# Patient Record
Sex: FEMALE | Race: WHITE | NOT HISPANIC OR LATINO | URBAN - METROPOLITAN AREA
[De-identification: names, ages, dates, MRNs, and addresses within clinical notes are randomized per-mention and may not be internally consistent; named-entity substitution may affect disease eponyms.]

---

## 2017-04-12 ENCOUNTER — OUTPATIENT (OUTPATIENT)
Dept: OUTPATIENT SERVICES | Facility: HOSPITAL | Age: 77
LOS: 1 days | End: 2017-04-12
Payer: MEDICARE

## 2017-04-12 VITALS
RESPIRATION RATE: 18 BRPM | SYSTOLIC BLOOD PRESSURE: 120 MMHG | OXYGEN SATURATION: 97 % | WEIGHT: 169.98 LBS | TEMPERATURE: 98 F | HEART RATE: 67 BPM | DIASTOLIC BLOOD PRESSURE: 58 MMHG

## 2017-04-12 DIAGNOSIS — Z90.710 ACQUIRED ABSENCE OF BOTH CERVIX AND UTERUS: Chronic | ICD-10-CM

## 2017-04-12 DIAGNOSIS — Z98.89 OTHER SPECIFIED POSTPROCEDURAL STATES: Chronic | ICD-10-CM

## 2017-04-12 DIAGNOSIS — H26.9 UNSPECIFIED CATARACT: Chronic | ICD-10-CM

## 2017-04-12 DIAGNOSIS — Z01.810 ENCOUNTER FOR PREPROCEDURAL CARDIOVASCULAR EXAMINATION: ICD-10-CM

## 2017-04-12 LAB
ANION GAP SERPL CALC-SCNC: 14 MMOL/L — SIGNIFICANT CHANGE UP (ref 5–17)
ANISOCYTOSIS BLD QL: SLIGHT — SIGNIFICANT CHANGE UP
APTT BLD: 28.6 SEC — SIGNIFICANT CHANGE UP (ref 27.5–37.4)
BASOPHILS # BLD AUTO: 0.1 K/UL — SIGNIFICANT CHANGE UP (ref 0–0.2)
BASOPHILS NFR BLD AUTO: 4 % — HIGH (ref 0–2)
BUN SERPL-MCNC: 23 MG/DL — HIGH (ref 8–20)
CALCIUM SERPL-MCNC: 9.9 MG/DL — SIGNIFICANT CHANGE UP (ref 8.6–10.2)
CHLORIDE SERPL-SCNC: 104 MMOL/L — SIGNIFICANT CHANGE UP (ref 98–107)
CO2 SERPL-SCNC: 22 MMOL/L — SIGNIFICANT CHANGE UP (ref 22–29)
CREAT SERPL-MCNC: 0.94 MG/DL — SIGNIFICANT CHANGE UP (ref 0.5–1.3)
EOSINOPHIL # BLD AUTO: 2.3 K/UL — HIGH (ref 0–0.5)
EOSINOPHIL NFR BLD AUTO: 21 % — HIGH (ref 0–5)
GLUCOSE SERPL-MCNC: 105 MG/DL — SIGNIFICANT CHANGE UP (ref 70–115)
HCT VFR BLD CALC: 35 % — LOW (ref 37–47)
HGB BLD-MCNC: 12 G/DL — SIGNIFICANT CHANGE UP (ref 12–16)
INR BLD: 1.04 RATIO — SIGNIFICANT CHANGE UP (ref 0.88–1.16)
LYMPHOCYTES # BLD AUTO: 19 % — LOW (ref 20–55)
LYMPHOCYTES # BLD AUTO: 2.4 K/UL — SIGNIFICANT CHANGE UP (ref 1–4.8)
MACROCYTES BLD QL: SLIGHT — SIGNIFICANT CHANGE UP
MCHC RBC-ENTMCNC: 30.5 PG — SIGNIFICANT CHANGE UP (ref 27–31)
MCHC RBC-ENTMCNC: 34.3 G/DL — SIGNIFICANT CHANGE UP (ref 32–36)
MCV RBC AUTO: 88.8 FL — SIGNIFICANT CHANGE UP (ref 81–99)
MONOCYTES # BLD AUTO: 0.5 K/UL — SIGNIFICANT CHANGE UP (ref 0–0.8)
MONOCYTES NFR BLD AUTO: 4 % — SIGNIFICANT CHANGE UP (ref 3–10)
NEUTROPHILS # BLD AUTO: 4.7 K/UL — SIGNIFICANT CHANGE UP (ref 1.8–8)
NEUTROPHILS NFR BLD AUTO: 52 % — SIGNIFICANT CHANGE UP (ref 37–73)
PLAT MORPH BLD: NORMAL — SIGNIFICANT CHANGE UP
PLATELET # BLD AUTO: 189 K/UL — SIGNIFICANT CHANGE UP (ref 150–400)
POLYCHROMASIA BLD QL SMEAR: SLIGHT — SIGNIFICANT CHANGE UP
POTASSIUM SERPL-MCNC: 4.6 MMOL/L — SIGNIFICANT CHANGE UP (ref 3.5–5.3)
POTASSIUM SERPL-SCNC: 4.6 MMOL/L — SIGNIFICANT CHANGE UP (ref 3.5–5.3)
PROTHROM AB SERPL-ACNC: 11.5 SEC — SIGNIFICANT CHANGE UP (ref 9.8–12.7)
RBC # BLD: 3.94 M/UL — LOW (ref 4.4–5.2)
RBC # FLD: 13.2 % — SIGNIFICANT CHANGE UP (ref 11–15.6)
RBC BLD AUTO: ABNORMAL
SODIUM SERPL-SCNC: 140 MMOL/L — SIGNIFICANT CHANGE UP (ref 135–145)
WBC # BLD: 10 K/UL — SIGNIFICANT CHANGE UP (ref 4.8–10.8)
WBC # FLD AUTO: 10 K/UL — SIGNIFICANT CHANGE UP (ref 4.8–10.8)

## 2017-04-12 PROCEDURE — 93010 ELECTROCARDIOGRAM REPORT: CPT

## 2017-04-12 PROCEDURE — 85027 COMPLETE CBC AUTOMATED: CPT

## 2017-04-12 PROCEDURE — 85730 THROMBOPLASTIN TIME PARTIAL: CPT

## 2017-04-12 PROCEDURE — G0463: CPT

## 2017-04-12 PROCEDURE — 93005 ELECTROCARDIOGRAM TRACING: CPT

## 2017-04-12 PROCEDURE — 80048 BASIC METABOLIC PNL TOTAL CA: CPT

## 2017-04-12 PROCEDURE — 85610 PROTHROMBIN TIME: CPT

## 2017-04-12 RX ORDER — LOSARTAN POTASSIUM 100 MG/1
1 TABLET, FILM COATED ORAL
Qty: 0 | Refills: 0 | COMMUNITY

## 2017-04-12 NOTE — ASU PATIENT PROFILE, ADULT - PSH
S/P appendectomy    S/P brain surgery    S/P hysterectomy    S/P tonsillectomy Cataract of both eyes    S/P appendectomy    S/P brain surgery    S/P hysterectomy    S/P tonsillectomy

## 2017-04-12 NOTE — H&P PST ADULT - FAMILY HISTORY
No significant family history     Grandparent  Still living? Unknown  Family history of stroke, Age at diagnosis: Age Unknown

## 2017-04-12 NOTE — ASU PATIENT PROFILE, ADULT - PMH
C. difficile colitis    Cerebral aneurysm    Diverticulitis    Hyperlipidemia    Hypertension    Hypertension    Hypothyroid    Hypothyroidism    Seasonal allergies    Stroke    Vertigo

## 2017-04-12 NOTE — H&P PST ADULT - HISTORY OF PRESENT ILLNESS
Patient is a 76 year old female who presents for PST prior to a [facility]. Patient with PMHx HLD, HTN, thyroid disorder, Burton aneurysm, CVA. She has complaints of increased SOB and CISNEROS. worse when climbing stairs and often associated with chest discomfort. Due to underlying risk for CAD, the plan was made to obtain a left heart cath.

## 2017-04-13 ENCOUNTER — OUTPATIENT (OUTPATIENT)
Dept: OUTPATIENT SERVICES | Facility: HOSPITAL | Age: 77
LOS: 1 days | Discharge: HOME | End: 2017-04-13
Payer: MEDICARE

## 2017-04-13 ENCOUNTER — TRANSCRIPTION ENCOUNTER (OUTPATIENT)
Age: 77
End: 2017-04-13

## 2017-04-13 VITALS
DIASTOLIC BLOOD PRESSURE: 68 MMHG | RESPIRATION RATE: 18 BRPM | OXYGEN SATURATION: 94 % | SYSTOLIC BLOOD PRESSURE: 144 MMHG | HEART RATE: 58 BPM

## 2017-04-13 DIAGNOSIS — I25.10 ATHEROSCLEROTIC HEART DISEASE OF NATIVE CORONARY ARTERY WITHOUT ANGINA PECTORIS: ICD-10-CM

## 2017-04-13 DIAGNOSIS — Z01.810 ENCOUNTER FOR PREPROCEDURAL CARDIOVASCULAR EXAMINATION: ICD-10-CM

## 2017-04-13 DIAGNOSIS — Z98.89 OTHER SPECIFIED POSTPROCEDURAL STATES: Chronic | ICD-10-CM

## 2017-04-13 DIAGNOSIS — Z90.710 ACQUIRED ABSENCE OF BOTH CERVIX AND UTERUS: Chronic | ICD-10-CM

## 2017-04-13 DIAGNOSIS — H26.9 UNSPECIFIED CATARACT: Chronic | ICD-10-CM

## 2017-04-13 DIAGNOSIS — R94.39 ABNORMAL RESULT OF OTHER CARDIOVASCULAR FUNCTION STUDY: ICD-10-CM

## 2017-04-13 PROCEDURE — 92928 PRQ TCAT PLMT NTRAC ST 1 LES: CPT | Mod: RC

## 2017-04-13 PROCEDURE — 93458 L HRT ARTERY/VENTRICLE ANGIO: CPT | Mod: 26,XU

## 2017-04-13 PROCEDURE — 93010 ELECTROCARDIOGRAM REPORT: CPT

## 2017-04-13 RX ORDER — ASPIRIN/CALCIUM CARB/MAGNESIUM 324 MG
81 TABLET ORAL ONCE
Qty: 0 | Refills: 0 | Status: COMPLETED | OUTPATIENT
Start: 2017-04-13 | End: 2017-04-13

## 2017-04-13 RX ORDER — METOPROLOL TARTRATE 50 MG
25 TABLET ORAL DAILY
Qty: 0 | Refills: 0 | Status: DISCONTINUED | OUTPATIENT
Start: 2017-04-14 | End: 2017-04-28

## 2017-04-13 RX ORDER — CLOPIDOGREL BISULFATE 75 MG/1
75 TABLET, FILM COATED ORAL DAILY
Qty: 0 | Refills: 0 | Status: DISCONTINUED | OUTPATIENT
Start: 2017-04-14 | End: 2017-04-28

## 2017-04-13 RX ORDER — LORATADINE 10 MG/1
10 TABLET ORAL DAILY
Qty: 0 | Refills: 0 | Status: DISCONTINUED | OUTPATIENT
Start: 2017-04-13 | End: 2017-04-28

## 2017-04-13 RX ORDER — ATORVASTATIN CALCIUM 80 MG/1
10 TABLET, FILM COATED ORAL AT BEDTIME
Qty: 0 | Refills: 0 | Status: DISCONTINUED | OUTPATIENT
Start: 2017-04-13 | End: 2017-04-28

## 2017-04-13 RX ORDER — MONTELUKAST 4 MG/1
10 TABLET, CHEWABLE ORAL DAILY
Qty: 0 | Refills: 0 | Status: DISCONTINUED | OUTPATIENT
Start: 2017-04-13 | End: 2017-04-28

## 2017-04-13 RX ORDER — METOPROLOL TARTRATE 50 MG
25 TABLET ORAL ONCE
Qty: 0 | Refills: 0 | Status: COMPLETED | OUTPATIENT
Start: 2017-04-13 | End: 2017-04-13

## 2017-04-13 RX ORDER — ASPIRIN/CALCIUM CARB/MAGNESIUM 324 MG
81 TABLET ORAL DAILY
Qty: 0 | Refills: 0 | Status: DISCONTINUED | OUTPATIENT
Start: 2017-04-13 | End: 2017-04-28

## 2017-04-13 RX ORDER — LOSARTAN POTASSIUM 100 MG/1
50 TABLET, FILM COATED ORAL DAILY
Qty: 0 | Refills: 0 | Status: DISCONTINUED | OUTPATIENT
Start: 2017-04-13 | End: 2017-04-28

## 2017-04-13 RX ORDER — LEVOTHYROXINE SODIUM 125 MCG
50 TABLET ORAL DAILY
Qty: 0 | Refills: 0 | Status: DISCONTINUED | OUTPATIENT
Start: 2017-04-13 | End: 2017-04-28

## 2017-04-13 RX ADMIN — Medication 81 MILLIGRAM(S): at 08:36

## 2017-04-13 RX ADMIN — Medication 100 MILLIGRAM(S): at 14:15

## 2017-04-13 RX ADMIN — Medication 100 MILLIGRAM(S): at 21:22

## 2017-04-13 RX ADMIN — Medication 25 MILLIGRAM(S): at 11:53

## 2017-04-13 RX ADMIN — Medication 100 MILLIGRAM(S): at 08:43

## 2017-04-13 RX ADMIN — LOSARTAN POTASSIUM 50 MILLIGRAM(S): 100 TABLET, FILM COATED ORAL at 18:12

## 2017-04-13 RX ADMIN — ATORVASTATIN CALCIUM 10 MILLIGRAM(S): 80 TABLET, FILM COATED ORAL at 21:22

## 2017-04-13 NOTE — DISCHARGE NOTE ADULT - CARE PROVIDERS DIRECT ADDRESSES
[email or web address]

## 2017-04-13 NOTE — DISCHARGE NOTE ADULT - PATIENT PORTAL LINK FT
“You can access the FollowHealth Patient Portal, offered by [facility], by registering with the following website: [email or web address]”

## 2017-04-13 NOTE — DISCHARGE NOTE ADULT - MEDICATION SUMMARY - MEDICATIONS TO TAKE
I will START or STAY ON the medications listed below when I get home from the hospital:    Aspirin Enteric Coated 81 mg oral delayed release tablet  -- 2 tab(s) by mouth once a day  -- Indication: For CAD (coronary artery disease)    losartan 50 mg oral tablet  -- 1 tab(s) by mouth once a day  -- Indication: For htn    levocetirizine 5 mg oral tablet  -- 1 tab(s) by mouth once a day (in the evening)  -- Indication: For allergies    atorvastatin 10 mg oral tablet  -- 1 tab(s) by mouth once a day (at bedtime)  -- Indication: For CAD (coronary artery disease)    clopidogrel 75 mg oral tablet  -- 1 tab(s) by mouth once a day  -- Indication: For CAD (coronary artery disease)    Bystolic 5 mg oral tablet  -- 1 tab(s) by mouth once a day  -- Indication: For htn    montelukast 10 mg oral tablet  -- 1 tab(s) by mouth once a day (in the evening)  -- Indication: For allergies    Lovaza ethyl esters 1000 mg oral capsule  -- 2 cap(s) by mouth once a day  -- Indication: For hyperlipidemia    Probiotic Formula (Bacillus Coagulans) oral capsule  -- 1 cap(s) by mouth once a day  -- Indication: For supplement    Synthroid 50 mcg (0.05 mg) oral tablet  -- 1 tab(s) by mouth once a day  -- Indication: For hypothyroid    Centrum Silver  -- 1 tab(s) by mouth once a day  -- Indication: For supplement    Calcium 600+D 600 mg-200 units oral tablet  -- 2 tab(s) by mouth once a day  -- Indication: For supplement    Vitamin B12  -- 1500 microgram(s) by mouth once a day  -- Indication: For supplement

## 2017-04-13 NOTE — DISCHARGE NOTE ADULT - ADDITIONAL INSTRUCTIONS
Restricted use with no heavy lifting of affected arm for 48 hours.  No submerging the arm in water for 48 hours.  You may start showering today.  Call your doctor for any bleeding, swelling, loss of sensation in the hand or fingers, or fingers turning blue.  If heavy bleeding or large lumps form, hold pressure at the spot and come to the Emergency Room.

## 2017-04-13 NOTE — DISCHARGE NOTE ADULT - HOSPITAL COURSE
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  Now s/p PCI with CARLOS X1 pRCA.  Add plavix. 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  Now s/p PCI with CARLOS X1 pRCA.              PROCEDURE RESULTS: S/P PCI with CARLOS X1 to pRCA    ASSESSMENT/PLAN: 	  -Resume home meds  -Initiate plavix (discussed with Dr. Garcia d/t h/o brain aneurysm and coil)  -Follow up with Dr. Salas  Discussed aggressive life style modifications

## 2017-04-13 NOTE — DISCHARGE NOTE ADULT - PLAN OF CARE
optimal cardiac function Restricted use with no heavy lifting of affected arm for 48 hours.  No submerging the arm in water for 48 hours.  You may start showering today.  Call your doctor for any bleeding, swelling, loss of sensation in the hand or fingers, or fingers turning blue.  If heavy bleeding or large lumps form, hold pressure at the spot and come to the Emergency Room.

## 2017-04-13 NOTE — PROGRESS NOTE ADULT - SUBJECTIVE AND OBJECTIVE BOX
Cardiology NP    76 year old female with c/o increased SOB with positive stress test. For cardiac cath to assess coronary arteries.     No change in medical history since PST.

## 2017-04-13 NOTE — PROGRESS NOTE ADULT - SUBJECTIVE AND OBJECTIVE BOX
Nurse Practitioner Progress note:     INTERVAL HISTORY: 76 year old female with c/o increasing SOB and positive stress test    MEDICATIONS:  losartan 50milliGRAM(s) Oral daily  metoprolol 25milliGRAM(s) Oral once  loratadine 10milliGRAM(s) Oral daily  montelukast 10milliGRAM(s) Oral daily  atorvastatin 10milliGRAM(s) Oral at bedtime  levothyroxine 50MICROGram(s) Oral daily  aspirin enteric coated 81milliGRAM(s) Oral daily      TELEMETRY: NSR    HR: 58 (58 - 58)  BP: 144/68 (144/68 - 144/68)  RR: 18 (18 - 18)  SpO2: 94% (94% - 94%)      PHYSICAL EXAM:  Appearance: Normal	  Cardiovascular: Normal S1 S2, No JVD, No murmurs, No edema  Respiratory: Lungs clear to auscultation	  Neurologic: Non-focal, A&O X3.  No neuro deficits  Procedure Site: Right radial band in place.  Site benign.  No bleeding/hematoma/ecchymosis. + palp radial pulse    12 lead EKG:  	SB 1st degree HB      PROCEDURE RESULTS: S/P PCI with CARLOS X1 to pRCA    ASSESSMENT/PLAN: 	  -Radial precautions  -D/C radial band in 1 hour  -Resume home meds  -Initiate plavix (discussed with Dr. Garcia d/t h/o brain aneurysm and coil)  -Follow up with Dr. Salas  -Check labs/EKG/site check in AM  -Probable discharge in AM

## 2017-04-13 NOTE — PATIENT PROFILE ADULT. - PSH
Cataract of both eyes    S/P appendectomy    S/P brain surgery    S/P hysterectomy    S/P tonsillectomy

## 2017-04-13 NOTE — DISCHARGE NOTE ADULT - CARE PROVIDER_API CALL
Marcus Jane), Cardiovascular Disease; Internal Medicine  [address] [place] [address]  Phone: [phone]  Fax: [phone]

## 2017-04-13 NOTE — DISCHARGE NOTE ADULT - CARE PLAN
Principal Discharge DX:	CAD (coronary artery disease)  Goal:	optimal cardiac function  Instructions for follow-up, activity and diet:	Restricted use with no heavy lifting of affected arm for 48 hours.  No submerging the arm in water for 48 hours.  You may start showering today.  Call your doctor for any bleeding, swelling, loss of sensation in the hand or fingers, or fingers turning blue.  If heavy bleeding or large lumps form, hold pressure at the spot and come to the Emergency Room.

## 2017-04-14 VITALS
TEMPERATURE: 98 F | RESPIRATION RATE: 16 BRPM | HEART RATE: 74 BPM | SYSTOLIC BLOOD PRESSURE: 134 MMHG | OXYGEN SATURATION: 96 % | DIASTOLIC BLOOD PRESSURE: 58 MMHG

## 2017-04-14 LAB
ANION GAP SERPL CALC-SCNC: 16 MMOL/L — SIGNIFICANT CHANGE UP (ref 5–17)
BUN SERPL-MCNC: 23 MG/DL — HIGH (ref 8–20)
CALCIUM SERPL-MCNC: 9.3 MG/DL — SIGNIFICANT CHANGE UP (ref 8.6–10.2)
CHLORIDE SERPL-SCNC: 103 MMOL/L — SIGNIFICANT CHANGE UP (ref 98–107)
CO2 SERPL-SCNC: 22 MMOL/L — SIGNIFICANT CHANGE UP (ref 22–29)
CREAT SERPL-MCNC: 1.03 MG/DL — SIGNIFICANT CHANGE UP (ref 0.5–1.3)
GLUCOSE SERPL-MCNC: 131 MG/DL — HIGH (ref 70–115)
HCT VFR BLD CALC: 33.9 % — LOW (ref 37–47)
HGB BLD-MCNC: 12.2 G/DL — SIGNIFICANT CHANGE UP (ref 12–16)
INR BLD: 1.07 RATIO — SIGNIFICANT CHANGE UP (ref 0.88–1.16)
MCHC RBC-ENTMCNC: 31.9 PG — HIGH (ref 27–31)
MCHC RBC-ENTMCNC: 36 G/DL — SIGNIFICANT CHANGE UP (ref 32–36)
MCV RBC AUTO: 88.7 FL — SIGNIFICANT CHANGE UP (ref 81–99)
PLATELET # BLD AUTO: 181 K/UL — SIGNIFICANT CHANGE UP (ref 150–400)
POTASSIUM SERPL-MCNC: 4.1 MMOL/L — SIGNIFICANT CHANGE UP (ref 3.5–5.3)
POTASSIUM SERPL-SCNC: 4.1 MMOL/L — SIGNIFICANT CHANGE UP (ref 3.5–5.3)
PROTHROM AB SERPL-ACNC: 11.8 SEC — SIGNIFICANT CHANGE UP (ref 9.8–12.7)
RBC # BLD: 3.82 M/UL — LOW (ref 4.4–5.2)
RBC # FLD: 13 % — SIGNIFICANT CHANGE UP (ref 11–15.6)
SODIUM SERPL-SCNC: 141 MMOL/L — SIGNIFICANT CHANGE UP (ref 135–145)
WBC # BLD: 10.4 K/UL — SIGNIFICANT CHANGE UP (ref 4.8–10.8)
WBC # FLD AUTO: 10.4 K/UL — SIGNIFICANT CHANGE UP (ref 4.8–10.8)

## 2017-04-14 PROCEDURE — 85027 COMPLETE CBC AUTOMATED: CPT

## 2017-04-14 PROCEDURE — 85610 PROTHROMBIN TIME: CPT

## 2017-04-14 PROCEDURE — C9600: CPT | Mod: RC

## 2017-04-14 PROCEDURE — C1894: CPT

## 2017-04-14 PROCEDURE — 93005 ELECTROCARDIOGRAM TRACING: CPT

## 2017-04-14 PROCEDURE — C1769: CPT

## 2017-04-14 PROCEDURE — 93010 ELECTROCARDIOGRAM REPORT: CPT

## 2017-04-14 PROCEDURE — C1887: CPT

## 2017-04-14 PROCEDURE — C1725: CPT

## 2017-04-14 PROCEDURE — 93458 L HRT ARTERY/VENTRICLE ANGIO: CPT

## 2017-04-14 PROCEDURE — C1874: CPT

## 2017-04-14 PROCEDURE — 80048 BASIC METABOLIC PNL TOTAL CA: CPT

## 2017-04-14 RX ORDER — CLOPIDOGREL BISULFATE 75 MG/1
1 TABLET, FILM COATED ORAL
Qty: 90 | Refills: 0
Start: 2017-04-14

## 2017-04-14 RX ORDER — CLOPIDOGREL BISULFATE 75 MG/1
1 TABLET, FILM COATED ORAL
Qty: 90 | Refills: 0 | OUTPATIENT
Start: 2017-04-14

## 2017-04-14 RX ADMIN — Medication 100 MILLIGRAM(S): at 06:26

## 2017-04-14 RX ADMIN — MONTELUKAST 10 MILLIGRAM(S): 4 TABLET, CHEWABLE ORAL at 11:10

## 2017-04-14 RX ADMIN — LOSARTAN POTASSIUM 50 MILLIGRAM(S): 100 TABLET, FILM COATED ORAL at 06:25

## 2017-04-14 RX ADMIN — Medication 25 MILLIGRAM(S): at 06:25

## 2017-04-14 RX ADMIN — Medication 50 MICROGRAM(S): at 06:25

## 2017-04-14 RX ADMIN — CLOPIDOGREL BISULFATE 75 MILLIGRAM(S): 75 TABLET, FILM COATED ORAL at 11:10

## 2017-04-14 RX ADMIN — Medication 81 MILLIGRAM(S): at 11:10

## 2017-04-14 RX ADMIN — LORATADINE 10 MILLIGRAM(S): 10 TABLET ORAL at 11:10

## 2017-04-14 NOTE — PROGRESS NOTE ADULT - SUBJECTIVE AND OBJECTIVE BOX
[facility]  [facility] and Fatigue    History of Present Illness:  		  Patient is a 76 year old female who presents for PST prior to a LHC. Patient with PMHx HLD, HTN, thyroid disorder, Burton aneurysm, CVA. She has complaints of increased SOB and CISNEROS. worse when climbing stairs and often associated with chest discomfort. Due to underlying risk for CAD.  Underwent LHC yesterday, official report pending.         Pre-operative cardiovascular examination  Other nonspecific abnormal cardiovascular system function study  No h/o HF  Family history of stroke (Grandparent)  No significant family history  Handoff  C. difficile colitis  Vertigo  Hypothyroidism  Hyperlipidemia  Hypertension  Cerebral aneurysm  Diverticulitis  Hypertension  Stroke  Seasonal allergies  Hypothyroid  CAD (coronary artery disease)  Cataract of both eyes  S/P brain surgery  S/P hysterectomy  S/P appendectomy  S/P tonsillectomy      REVIEW OF SYSTEMS    General: Denies fever, chills, pain, no discomfort  	  Respiratory and Thorax:  Denies cough, sob, or any discomfort  	  Cardiovascular:  Denies chest pain, palpitations or any discomfort	    Gastrointestinal:  Denies n/v/d, constipation, or any discomfort    Genitourinary:  Denies frequency, burning, or pain    Musculoskeletal:  Denies joint pain, swelling, or any discomfort     Neurological:  Denies headache, dizziness blurred vision, numbing or tingling    Hematology  Kameron bleeding o swelling    Allergic/Immunologic:	  MEDICATIONS:  losartan 50milliGRAM(s) Oral daily  metoprolol 25milliGRAM(s) Oral daily  loratadine 10milliGRAM(s) Oral daily  montelukast 10milliGRAM(s) Oral daily  guaiFENesin    Syrup 100milliGRAM(s) Oral every 6 hours PRN  atorvastatin 10milliGRAM(s) Oral at bedtime  levothyroxine 50MICROGram(s) Oral daily  clopidogrel Tablet 75milliGRAM(s) Oral daily  aspirin enteric coated 81milliGRAM(s) Oral daily        PHYSICAL EXAM:    T(C): 36.9, Max: 37.1 (04-13 @ 18:10)  HR: 63 (52 - 64)  BP: 130/50 (111/56 - 161/73)  RR: 16 (14 - 16)  SpO2: 96% (94% - 97%)    Daily Weight in k.3 (2017 05:24)    Appearance: Normal	  HEENT:   Normal oral mucosa, PERRL, EOMI	  Lymphatic: No lymphadenopathy  Cardiovascular: Normal S1 S2, No JVD, No murmurs, No edema  Respiratory: Lungs clear to auscultation	  Psychiatry: A & O x 3, Mood & affect appropriate  Gastrointestinal:  Soft, Non-tender, + BS	  Skin: No rashes, No ecchymoses, No cyanosis  Neurologic: Non-focal  Extremities: Normal range of motion, No clubbing, cyanosis or edema  Vascular: Peripheral pulses palpable 2+ bilaterally  Procedure Site:  Right radial site, no bleeding, no pain, slight bruising noted, no hematoma, +PP no edema.      TELEMETRY: 	 Sr 1 degree AV block, no ecotpy noted.     ECG:  	SR, 1 degree, R BBB noted             12.2   10.4  )-----------( 181      ( 2017 05:26 )             33.9     04-14    141  |  103  |  23.0<H>  ----------------------------<  131<H>  4.1   |  22.0  |  1.03    Ca    9.3      2017 05:26        PROCEDURE RESULTS: S/P PCI with CARLOS X1 to pRCA    ASSESSMENT/PLAN: 	  -Resume home meds  -Initiate plavix (discussed with Dr. Garcia d/t h/o brain aneurysm and coil)  -Follow up with Dr. Salas  Discussed aggressive life style modifications

## 2017-12-22 ENCOUNTER — RX RENEWAL (OUTPATIENT)
Age: 77
End: 2017-12-22

## 2018-01-08 ENCOUNTER — RECORD ABSTRACTING (OUTPATIENT)
Age: 78
End: 2018-01-08

## 2018-01-08 DIAGNOSIS — Z86.73 PERSONAL HISTORY OF TRANSIENT ISCHEMIC ATTACK (TIA), AND CEREBRAL INFARCTION W/OUT RESIDUAL DEFICITS: ICD-10-CM

## 2018-01-09 ENCOUNTER — RECORD ABSTRACTING (OUTPATIENT)
Age: 78
End: 2018-01-09

## 2018-02-05 ENCOUNTER — APPOINTMENT (OUTPATIENT)
Dept: CARDIOLOGY | Facility: CLINIC | Age: 78
End: 2018-02-05
Payer: MEDICARE

## 2018-02-05 VITALS
DIASTOLIC BLOOD PRESSURE: 60 MMHG | HEIGHT: 66 IN | HEART RATE: 64 BPM | SYSTOLIC BLOOD PRESSURE: 139 MMHG | BODY MASS INDEX: 27.64 KG/M2 | WEIGHT: 172 LBS | RESPIRATION RATE: 14 BRPM

## 2018-02-05 PROCEDURE — 93000 ELECTROCARDIOGRAM COMPLETE: CPT

## 2018-02-05 PROCEDURE — 99214 OFFICE O/P EST MOD 30 MIN: CPT

## 2018-02-05 RX ORDER — ATORVASTATIN CALCIUM 10 MG/1
10 TABLET, FILM COATED ORAL
Refills: 0 | Status: DISCONTINUED | COMMUNITY
End: 2018-02-05

## 2018-06-01 ENCOUNTER — APPOINTMENT (OUTPATIENT)
Dept: CARDIOLOGY | Facility: CLINIC | Age: 78
End: 2018-06-01
Payer: MEDICARE

## 2018-06-01 VITALS
BODY MASS INDEX: 27.97 KG/M2 | WEIGHT: 174 LBS | SYSTOLIC BLOOD PRESSURE: 110 MMHG | DIASTOLIC BLOOD PRESSURE: 58 MMHG | RESPIRATION RATE: 14 BRPM | HEIGHT: 66 IN | HEART RATE: 72 BPM

## 2018-06-01 DIAGNOSIS — E07.9 DISORDER OF THYROID, UNSPECIFIED: ICD-10-CM

## 2018-06-01 PROCEDURE — 93000 ELECTROCARDIOGRAM COMPLETE: CPT

## 2018-06-01 PROCEDURE — 99214 OFFICE O/P EST MOD 30 MIN: CPT

## 2018-09-11 ENCOUNTER — APPOINTMENT (OUTPATIENT)
Dept: CARDIOLOGY | Facility: CLINIC | Age: 78
End: 2018-09-11

## 2018-10-05 ENCOUNTER — MOBILE ON CALL (OUTPATIENT)
Age: 78
End: 2018-10-05

## 2018-11-07 ENCOUNTER — APPOINTMENT (OUTPATIENT)
Dept: GASTROENTEROLOGY | Facility: CLINIC | Age: 78
End: 2018-11-07
Payer: MEDICARE

## 2018-11-07 VITALS
BODY MASS INDEX: 28.12 KG/M2 | SYSTOLIC BLOOD PRESSURE: 169 MMHG | DIASTOLIC BLOOD PRESSURE: 81 MMHG | WEIGHT: 175 LBS | HEIGHT: 66 IN | HEART RATE: 68 BPM

## 2018-11-07 DIAGNOSIS — R93.89 ABNORMAL FINDINGS ON DIAGNOSTIC IMAGING OF OTHER SPECIFIED BODY STRUCTURES: ICD-10-CM

## 2018-11-07 PROCEDURE — 99204 OFFICE O/P NEW MOD 45 MIN: CPT

## 2018-11-26 ENCOUNTER — APPOINTMENT (OUTPATIENT)
Dept: CARDIOLOGY | Facility: CLINIC | Age: 78
End: 2018-11-26
Payer: MEDICARE

## 2018-11-26 VITALS
SYSTOLIC BLOOD PRESSURE: 138 MMHG | HEIGHT: 66 IN | HEART RATE: 70 BPM | BODY MASS INDEX: 28.28 KG/M2 | RESPIRATION RATE: 14 BRPM | WEIGHT: 176 LBS | DIASTOLIC BLOOD PRESSURE: 60 MMHG

## 2018-11-26 DIAGNOSIS — R53.82 CHRONIC FATIGUE, UNSPECIFIED: ICD-10-CM

## 2018-11-26 PROCEDURE — 99214 OFFICE O/P EST MOD 30 MIN: CPT

## 2018-11-26 PROCEDURE — 93000 ELECTROCARDIOGRAM COMPLETE: CPT

## 2018-11-26 RX ORDER — LEVOTHYROXINE SODIUM 75 UG/1
75 TABLET ORAL
Refills: 0 | Status: DISCONTINUED | COMMUNITY
End: 2018-11-26

## 2018-11-26 NOTE — REVIEW OF SYSTEMS
[Feeling Fatigued] : feeling fatigued [see HPI] : see HPI [Depression] : depression [Negative] : Heme/Lymph

## 2018-11-27 NOTE — PHYSICAL EXAM
[General Appearance - Well Developed] : well developed [General Appearance - Well Nourished] : well nourished [General Appearance - In No Acute Distress] : no acute distress [Normal Conjunctiva] : the conjunctiva exhibited no abnormalities [Normal Oral Mucosa] : normal oral mucosa [Auscultation Breath Sounds / Voice Sounds] : lungs were clear to auscultation bilaterally [Heart Rate And Rhythm] : heart rate and rhythm were normal [Heart Sounds] : normal S1 and S2 [Murmurs] : no murmurs present [Systolic grade ___/6] : A grade [unfilled]/6 systolic murmur was heard. [Bowel Sounds] : normal bowel sounds [Abnormal Walk] : normal gait [Skin Color & Pigmentation] : normal skin color and pigmentation [Skin Turgor] : normal skin turgor [Oriented To Time, Place, And Person] : oriented to person, place, and time [Affect] : the affect was normal [Mood] : the mood was normal [FreeTextEntry1] : No edema

## 2018-11-27 NOTE — HISTORY OF PRESENT ILLNESS
[FreeTextEntry1] : From a cardiac standpoint, Mrs. Walker denies exertional chest pain, shortness of breath, palpitations, lightheadedness, or syncope.  She continues to complain of fatigue which may well be secondary to chronic insomnia.  She states she is need of undergoing endoscopy because of recently documented pancreatic cysts on an abdominal MRI.

## 2018-11-27 NOTE — REASON FOR VISIT
[FreeTextEntry1] : Patient presents today for the evaluation and management of hypertension, hyperlipidemia, and coronary artery disease for which she is status-post RCA stenting in April 2017. \par \par  \par   \par

## 2018-11-27 NOTE — ASSESSMENT
[FreeTextEntry1] : 1.  EKG today reveals sinus rhythm at 70 bpm.  Incomplete right bundle branch block.  No acute ischemic changes.  \par 2.  Hypertension:  Blood pressure well controlled at this time on current medications. \par 3.  Hyperlipidemia:  Patient advised on a low-fat / low-cholesterol diet.  Continuation of atorvastatin therapy.  Repeat bloodwork in 3-6 months. \par 4.  Coronary artery disease status-post RCA stenting:  Patient remains asymptomatic and reasonable active.  \par 5.  Pancreatic cysts:  Patient in need of upper endoscopy and possibly endoscopic ultrasound with possible fine needle aspiration of her cysts.  From a cardiac standpoint, there are no significant contraindications.  Patient advised to hold aspirin, Plavix, omega III fish oil, and multivitamins for seven days prior to her procedure.   \par

## 2018-12-20 ENCOUNTER — TRANSCRIPTION ENCOUNTER (OUTPATIENT)
Age: 78
End: 2018-12-20

## 2018-12-21 ENCOUNTER — RESULT REVIEW (OUTPATIENT)
Age: 78
End: 2018-12-21

## 2018-12-21 ENCOUNTER — EMERGENCY (EMERGENCY)
Facility: HOSPITAL | Age: 78
LOS: 1 days | Discharge: HOME | End: 2018-12-21
Attending: EMERGENCY MEDICINE
Payer: MEDICARE

## 2018-12-21 ENCOUNTER — APPOINTMENT (OUTPATIENT)
Dept: GASTROENTEROLOGY | Facility: HOSPITAL | Age: 78
End: 2018-12-21

## 2018-12-21 ENCOUNTER — OUTPATIENT (OUTPATIENT)
Dept: OUTPATIENT SERVICES | Facility: HOSPITAL | Age: 78
LOS: 1 days | End: 2018-12-21
Payer: MEDICARE

## 2018-12-21 VITALS
OXYGEN SATURATION: 98 % | SYSTOLIC BLOOD PRESSURE: 160 MMHG | RESPIRATION RATE: 20 BRPM | HEART RATE: 67 BPM | DIASTOLIC BLOOD PRESSURE: 70 MMHG

## 2018-12-21 VITALS — HEIGHT: 67 IN | WEIGHT: 175.05 LBS

## 2018-12-21 DIAGNOSIS — Z98.89 OTHER SPECIFIED POSTPROCEDURAL STATES: Chronic | ICD-10-CM

## 2018-12-21 DIAGNOSIS — K86.2 CYST OF PANCREAS: ICD-10-CM

## 2018-12-21 DIAGNOSIS — Z90.710 ACQUIRED ABSENCE OF BOTH CERVIX AND UTERUS: Chronic | ICD-10-CM

## 2018-12-21 DIAGNOSIS — H26.9 UNSPECIFIED CATARACT: Chronic | ICD-10-CM

## 2018-12-21 LAB
ALBUMIN SERPL ELPH-MCNC: 4.2 G/DL — SIGNIFICANT CHANGE UP (ref 3.3–5.2)
ALP SERPL-CCNC: 67 U/L — SIGNIFICANT CHANGE UP (ref 40–120)
ALT FLD-CCNC: 22 U/L — SIGNIFICANT CHANGE UP
ANION GAP SERPL CALC-SCNC: 14 MMOL/L — SIGNIFICANT CHANGE UP (ref 5–17)
AST SERPL-CCNC: 19 U/L — SIGNIFICANT CHANGE UP
BASOPHILS # BLD AUTO: 0 K/UL — SIGNIFICANT CHANGE UP (ref 0–0.2)
BASOPHILS NFR BLD AUTO: 0.3 % — SIGNIFICANT CHANGE UP (ref 0–2)
BILIRUB SERPL-MCNC: 0.4 MG/DL — SIGNIFICANT CHANGE UP (ref 0.4–2)
BUN SERPL-MCNC: 23 MG/DL — HIGH (ref 8–20)
CALCIUM SERPL-MCNC: 8.4 MG/DL — LOW (ref 8.6–10.2)
CHLORIDE SERPL-SCNC: 104 MMOL/L — SIGNIFICANT CHANGE UP (ref 98–107)
CO2 SERPL-SCNC: 21 MMOL/L — LOW (ref 22–29)
CREAT SERPL-MCNC: 0.95 MG/DL — SIGNIFICANT CHANGE UP (ref 0.5–1.3)
EOSINOPHIL # BLD AUTO: 0 K/UL — SIGNIFICANT CHANGE UP (ref 0–0.5)
EOSINOPHIL NFR BLD AUTO: 0.5 % — SIGNIFICANT CHANGE UP (ref 0–6)
GLUCOSE SERPL-MCNC: 194 MG/DL — HIGH (ref 70–115)
HCT VFR BLD CALC: 34.5 % — LOW (ref 37–47)
HGB BLD-MCNC: 11.4 G/DL — LOW (ref 12–16)
LYMPHOCYTES # BLD AUTO: 1 K/UL — SIGNIFICANT CHANGE UP (ref 1–4.8)
LYMPHOCYTES # BLD AUTO: 15.6 % — LOW (ref 20–55)
MCHC RBC-ENTMCNC: 29.9 PG — SIGNIFICANT CHANGE UP (ref 27–31)
MCHC RBC-ENTMCNC: 33 G/DL — SIGNIFICANT CHANGE UP (ref 32–36)
MCV RBC AUTO: 90.6 FL — SIGNIFICANT CHANGE UP (ref 81–99)
MONOCYTES # BLD AUTO: 0.1 K/UL — SIGNIFICANT CHANGE UP (ref 0–0.8)
MONOCYTES NFR BLD AUTO: 1.5 % — LOW (ref 3–10)
NEUTROPHILS # BLD AUTO: 5.4 K/UL — SIGNIFICANT CHANGE UP (ref 1.8–8)
NEUTROPHILS NFR BLD AUTO: 81.6 % — HIGH (ref 37–73)
PLATELET # BLD AUTO: 168 K/UL — SIGNIFICANT CHANGE UP (ref 150–400)
POTASSIUM SERPL-MCNC: 4.6 MMOL/L — SIGNIFICANT CHANGE UP (ref 3.5–5.3)
POTASSIUM SERPL-SCNC: 4.6 MMOL/L — SIGNIFICANT CHANGE UP (ref 3.5–5.3)
PROT SERPL-MCNC: 6.4 G/DL — LOW (ref 6.6–8.7)
RBC # BLD: 3.81 M/UL — LOW (ref 4.4–5.2)
RBC # FLD: 12.8 % — SIGNIFICANT CHANGE UP (ref 11–15.6)
SODIUM SERPL-SCNC: 139 MMOL/L — SIGNIFICANT CHANGE UP (ref 135–145)
WBC # BLD: 6.6 K/UL — SIGNIFICANT CHANGE UP (ref 4.8–10.8)
WBC # FLD AUTO: 6.6 K/UL — SIGNIFICANT CHANGE UP (ref 4.8–10.8)

## 2018-12-21 PROCEDURE — 88173 CYTOPATH EVAL FNA REPORT: CPT | Mod: 26

## 2018-12-21 PROCEDURE — 85027 COMPLETE CBC AUTOMATED: CPT

## 2018-12-21 PROCEDURE — 96374 THER/PROPH/DIAG INJ IV PUSH: CPT

## 2018-12-21 PROCEDURE — 88305 TISSUE EXAM BY PATHOLOGIST: CPT | Mod: 26

## 2018-12-21 PROCEDURE — 80053 COMPREHEN METABOLIC PANEL: CPT

## 2018-12-21 PROCEDURE — 43242 EGD US FINE NEEDLE BX/ASPIR: CPT

## 2018-12-21 PROCEDURE — 82962 GLUCOSE BLOOD TEST: CPT

## 2018-12-21 PROCEDURE — 74019 RADEX ABDOMEN 2 VIEWS: CPT

## 2018-12-21 PROCEDURE — 88173 CYTOPATH EVAL FNA REPORT: CPT

## 2018-12-21 PROCEDURE — 36415 COLL VENOUS BLD VENIPUNCTURE: CPT

## 2018-12-21 PROCEDURE — 88305 TISSUE EXAM BY PATHOLOGIST: CPT

## 2018-12-21 PROCEDURE — 71046 X-RAY EXAM CHEST 2 VIEWS: CPT

## 2018-12-21 PROCEDURE — 99284 EMERGENCY DEPT VISIT MOD MDM: CPT | Mod: 25

## 2018-12-21 PROCEDURE — 43238 EGD US FINE NEEDLE BX/ASPIR: CPT

## 2018-12-21 PROCEDURE — 88342 IMHCHEM/IMCYTCHM 1ST ANTB: CPT | Mod: 26

## 2018-12-21 PROCEDURE — 71046 X-RAY EXAM CHEST 2 VIEWS: CPT | Mod: 26

## 2018-12-21 PROCEDURE — 43239 EGD BIOPSY SINGLE/MULTIPLE: CPT | Mod: 59

## 2018-12-21 PROCEDURE — 99284 EMERGENCY DEPT VISIT MOD MDM: CPT

## 2018-12-21 PROCEDURE — 43239 EGD BIOPSY SINGLE/MULTIPLE: CPT | Mod: XU

## 2018-12-21 PROCEDURE — 74019 RADEX ABDOMEN 2 VIEWS: CPT | Mod: 26

## 2018-12-21 PROCEDURE — 88342 IMHCHEM/IMCYTCHM 1ST ANTB: CPT

## 2018-12-21 RX ORDER — ONDANSETRON 8 MG/1
4 TABLET, FILM COATED ORAL ONCE
Qty: 0 | Refills: 0 | Status: COMPLETED | OUTPATIENT
Start: 2018-12-21 | End: 2018-12-21

## 2018-12-21 RX ADMIN — ONDANSETRON 4 MILLIGRAM(S): 8 TABLET, FILM COATED ORAL at 12:40

## 2018-12-21 NOTE — ED ADULT NURSE NOTE - OBJECTIVE STATEMENT
from our endoscopy suite as out patient, Had pancreatic cyst removal via endoscopy, became nausea and HTN. They have IV in, no labs.

## 2018-12-21 NOTE — ED ADULT NURSE NOTE - CHIEF COMPLAINT QUOTE
Patient arrived to ED today from endoscopy with c/o nausea, hypertension.  Patient was having procedure for fluid removal form pancreatic cyst, had procedure, was then having nausea and was hypertensive.  Patient has had symptoms for about 3 hours.

## 2018-12-21 NOTE — ED ADULT TRIAGE NOTE - CHIEF COMPLAINT QUOTE
Patient arrived to ED today from endoscopy with c/o nausea, hypertension.  Patient was having procedure for fluid removal form pancreatic cyst. Patient arrived to ED today from endoscopy with c/o nausea, hypertension.  Patient was having procedure for fluid removal form pancreatic cyst, had procedure, was then having nausea and was hypertensive.  Patient has had symptoms for about 3 hours.

## 2018-12-21 NOTE — ED PROVIDER NOTE - OBJECTIVE STATEMENT
78 year old female with a h/o pancreatic cyst presents with c/o nausea and vomiting after getting an endoscopy

## 2018-12-27 LAB — NON-GYNECOLOGICAL CYTOLOGY STUDY: SIGNIFICANT CHANGE UP

## 2018-12-28 LAB — SURGICAL PATHOLOGY STUDY: SIGNIFICANT CHANGE UP

## 2019-01-02 ENCOUNTER — OTHER (OUTPATIENT)
Age: 79
End: 2019-01-02

## 2019-01-10 ENCOUNTER — APPOINTMENT (OUTPATIENT)
Dept: CARDIOLOGY | Facility: CLINIC | Age: 79
End: 2019-01-10
Payer: MEDICARE

## 2019-01-10 VITALS
DIASTOLIC BLOOD PRESSURE: 60 MMHG | WEIGHT: 171 LBS | BODY MASS INDEX: 27.48 KG/M2 | HEART RATE: 67 BPM | HEIGHT: 66 IN | RESPIRATION RATE: 14 BRPM | SYSTOLIC BLOOD PRESSURE: 136 MMHG

## 2019-01-10 DIAGNOSIS — K86.2 CYST OF PANCREAS: ICD-10-CM

## 2019-01-10 DIAGNOSIS — R93.89 ABNORMAL FINDINGS ON DIAGNOSTIC IMAGING OF OTHER SPECIFIED BODY STRUCTURES: ICD-10-CM

## 2019-01-10 PROCEDURE — 99214 OFFICE O/P EST MOD 30 MIN: CPT

## 2019-01-10 PROCEDURE — 93000 ELECTROCARDIOGRAM COMPLETE: CPT

## 2019-01-10 RX ORDER — LEVOFLOXACIN 500 MG/1
500 TABLET, FILM COATED ORAL DAILY
Qty: 3 | Refills: 0 | Status: DISCONTINUED | COMMUNITY
Start: 2018-12-21 | End: 2019-01-10

## 2019-01-10 NOTE — PHYSICAL EXAM
[General Appearance - Well Developed] : well developed [General Appearance - In No Acute Distress] : no acute distress [Normal Conjunctiva] : the conjunctiva exhibited no abnormalities [Normal Oral Mucosa] : normal oral mucosa [] : no respiratory distress [Auscultation Breath Sounds / Voice Sounds] : lungs were clear to auscultation bilaterally [Heart Rate And Rhythm] : heart rate and rhythm were normal [Heart Sounds] : normal S1 and S2 [Edema] : no peripheral edema present [Bowel Sounds] : normal bowel sounds [Abnormal Walk] : normal gait [Skin Color & Pigmentation] : normal skin color and pigmentation [Skin Turgor] : normal skin turgor [Oriented To Time, Place, And Person] : oriented to person, place, and time [Affect] : the affect was normal [Mood] : the mood was normal [FreeTextEntry1] : Without edema

## 2019-01-10 NOTE — HISTORY OF PRESENT ILLNESS
[FreeTextEntry1] : Mrs. Walker presents today for follow up status post a recent [facility] admission for a pancreatic transgastric FNA which revealed no infection.  During her hospital stay, she had a CXR which revealed cardiomegaly.  Patient presently without complaints of chest pain, shortness of breath, palpitations, lightheadedness or syncope.

## 2019-01-10 NOTE — DISCUSSION/SUMMARY
[FreeTextEntry1] : Discussed case and plan with Dr. Jane\par \par 1 - Pancreatic cysts:  status post FNA biopsy.  Negative for infection.  During hospital stay, CXR revealed cardiomegaly.  Will schedule patient for echocardiogram.\par \par 2 - Hypertension:  blood pressure well controlled on current medications.  WIll continue to follow a strict low salt diet.  Called patient's [facility] pharmacy, her losartan not affected by the recall.\par \par 3 - CAD s/p RCA stent:  stable at this time.  NO chest pain, shortness of breath, palpitations, lightheadedness or syncope.\par \par 4 - Recent labs:  H/H 11.4/34.5, platelets 168, potassium 4.6, BUN 23, creatinine 0.95, glucose 194, cholesterol 165, triglycerides 159, HDL 39, LDL 94, TC/HDL 4.2, HgA1c 6.5, TSH 4.88.  Copy of labs given to patient for PMDs review of HgA1c and elevated TSH.\par \par 5 - Follow up after echocardiogram is completed.

## 2019-01-10 NOTE — REASON FOR VISIT
[FreeTextEntry1] : The patient is a 78 y.o. white female who presents today for evaluation and management of hypertension, hyperlipidemia, and coronary artery disease status post RCA stenting in April of 2017.

## 2019-02-06 ENCOUNTER — APPOINTMENT (OUTPATIENT)
Dept: CARDIOLOGY | Facility: CLINIC | Age: 79
End: 2019-02-06
Payer: MEDICARE

## 2019-02-06 PROCEDURE — 93308 TTE F-UP OR LMTD: CPT

## 2019-02-26 ENCOUNTER — APPOINTMENT (OUTPATIENT)
Dept: CARDIOLOGY | Facility: CLINIC | Age: 79
End: 2019-02-26

## 2019-02-28 ENCOUNTER — APPOINTMENT (OUTPATIENT)
Dept: CARDIOLOGY | Facility: CLINIC | Age: 79
End: 2019-02-28
Payer: MEDICARE

## 2019-02-28 ENCOUNTER — NON-APPOINTMENT (OUTPATIENT)
Age: 79
End: 2019-02-28

## 2019-02-28 VITALS
SYSTOLIC BLOOD PRESSURE: 120 MMHG | WEIGHT: 168 LBS | DIASTOLIC BLOOD PRESSURE: 60 MMHG | RESPIRATION RATE: 14 BRPM | BODY MASS INDEX: 27 KG/M2 | HEART RATE: 63 BPM | HEIGHT: 66 IN

## 2019-02-28 PROCEDURE — 99213 OFFICE O/P EST LOW 20 MIN: CPT

## 2019-02-28 PROCEDURE — 93000 ELECTROCARDIOGRAM COMPLETE: CPT

## 2019-02-28 RX ORDER — ASPIRIN 81 MG
81 TABLET, DELAYED RELEASE (ENTERIC COATED) ORAL
Refills: 0 | Status: ACTIVE | COMMUNITY

## 2019-02-28 RX ORDER — BUPROPION HYDROCHLORIDE 75 MG/1
75 TABLET, FILM COATED ORAL DAILY
Refills: 0 | Status: DISCONTINUED | COMMUNITY
End: 2019-02-28

## 2019-03-01 NOTE — ASSESSMENT
[FreeTextEntry1] : 1.  EKG today reveals normal sinus rhythm at 62 bpm.  1o AV block.  Incomplete right bundle branch block.  No acute ischemic changes. \par 2.  Echocardiography:  Patient recently underwent echocardiography which revealed normal left ventricular chamber dimensions and wall motion with preserved ejection reaction estimated between 70 and 75%.      There are no significant valvular abnormalities, intracardiac masses, or pericardial effusions noted.  Patient is advised on regular aerobic exercise as well as a low-fat diet.  Fasting bloodwork and an office visit in six months is advised. \par

## 2019-03-01 NOTE — HISTORY OF PRESENT ILLNESS
[FreeTextEntry1] : Patient presents today without complaints of exertional chest pain, shortness of breath, palpitations, lightheadedness, or syncope.  She states she remains reasonably active and has lost some weight.

## 2019-03-01 NOTE — REASON FOR VISIT
[FreeTextEntry1] : Mrs. Walker presents today for the evaluation and management of hypertension, hyperlipidemia, and coronary artery disease.  She is status-post RCA stenting in April of 2017.   \par   \par

## 2019-06-10 NOTE — H&P PST ADULT - LYMPH NODES
[facility]  [address] [place]  [address]  [phone]        June 11, 2019    Ruthy Cameron  [address]   [place] [address]        Dear Ruthy,    Enclosed are your results.  Your labs are normal/stable at this time.     Please call  with any questions.  We can also discuss any questions regarding these labs at your next scheduled visit.     Results for orders placed or performed in visit on 06/10/19   Hepatic panel (Albumin, ALT, AST, Bili, Alk Phos, TP)   Result Value Ref Range    Bilirubin Direct 0.2 0.0 - 0.2 mg/dL    Bilirubin Total 0.6 0.2 - 1.3 mg/dL    Albumin 4.3 3.4 - 5.0 g/dL    Protein Total 8.0 6.8 - 8.8 g/dL    Alkaline Phosphatase 65 40 - 150 U/L    ALT 23 0 - 50 U/L    AST 18 0 - 45 U/L   CBC with platelets   Result Value Ref Range    WBC DUP 4.0 - 11.0 10e9/L    RBC Count DUP 3.8 - 5.2 10e12/L    Hemoglobin DUP 11.7 - 15.7 g/dL    Hematocrit DUP 35.0 - 47.0 %    MCV DUP 78 - 100 fl    MCH DUP 26.5 - 33.0 pg    MCHC DUP 31.5 - 36.5 g/dL    RDW DUP 10.0 - 15.0 %    Platelet Count  - 450 10e9/L   H Pylori antigen, stool   Result Value Ref Range    Specimen Description Feces     H Pylori Antigen       Negative for Helicobacter pylori antigen by enzyme immunoassay. A negative result   indicates the absence of H. pylori antigen or that the level of antigen is below the level   of detection.     CBC with platelets and differential   Result Value Ref Range    WBC 3.5 (L) 4.0 - 11.0 10e9/L    RBC Count 4.48 3.8 - 5.2 10e12/L    Hemoglobin 12.5 11.7 - 15.7 g/dL    Hematocrit 37.6 35.0 - 47.0 %    MCV 84 78 - 100 fl    MCH 27.9 26.5 - 33.0 pg    MCHC 33.2 31.5 - 36.5 g/dL    RDW 11.7 10.0 - 15.0 %    Platelet Count 296 150 - 450 10e9/L    % Neutrophils 36.0 %    % Lymphocytes 53.0 %    % Monocytes 9.0 %    % Eosinophils 1.0 %    % Basophils 1.0 %    Absolute Neutrophil 1.3 (L) 1.6 - 8.3 10e9/L    Absolute Lymphocytes 1.9  0.8 - 5.3 10e9/L    Absolute Monocytes 0.3 0.0 - 1.3 10e9/L    Absolute Eosinophils 0.0 0.0 - 0.7 10e9/L    Absolute Basophils 0.0 0.0 - 0.2 10e9/L    Smudge Cells Present     RBC Morphology Normal     Platelet Estimate       Automated count confirmed.  Platelet morphology is normal.    Diff Method Automated Method        If you have any questions please call the clinic at [phone].    Sincerely,    Margarita CABRALES     not examined

## 2019-08-28 ENCOUNTER — EMERGENCY (EMERGENCY)
Facility: HOSPITAL | Age: 79
LOS: 1 days | Discharge: HOME | End: 2019-08-28
Attending: EMERGENCY MEDICINE
Payer: MEDICARE

## 2019-08-28 VITALS
DIASTOLIC BLOOD PRESSURE: 74 MMHG | HEART RATE: 58 BPM | RESPIRATION RATE: 18 BRPM | SYSTOLIC BLOOD PRESSURE: 163 MMHG | OXYGEN SATURATION: 96 %

## 2019-08-28 VITALS — HEIGHT: 66 IN | WEIGHT: 160.06 LBS

## 2019-08-28 DIAGNOSIS — H26.9 UNSPECIFIED CATARACT: Chronic | ICD-10-CM

## 2019-08-28 DIAGNOSIS — Z98.89 OTHER SPECIFIED POSTPROCEDURAL STATES: Chronic | ICD-10-CM

## 2019-08-28 DIAGNOSIS — Z90.710 ACQUIRED ABSENCE OF BOTH CERVIX AND UTERUS: Chronic | ICD-10-CM

## 2019-08-28 LAB
ALBUMIN SERPL ELPH-MCNC: 4.5 G/DL — SIGNIFICANT CHANGE UP (ref 3.3–5.2)
ALP SERPL-CCNC: 70 U/L — SIGNIFICANT CHANGE UP (ref 40–120)
ALT FLD-CCNC: 16 U/L — SIGNIFICANT CHANGE UP
ANION GAP SERPL CALC-SCNC: 13 MMOL/L — SIGNIFICANT CHANGE UP (ref 5–17)
APPEARANCE UR: CLEAR — SIGNIFICANT CHANGE UP
AST SERPL-CCNC: 18 U/L — SIGNIFICANT CHANGE UP
BACTERIA # UR AUTO: NEGATIVE — SIGNIFICANT CHANGE UP
BASOPHILS # BLD AUTO: 0.07 K/UL — SIGNIFICANT CHANGE UP (ref 0–0.2)
BASOPHILS NFR BLD AUTO: 1.1 % — SIGNIFICANT CHANGE UP (ref 0–2)
BILIRUB SERPL-MCNC: 0.3 MG/DL — LOW (ref 0.4–2)
BILIRUB UR-MCNC: NEGATIVE — SIGNIFICANT CHANGE UP
BUN SERPL-MCNC: 20 MG/DL — SIGNIFICANT CHANGE UP (ref 8–20)
CALCIUM SERPL-MCNC: 9.1 MG/DL — SIGNIFICANT CHANGE UP (ref 8.6–10.2)
CHLORIDE SERPL-SCNC: 105 MMOL/L — SIGNIFICANT CHANGE UP (ref 98–107)
CO2 SERPL-SCNC: 22 MMOL/L — SIGNIFICANT CHANGE UP (ref 22–29)
COLOR SPEC: YELLOW — SIGNIFICANT CHANGE UP
CREAT SERPL-MCNC: 1.03 MG/DL — SIGNIFICANT CHANGE UP (ref 0.5–1.3)
DIFF PNL FLD: NEGATIVE — SIGNIFICANT CHANGE UP
EOSINOPHIL # BLD AUTO: 0.14 K/UL — SIGNIFICANT CHANGE UP (ref 0–0.5)
EOSINOPHIL NFR BLD AUTO: 2.1 % — SIGNIFICANT CHANGE UP (ref 0–6)
EPI CELLS # UR: ABNORMAL
GLUCOSE SERPL-MCNC: 124 MG/DL — HIGH (ref 70–115)
GLUCOSE UR QL: NEGATIVE MG/DL — SIGNIFICANT CHANGE UP
HCT VFR BLD CALC: 35 % — SIGNIFICANT CHANGE UP (ref 34.5–45)
HGB BLD-MCNC: 11.8 G/DL — SIGNIFICANT CHANGE UP (ref 11.5–15.5)
IMM GRANULOCYTES NFR BLD AUTO: 0.8 % — SIGNIFICANT CHANGE UP (ref 0–1.5)
KETONES UR-MCNC: NEGATIVE — SIGNIFICANT CHANGE UP
LEUKOCYTE ESTERASE UR-ACNC: ABNORMAL
LYMPHOCYTES # BLD AUTO: 1.76 K/UL — SIGNIFICANT CHANGE UP (ref 1–3.3)
LYMPHOCYTES # BLD AUTO: 27 % — SIGNIFICANT CHANGE UP (ref 13–44)
MCHC RBC-ENTMCNC: 30.6 PG — SIGNIFICANT CHANGE UP (ref 27–34)
MCHC RBC-ENTMCNC: 33.7 GM/DL — SIGNIFICANT CHANGE UP (ref 32–36)
MCV RBC AUTO: 90.9 FL — SIGNIFICANT CHANGE UP (ref 80–100)
MONOCYTES # BLD AUTO: 0.56 K/UL — SIGNIFICANT CHANGE UP (ref 0–0.9)
MONOCYTES NFR BLD AUTO: 8.6 % — SIGNIFICANT CHANGE UP (ref 2–14)
NEUTROPHILS # BLD AUTO: 3.95 K/UL — SIGNIFICANT CHANGE UP (ref 1.8–7.4)
NEUTROPHILS NFR BLD AUTO: 60.4 % — SIGNIFICANT CHANGE UP (ref 43–77)
NITRITE UR-MCNC: NEGATIVE — SIGNIFICANT CHANGE UP
PH UR: 6.5 — SIGNIFICANT CHANGE UP (ref 5–8)
PLATELET # BLD AUTO: 210 K/UL — SIGNIFICANT CHANGE UP (ref 150–400)
POTASSIUM SERPL-MCNC: 4.5 MMOL/L — SIGNIFICANT CHANGE UP (ref 3.5–5.3)
POTASSIUM SERPL-SCNC: 4.5 MMOL/L — SIGNIFICANT CHANGE UP (ref 3.5–5.3)
PROT SERPL-MCNC: 7 G/DL — SIGNIFICANT CHANGE UP (ref 6.6–8.7)
PROT UR-MCNC: 15 MG/DL
RBC # BLD: 3.85 M/UL — SIGNIFICANT CHANGE UP (ref 3.8–5.2)
RBC # FLD: 12.4 % — SIGNIFICANT CHANGE UP (ref 10.3–14.5)
RBC CASTS # UR COMP ASSIST: SIGNIFICANT CHANGE UP /HPF (ref 0–4)
SODIUM SERPL-SCNC: 140 MMOL/L — SIGNIFICANT CHANGE UP (ref 135–145)
SP GR SPEC: 1.01 — SIGNIFICANT CHANGE UP (ref 1.01–1.02)
TROPONIN T SERPL-MCNC: <0.01 NG/ML — SIGNIFICANT CHANGE UP (ref 0–0.06)
UROBILINOGEN FLD QL: NEGATIVE MG/DL — SIGNIFICANT CHANGE UP
WBC # BLD: 6.53 K/UL — SIGNIFICANT CHANGE UP (ref 3.8–10.5)
WBC # FLD AUTO: 6.53 K/UL — SIGNIFICANT CHANGE UP (ref 3.8–10.5)
WBC UR QL: SIGNIFICANT CHANGE UP

## 2019-08-28 PROCEDURE — 96374 THER/PROPH/DIAG INJ IV PUSH: CPT

## 2019-08-28 PROCEDURE — 81001 URINALYSIS AUTO W/SCOPE: CPT

## 2019-08-28 PROCEDURE — 84484 ASSAY OF TROPONIN QUANT: CPT

## 2019-08-28 PROCEDURE — 36415 COLL VENOUS BLD VENIPUNCTURE: CPT

## 2019-08-28 PROCEDURE — 84443 ASSAY THYROID STIM HORMONE: CPT

## 2019-08-28 PROCEDURE — 85027 COMPLETE CBC AUTOMATED: CPT

## 2019-08-28 PROCEDURE — 99284 EMERGENCY DEPT VISIT MOD MDM: CPT | Mod: 25

## 2019-08-28 PROCEDURE — 93005 ELECTROCARDIOGRAM TRACING: CPT

## 2019-08-28 PROCEDURE — 70450 CT HEAD/BRAIN W/O DYE: CPT

## 2019-08-28 PROCEDURE — 70450 CT HEAD/BRAIN W/O DYE: CPT | Mod: 26

## 2019-08-28 PROCEDURE — 80053 COMPREHEN METABOLIC PANEL: CPT

## 2019-08-28 PROCEDURE — 93010 ELECTROCARDIOGRAM REPORT: CPT

## 2019-08-28 PROCEDURE — 99284 EMERGENCY DEPT VISIT MOD MDM: CPT

## 2019-08-28 RX ORDER — LOSARTAN POTASSIUM 100 MG/1
50 TABLET, FILM COATED ORAL DAILY
Refills: 0 | Status: DISCONTINUED | OUTPATIENT
Start: 2019-08-28 | End: 2019-09-03

## 2019-08-28 RX ORDER — METOCLOPRAMIDE HCL 10 MG
10 TABLET ORAL ONCE
Refills: 0 | Status: COMPLETED | OUTPATIENT
Start: 2019-08-28 | End: 2019-08-28

## 2019-08-28 RX ORDER — MECLIZINE HCL 12.5 MG
1 TABLET ORAL
Qty: 15 | Refills: 0
Start: 2019-08-28 | End: 2019-09-01

## 2019-08-28 RX ORDER — MECLIZINE HCL 12.5 MG
25 TABLET ORAL ONCE
Refills: 0 | Status: COMPLETED | OUTPATIENT
Start: 2019-08-28 | End: 2019-08-28

## 2019-08-28 RX ORDER — ACETAMINOPHEN 500 MG
650 TABLET ORAL ONCE
Refills: 0 | Status: COMPLETED | OUTPATIENT
Start: 2019-08-28 | End: 2019-08-28

## 2019-08-28 RX ADMIN — Medication 650 MILLIGRAM(S): at 16:32

## 2019-08-28 RX ADMIN — Medication 650 MILLIGRAM(S): at 15:58

## 2019-08-28 RX ADMIN — Medication 25 MILLIGRAM(S): at 15:58

## 2019-08-28 RX ADMIN — Medication 10 MILLIGRAM(S): at 15:59

## 2019-08-28 RX ADMIN — LOSARTAN POTASSIUM 50 MILLIGRAM(S): 100 TABLET, FILM COATED ORAL at 15:58

## 2019-08-28 NOTE — ED PROVIDER NOTE - PHYSICAL EXAMINATION
VITAL SIGNS: I have reviewed nursing notes and confirm.  CONSTITUTIONAL: Well-developed; well-nourished; in no acute distress.  SKIN: Skin exam is warm and dry, no acute rash.  HEAD: Normocephalic; atraumatic.  EYES: PERRL, EOM intact; conjunctiva and sclera clear.  ENT: No nasal discharge; airway clear. Throat clear.  NECK: Supple; non tender.    CARD: S1, S2 normal; no murmurs, gallops, or rubs. Regular rate and rhythm.  RESP: No wheezes,  no rales or rhonchi.   ABD:  soft; non-distended; non-tender;   EXT: Normal ROM. No clubbing, cyanosis or edema.  NEURO: Alert, oriented. Grossly unremarkable. No focal deficits. no facial droop, moves all extremities,  no pronator drift, finger-to-nose wnl, normal gait   PSYCH: Cooperative, appropriate.

## 2019-08-28 NOTE — ED ADULT TRIAGE NOTE - CHIEF COMPLAINT QUOTE
Pt c/o dizziness and weakness beginning last night, hx of brain aneurysm and stroke, also hx of vertigo but does not take medication for it, AOx4

## 2019-08-28 NOTE — ED PROVIDER NOTE - PATIENT PORTAL LINK FT
You can access the FollowMyHealth Patient Portal offered by [facility] by registering at the following website: [email or web address]. By joining [employer]’s FollowMyHealth portal, you will also be able to view your health information using other applications (apps) compatible with our system.

## 2019-08-28 NOTE — ED PROVIDER NOTE - NS ED ROS FT
Review of Systems  •	CONSTITUTIONAL - no  fever, no diaphoresis, no weight change  •	SKIN - no rash  •	HEMATOLOGIC - no bleeding, no bruising  •	EYES - no eye pain, no blurred vision  •	ENT - no change in hearing, no pain  •	RESPIRATORY - no shortness of breath, no cough  •	CARDIAC - no chest pain, no palpitations  •	GI - no abd pain, no nausea, no vomiting, no diarrhea, no constipation, no bleeding  •	GENITO-URINARY - no discharge, no dysuria; no hematuria,   •	ENDO - no polydypsia, no polyurea, no heat/no cold intolerance  •	MUSCULOSKELETAL - no joint pain, no swelling, no redness  •	NEUROLOGIC - +headache, +dizziness, +blurry vision, no weakness, no anesthesia, no paresthesias  •	PSYCH - no anxiety, non suicidal, non homicidal, no hallucination, no depression

## 2019-08-28 NOTE — ED PROVIDER NOTE - PROGRESS NOTE DETAILS
patient report feeling better, no  dizziness. CT head negative. electrolyte wnl. trop negative. comfortable gong home

## 2019-08-28 NOTE — ED PROVIDER NOTE - CLINICAL SUMMARY MEDICAL DECISION MAKING FREE TEXT BOX
78 yo F hx of brain aneurysm, vertigo, migraine, stroke p/w dizziness. no focal neurological deficit. CT head negative. trop negative. blood work wnl. TSH wnl. UA negative. Patient given IVF, tylenol, regland, and meclizine with improvement. comfortable going home on meclizine.

## 2019-08-28 NOTE — ED ADULT NURSE NOTE - OBJECTIVE STATEMENT
Assumed pt. care 15:30. Pt on cardiac monitor and continuos pulse ox. In lead II NSR. Pt. states she has been tired, weak and dizzy. Pt. states it started a few nights ago, dizzy at night and weak during last few days. been dizzy since woke up today. Nausea on and off since this morning. Pt. denies diarrhea, denies pain on urinate, denies strenuous actives and denies vision changes, denies fall or trauma to head.  Pt has been eating and drinking normally. Abdomen soft, nontender, bowel sounds heard. Breath sounds clear.

## 2019-08-28 NOTE — ED ADULT NURSE REASSESSMENT NOTE - NS ED NURSE REASSESS COMMENT FT1
Pt. mental status unchanged. Pt. in no apparent distress. Pt. states she is not as dizzy as before, denies nauesa and denies headache.

## 2019-09-04 ENCOUNTER — RESULT CHARGE (OUTPATIENT)
Age: 79
End: 2019-09-04

## 2019-09-05 ENCOUNTER — NON-APPOINTMENT (OUTPATIENT)
Age: 79
End: 2019-09-05

## 2019-09-05 ENCOUNTER — APPOINTMENT (OUTPATIENT)
Dept: CARDIOLOGY | Facility: CLINIC | Age: 79
End: 2019-09-05
Payer: MEDICARE

## 2019-09-05 VITALS
RESPIRATION RATE: 14 BRPM | WEIGHT: 170 LBS | DIASTOLIC BLOOD PRESSURE: 70 MMHG | HEIGHT: 66 IN | HEART RATE: 62 BPM | BODY MASS INDEX: 27.32 KG/M2 | SYSTOLIC BLOOD PRESSURE: 149 MMHG

## 2019-09-05 DIAGNOSIS — R42 DIZZINESS AND GIDDINESS: ICD-10-CM

## 2019-09-05 PROCEDURE — 93000 ELECTROCARDIOGRAM COMPLETE: CPT

## 2019-09-05 PROCEDURE — 99214 OFFICE O/P EST MOD 30 MIN: CPT

## 2019-09-05 RX ORDER — LEVOCETIRIZINE DIHYDROCHLORIDE 5 MG/1
5 TABLET ORAL
Refills: 0 | Status: DISCONTINUED | COMMUNITY
End: 2019-09-05

## 2019-09-05 RX ORDER — NEBIVOLOL HYDROCHLORIDE 5 MG/1
5 TABLET ORAL
Qty: 90 | Refills: 3 | Status: ACTIVE | COMMUNITY

## 2019-09-12 RX ORDER — MULTIVIT,IRON,MINERALS/LUTEIN
TABLET ORAL DAILY
Refills: 0 | Status: ACTIVE | COMMUNITY

## 2019-09-12 RX ORDER — OMEGA-3-ACID ETHYL ESTERS 1 G/1
CAPSULE, LIQUID FILLED ORAL DAILY
Refills: 0 | Status: ACTIVE | COMMUNITY

## 2019-09-12 RX ORDER — LEVOTHYROXINE SODIUM 50 UG/1
50 TABLET ORAL
Refills: 0 | Status: ACTIVE | COMMUNITY

## 2019-09-18 ENCOUNTER — APPOINTMENT (OUTPATIENT)
Dept: CARDIOLOGY | Facility: CLINIC | Age: 79
End: 2019-09-18
Payer: MEDICARE

## 2019-09-18 PROCEDURE — 93880 EXTRACRANIAL BILAT STUDY: CPT

## 2019-10-03 ENCOUNTER — MED ADMIN CHARGE (OUTPATIENT)
Age: 79
End: 2019-10-03

## 2019-10-03 ENCOUNTER — APPOINTMENT (OUTPATIENT)
Dept: CARDIOLOGY | Facility: CLINIC | Age: 79
End: 2019-10-03
Payer: MEDICARE

## 2019-10-03 PROCEDURE — A9500: CPT

## 2019-10-03 PROCEDURE — 93015 CV STRESS TEST SUPVJ I&R: CPT

## 2019-10-03 PROCEDURE — 78452 HT MUSCLE IMAGE SPECT MULT: CPT

## 2019-10-03 RX ORDER — REGADENOSON 0.08 MG/ML
0.4 INJECTION, SOLUTION INTRAVENOUS
Qty: 4 | Refills: 0 | Status: COMPLETED | OUTPATIENT
Start: 2019-10-03

## 2019-10-03 RX ORDER — AMINOPHYLLINE 25 MG/ML
25 INJECTION, SOLUTION INTRAVENOUS
Qty: 0 | Refills: 0 | Status: COMPLETED | OUTPATIENT
Start: 2019-10-03

## 2019-10-03 RX ADMIN — AMINOPHYLLINE 3 MG/ML: 25 INJECTION, SOLUTION INTRAVENOUS at 00:00

## 2019-10-03 RX ADMIN — REGADENOSON 4 MG/5ML: 0.08 INJECTION, SOLUTION INTRAVENOUS at 00:00

## 2019-10-07 NOTE — HISTORY OF PRESENT ILLNESS
[FreeTextEntry1] : Mrs. Walker presents today for follow up evaluation status post a recent [facility] ER visit for dizziness hdeadache and blurry vision.  States that her symptoms began when she woke up in the morning.  She was found to be hypertensive with her systolic blood pressure to be in the 200s.  THe patient has a history of vertigo but she states that episode did not feel the same.  While in the ER, she had a head CT which revealed no evidence of hemorrhage or mass effect.  She was treated with IV fluids and meclizine and was discharged home.  Denies any associated chest pain or palpitations with the episode.  She occasionally experience shortness of breath with stair climbing.  The patient and her daughter also say that she has intermittent moments of profuse diaphoresis for no apparent reason.  Patient is compliant with her medications.

## 2019-10-07 NOTE — REASON FOR VISIT
[FreeTextEntry1] : The patient is a 79 y.o. white female with a past medical history significant for hypertension, hyperlipidemia, coronary artery disease status-post RCA stenting (4/17) and CVA who presents today for evaluation.

## 2019-10-07 NOTE — DISCUSSION/SUMMARY
[FreeTextEntry1] : Case and plan discussed with Dr. Jane.\par \par 1 - Dizziness/Vertigo:  patient referred to ENT.  Dr. Chadwick for further evaluation.  WIll have patient undergo 30-day ambulatory event monitoring for assess for arrhythmias.\par \par 2 - Hypertension:  blood pressure submaximally controlled on current medications.  Advised to follow strict low sodium diet and weight loss.\par \par 3 - Coronary artery disease status-post RCA stenting:  presently denied exertional chest pain, or palpitations.  Occasional shortness of breath with stair climbing.  Will schedule nuclear stress test.\par \par 4 - History of CVA:  will schedule carotid duplex as well.\par \par 5 - Follow up after all testing is completed.

## 2019-10-07 NOTE — PHYSICAL EXAM
[General Appearance - Well Developed] : well developed [General Appearance - In No Acute Distress] : no acute distress [Normal Conjunctiva] : the conjunctiva exhibited no abnormalities [Normal Oral Mucosa] : normal oral mucosa [] : no respiratory distress [Auscultation Breath Sounds / Voice Sounds] : lungs were clear to auscultation bilaterally [Heart Sounds] : normal S1 and S2 [Heart Rate And Rhythm] : heart rate and rhythm were normal [Edema] : no peripheral edema present [Abnormal Walk] : normal gait [Bowel Sounds] : normal bowel sounds [Skin Color & Pigmentation] : normal skin color and pigmentation [Skin Turgor] : normal skin turgor [Oriented To Time, Place, And Person] : oriented to person, place, and time [Affect] : the affect was normal [Mood] : the mood was normal [FreeTextEntry1] : Without edema

## 2019-11-08 RX ORDER — KIT FOR THE PREPARATION OF TECHNETIUM TC99M SESTAMIBI 1 MG/5ML
INJECTION, POWDER, LYOPHILIZED, FOR SOLUTION PARENTERAL
Refills: 0 | Status: COMPLETED | OUTPATIENT
Start: 2019-11-08

## 2019-11-08 RX ADMIN — KIT FOR THE PREPARATION OF TECHNETIUM TC99M SESTAMIBI 0: 1 INJECTION, POWDER, LYOPHILIZED, FOR SOLUTION PARENTERAL at 00:00

## 2019-11-13 NOTE — ED PROVIDER NOTE - CARE PLAN
Statement Selected Principal Discharge DX:	Dehydration  Secondary Diagnosis:	Non-intractable vomiting with nausea, unspecified vomiting type

## 2019-12-24 ENCOUNTER — APPOINTMENT (OUTPATIENT)
Dept: CARDIOLOGY | Facility: CLINIC | Age: 79
End: 2019-12-24

## 2019-12-24 NOTE — REASON FOR VISIT
[FreeTextEntry1] : The patient is a 79 year old white female with a past medical history significant for hypertension, hyperlipidemia, coronary artery disease status-post RCA stenting (4/17) and CVA who presents today for evaluation.

## 2019-12-24 NOTE — PHYSICAL EXAM
[General Appearance - In No Acute Distress] : no acute distress [General Appearance - Well Developed] : well developed [Normal Conjunctiva] : the conjunctiva exhibited no abnormalities [Normal Oral Mucosa] : normal oral mucosa [] : no respiratory distress [Auscultation Breath Sounds / Voice Sounds] : lungs were clear to auscultation bilaterally [Heart Rate And Rhythm] : heart rate and rhythm were normal [Heart Sounds] : normal S1 and S2 [Edema] : no peripheral edema present [Bowel Sounds] : normal bowel sounds [Abnormal Walk] : normal gait [Skin Color & Pigmentation] : normal skin color and pigmentation [FreeTextEntry1] : Without edema [Oriented To Time, Place, And Person] : oriented to person, place, and time [Skin Turgor] : normal skin turgor [Mood] : the mood was normal [Affect] : the affect was normal

## 2020-03-13 ENCOUNTER — APPOINTMENT (OUTPATIENT)
Dept: CARDIOLOGY | Facility: CLINIC | Age: 80
End: 2020-03-13
Payer: MEDICARE

## 2020-03-13 VITALS
DIASTOLIC BLOOD PRESSURE: 60 MMHG | SYSTOLIC BLOOD PRESSURE: 140 MMHG | RESPIRATION RATE: 14 BRPM | HEIGHT: 66 IN | WEIGHT: 166 LBS | BODY MASS INDEX: 26.68 KG/M2 | HEART RATE: 64 BPM

## 2020-03-13 PROCEDURE — 93000 ELECTROCARDIOGRAM COMPLETE: CPT

## 2020-03-13 PROCEDURE — 99214 OFFICE O/P EST MOD 30 MIN: CPT

## 2020-04-01 NOTE — ASSESSMENT
[FreeTextEntry1] : \par 1.  EKG today reveals sinus rhythm at 64 bpm.  1o AV block.  Incomplete right bundle branch block.  No acute ischemic changes. \par \par 2.  Coronary artery disease:  Patient status-post RCA stent insertion in 2017.  Recent nuclear stress test once again demonstrates an element of reversibility suggesting ischemia in the left ventricular apex.  This is noted in conjunction with breast attenuation artifact.  These findings are similar to those of 2017. \par \par 3.  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  The proximal internal carotid artery demonstrates mild plaquing as does the proximal right internal carotid artery.  Given these findings, I will refer the patient to Dr. Herron for vascular assessment.  Patient advised to continue with current statin therapy and follow a strict low-fat / low-cholesterol diet.  \par \par 4.  30-day ambulatory event monitor revealed low frequency of PVCs and APCs, but no other significant findings. \par \par 5.  Given the above, the patient is advised on a low-fat / low-cholesterol diet and regular aerobic exercise.  She will follow up with Dr. Herron and return to see me within three months.  \par  Term male infant born at 40 weeks GA via vaginal delivery with IOL for maternal hx of pre-eclampsia to a  GBS negative mother. Apgars were 9 and 9 at 1 and 5 minutes respectively. Birth weight 3470g, infant is AGA. Prenatal labs were negative. Maternal blood type B+. Admitted to [facility] for routine care.     Physical Exam:    Infant appears active, with normal color, normal  cry.    Skin is intact, no lesions. No jaundice.    Scalp is normal with open, soft, flat fontanels, normal sutures, no edema or hematoma.      Eyes with nl light reflex b/l, sclera clear, Ears symmetric, cartilage well formed, no pits or tags, Nares patent b/l, palate intact, lips and tongue normal.    Normal spontaneous respirations with no retractions, clear to auscultation b/l.    Strong, regular heart beat with possible murmur, will re-access tomorrow. PMI normal, 2+ b/l femoral pulses. Thorax appears symmetric.    Abdomen soft, normal bowel sounds, no masses palpated, no spleen palpated, umbilicus nl with 2 art 1 vein.    Spine normal with no midline defects, anus patent.    Hips normal b/l, neg ortalani,  neg bowman    Ext normal x 4, 10 fingers 10 toes b/l. No clavicular crepitus or tenderness.    Good tone, no lethargy, normal cry, suck, grasp, shar, swallow.    Genitalia normal    A/P: Well . Physical Exam within normal limits. Feeding ad mayela. Routine care.

## 2020-04-01 NOTE — REASON FOR VISIT
[FreeTextEntry1] : Mrs. 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 \par \par From a cardiac standpoint, Mrs. Walker denies exertional chest pain, shortness of breath, or other symptoms.  She remains sedentary.  \par

## 2020-04-27 DIAGNOSIS — I65.23 OCCLUSION AND STENOSIS OF BILATERAL CAROTID ARTERIES: ICD-10-CM

## 2020-04-28 ENCOUNTER — APPOINTMENT (OUTPATIENT)
Dept: VASCULAR SURGERY | Facility: CLINIC | Age: 80
End: 2020-04-28

## 2020-06-18 ENCOUNTER — APPOINTMENT (OUTPATIENT)
Dept: CARDIOLOGY | Facility: CLINIC | Age: 80
End: 2020-06-18

## 2020-11-17 ENCOUNTER — APPOINTMENT (OUTPATIENT)
Dept: VASCULAR SURGERY | Facility: CLINIC | Age: 80
End: 2020-11-17
Payer: MEDICARE

## 2020-11-17 VITALS
OXYGEN SATURATION: 98 % | WEIGHT: 156 LBS | BODY MASS INDEX: 25.07 KG/M2 | HEIGHT: 66 IN | TEMPERATURE: 97.8 F | SYSTOLIC BLOOD PRESSURE: 135 MMHG | HEART RATE: 65 BPM | DIASTOLIC BLOOD PRESSURE: 65 MMHG

## 2020-11-17 PROCEDURE — 99203 OFFICE O/P NEW LOW 30 MIN: CPT

## 2020-11-17 PROCEDURE — 93880 EXTRACRANIAL BILAT STUDY: CPT

## 2020-12-16 ENCOUNTER — APPOINTMENT (OUTPATIENT)
Dept: CARDIOLOGY | Facility: CLINIC | Age: 80
End: 2020-12-16
Payer: MEDICARE

## 2020-12-16 VITALS
RESPIRATION RATE: 14 BRPM | TEMPERATURE: 96.8 F | HEART RATE: 65 BPM | SYSTOLIC BLOOD PRESSURE: 116 MMHG | HEIGHT: 67 IN | BODY MASS INDEX: 24.33 KG/M2 | DIASTOLIC BLOOD PRESSURE: 64 MMHG | WEIGHT: 155 LBS

## 2020-12-16 DIAGNOSIS — I65.22 OCCLUSION AND STENOSIS OF LEFT CAROTID ARTERY: ICD-10-CM

## 2020-12-16 PROCEDURE — 99214 OFFICE O/P EST MOD 30 MIN: CPT

## 2020-12-16 PROCEDURE — 93000 ELECTROCARDIOGRAM COMPLETE: CPT

## 2020-12-16 RX ORDER — ATORVASTATIN CALCIUM 20 MG/1
20 TABLET, FILM COATED ORAL
Qty: 90 | Refills: 3 | Status: ACTIVE | COMMUNITY
Start: 1900-01-01 | End: 1900-01-01

## 2020-12-17 PROBLEM — I65.22 OCCLUSION OF LEFT CAROTID ARTERY: Status: ACTIVE | Noted: 2020-03-13

## 2020-12-21 NOTE — REASON FOR VISIT
[FreeTextEntry1] : Mrs. 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  \par

## 2020-12-21 NOTE — ASSESSMENT
[FreeTextEntry1] : 1.  EKG today reveals normal sinus rhythm at 65 bpm.  First degree AV block.  Incomplete right bundle branch block.  No acute ischemic changes. \par \par 2.  Hypertension:  Blood pressure on the low-side at this time.  I have advised patient to reduce Losartan to 50 mg once a day from 50 mg b.i.d.  \par \par 3.  Hyperlipidemia:  Recent bloodwork reveals a total cholesterol of 163, HDL 41, TC/HDL ratio 4.0, LDL 83, triglycerides 196.  Patient advised to follow a low-fat / low-cholesterol diet.  Will augment Atorvastatin from 10 to 20 mg q.h.s.  Repeat bloodwork in 6-8 weeks. \par \par 4.  Coronary artery disease status-post stent insertion of her RCA:  Patient clinically stable, advised to walk as much as possible.  Office visit six months.    \par

## 2020-12-21 NOTE — HISTORY OF PRESENT ILLNESS
[FreeTextEntry1] : From a cardiac standpoint, Mrs. Walker remains clinically stable denying chest pain, shortness of breath, palpitations, lightheadedness, or syncope.

## 2021-01-30 NOTE — ED PROVIDER NOTE - OBJECTIVE STATEMENT
-- DO NOT REPLY / DO NOT REPLY ALL --  -- Message is from the [facility]--    COVID-19 Universal Screening: N/A - Not about scheduling    General Patient Message      Reason for Call: Patient is requesting a medication refill for the medication diltiazem 120 milligrams. Patient is out of medication. He received this medication at one of the doctor at his emergency room visit December 2020. Requesting a call back.     Caller Information       Type Contact Phone    01/30/2021 05:15 PM CST Phone (Incoming) Eliseo Huddleston (Self) [phone] (M)          Alternative phone number: N/A        Did the caller agree that this message can wait until the office reopens on Monday (or after the holiday)? YES - The Message Can Wait      Send a message to the provider's clinical support pool.     Turnaround time given to caller:   \"This message will be sent to [state Provider's name]. The clinical team will fulfill your request as soon as they review your message when the office opens on Monday (or after the holiday).\"       78 y/o F pt with significant PMHx of Brain Aneurysm s/p coils, Stroke (Right side deficits), Vertigo, Migraines presents to the ED c/o dizziness that started this morning when she woke up. She does not feel as if the room is spinning, and her symptoms are not similar to her past stroke or vertigo. Pt has also been having HA's with occasional blurry vision. This morning she took Tylenol and it provided no symptomatic relief. Denies N/V, CP, abd pain. No further complaints at this time. 78 y/o F pt with significant PMHx of Brain Aneurysm s/p coils, Stroke (Right side deficits), Vertigo, Migraines and HTN presents to the ED c/o dizziness that started this morning when she woke up. She does not feel as if the room is spinning, and her symptoms are not similar to her past stroke or vertigo. Pt has also been having HA's with occasional blurry vision. Pt states on route to the ER her blood pressure was in the 200s, per EMS. This morning she took Tylenol and it provided no symptomatic relief, and did not miss her HTN medication. Denies N/V, CP, abd pain. No further complaints at this time.

## 2021-04-23 NOTE — ED ADULT NURSE NOTE - CADM POA CENTRAL LINE
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

## 2021-05-27 ENCOUNTER — APPOINTMENT (OUTPATIENT)
Dept: CARDIOLOGY | Facility: CLINIC | Age: 81
End: 2021-05-27
Payer: MEDICARE

## 2021-05-27 VITALS
TEMPERATURE: 97.3 F | BODY MASS INDEX: 25.9 KG/M2 | HEIGHT: 67 IN | RESPIRATION RATE: 14 BRPM | SYSTOLIC BLOOD PRESSURE: 112 MMHG | HEART RATE: 66 BPM | WEIGHT: 165 LBS | DIASTOLIC BLOOD PRESSURE: 50 MMHG

## 2021-05-27 DIAGNOSIS — R42 DIZZINESS AND GIDDINESS: ICD-10-CM

## 2021-05-27 PROCEDURE — 93000 ELECTROCARDIOGRAM COMPLETE: CPT

## 2021-05-27 PROCEDURE — 99214 OFFICE O/P EST MOD 30 MIN: CPT

## 2021-06-01 NOTE — ASSESSMENT
[FreeTextEntry1] : 1.  EKG today reveals sinus rhythm at 66 bpm.  First degree AV block.  Incomplete right bundle branch block.  Low-voltage QRS.  Questionable anterior wall MI.  No acute ischemic changes. \par \par 2.  Hypertension:  Blood pressure well controlled at this time on current medications.  \par \par 3.  Hyperlipidemia:  Recent lipid profile reveals a total cholesterol of 148, HDL 48, TC/HDL ratio 3.1, LDL 47, triglycerides 265.  This is noted along with a hemoglobin A1C of 6.2.  Patient advised to continue with current statin therapy and follow a low-fat / low-cholesterol diet as well as a low-carbohydrate diet.  She should follow up with her PCP regarding pre-diabetic management.  \par \par 4.  Coronary artery disease status-post RCA stent insertion:  Clinically stable at this time without chest pain, shortness of breath, or other symptoms.  \par \par 5.  Fatigue:  Patient may well have lower blood pressure than she needs at this time.  I have advised her to reduce her Losartan from 50 mg daily to 25 mg daily.  She is to watch her salt and return to see me in three months if clinically stable.    \par

## 2021-06-01 NOTE — REASON FOR VISIT
[FreeTextEntry1] : Mrs. 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  \par \par

## 2021-06-01 NOTE — HISTORY OF PRESENT ILLNESS
[FreeTextEntry1] :  From a cardiac standpoint, Mrs. Walker denies exertional chest pain, shortness of breath, palpitations, lightheadedness, or syncope.

## 2021-06-25 NOTE — ASU PATIENT PROFILE, ADULT - TEACHING/LEARNING FACTORS IMPACT ABILITY TO LEARN
RN cannot approve Refill Request    RN can NOT refill this medication PCP messaged that patient is overdue for Labs. Last office visit: 2/18/2021 Adelina Enciso FNP Last Physical: Visit date not found Last MTM visit: Visit date not found Last visit same specialty: 2/18/2021 Adelina Enciso FNP.  Next visit within 3 mo: Visit date not found  Next physical within 3 mo: Visit date not found      Zuleyka Huston, [facility] Triage/Med Refill 5/27/2021    Requested Prescriptions   Pending Prescriptions Disp Refills     venlafaxine (EFFEXOR-XR) 75 MG 24 hr capsule [Pharmacy Med Name: VENLAFAXINE  ER 75 MG CAP] 28 capsule 11     Sig: TAKE 1 CAPSULE BY MOUTH ONCE DAILY       Venlafaxine/Desvenlafaxine Refill Protocol Failed - 5/27/2021  2:09 PM        Failed - Fasting lipid cascade in last year     Cholesterol   Date Value Ref Range Status   05/05/2016 183 <=200 mg/dL Final     Triglycerides   Date Value Ref Range Status   05/05/2016 154 (H) 0 - 150 mg/dL Final     HDL Cholesterol   Date Value Ref Range Status   05/05/2016 42 >=40 mg/dL Final     LDL Calculated   Date Value Ref Range Status   05/05/2016 110 0 - 130 mg/dL Final             Passed - LFT or AST or ALT in last year     Albumin   Date Value Ref Range Status   12/19/2020 4.0 3.5 - 5.0 g/dL Final     Bilirubin, Total   Date Value Ref Range Status   12/19/2020 0.4 0.0 - 1.0 mg/dL Final     Alkaline Phosphatase   Date Value Ref Range Status   12/19/2020 62 45 - 120 U/L Final     AST   Date Value Ref Range Status   12/19/2020 26 0 - 40 U/L Final     ALT   Date Value Ref Range Status   12/19/2020 29 0 - 45 U/L Final     Protein, Total   Date Value Ref Range Status   12/19/2020 6.7 6.0 - 8.0 g/dL Final                Passed - PCP or prescribing provider visit in last year     Last office visit with prescriber/PCP: 2/18/2021 Adelina Enciso FNP OR same dept: 2/18/2021 Adelina Enciso FNP OR same specialty: 2/18/2021 Adelina Enciso, FNP   Last physical: Visit date not found Last MTM visit: Visit date not found   Next visit within 3 mo: Visit date not found  Next physical within 3 mo: Visit date not found  Prescriber OR PCP: RUPERT Worthington  Last diagnosis associated with med order: 1. Major depressive disorder, recurrent episode, moderate (H)  - venlafaxine (EFFEXOR-XR) 75 MG 24 hr capsule [Pharmacy Med Name: VENLAFAXINE  ER 75 MG CAP]; TAKE 1 CAPSULE BY MOUTH ONCE DAILY  Dispense: 28 capsule; Refill: 11  - venlafaxine (EFFEXOR-XR) 150 MG 24 hr capsule [Pharmacy Med Name: VENLAFAXINE  MG CAP]; TAKE 1 CAPSULE BY MOUTH ONCE DAILY (TO MAKE 225MG)  Dispense: 28 capsule; Refill: 11    If protocol passes may refill for 12 months if within 3 months of last provider visit (or a total of 15 months).             Passed - Blood Pressure in last year     BP Readings from Last 1 Encounters:   02/18/21 126/84                venlafaxine (EFFEXOR-XR) 150 MG 24 hr capsule [Pharmacy Med Name: VENLAFAXINE  MG CAP] 28 capsule 11     Sig: TAKE 1 CAPSULE BY MOUTH ONCE DAILY (TO MAKE 225MG)       Venlafaxine/Desvenlafaxine Refill Protocol Failed - 5/27/2021  2:09 PM        Failed - Fasting lipid cascade in last year     Cholesterol   Date Value Ref Range Status   05/05/2016 183 <=200 mg/dL Final     Triglycerides   Date Value Ref Range Status   05/05/2016 154 (H) 0 - 150 mg/dL Final     HDL Cholesterol   Date Value Ref Range Status   05/05/2016 42 >=40 mg/dL Final     LDL Calculated   Date Value Ref Range Status   05/05/2016 110 0 - 130 mg/dL Final             Passed - LFT or AST or ALT in last year     Albumin   Date Value Ref Range Status   12/19/2020 4.0 3.5 - 5.0 g/dL Final     Bilirubin, Total   Date Value Ref Range Status   12/19/2020 0.4 0.0 - 1.0 mg/dL Final     Alkaline Phosphatase   Date Value Ref Range Status   12/19/2020 62 45 - 120 U/L Final     AST   Date Value Ref Range Status   12/19/2020 26 0 - 40 U/L Final     ALT   Date Value Ref Range  Status   12/19/2020 29 0 - 45 U/L Final     Protein, Total   Date Value Ref Range Status   12/19/2020 6.7 6.0 - 8.0 g/dL Final                Passed - PCP or prescribing provider visit in last year     Last office visit with prescriber/PCP: 2/18/2021 Adelina Enciso, RUPERT OR same dept: 2/18/2021 Adelina Enciso, RUPERT OR same specialty: 2/18/2021 Adelina Enciso FNP  Last physical: Visit date not found Last MTM visit: Visit date not found   Next visit within 3 mo: Visit date not found  Next physical within 3 mo: Visit date not found  Prescriber OR PCP: RUPERT Worthington  Last diagnosis associated with med order: 1. Major depressive disorder, recurrent episode, moderate (H)  - venlafaxine (EFFEXOR-XR) 75 MG 24 hr capsule [Pharmacy Med Name: VENLAFAXINE  ER 75 MG CAP]; TAKE 1 CAPSULE BY MOUTH ONCE DAILY  Dispense: 28 capsule; Refill: 11  - venlafaxine (EFFEXOR-XR) 150 MG 24 hr capsule [Pharmacy Med Name: VENLAFAXINE  MG CAP]; TAKE 1 CAPSULE BY MOUTH ONCE DAILY (TO MAKE 225MG)  Dispense: 28 capsule; Refill: 11    If protocol passes may refill for 12 months if within 3 months of last provider visit (or a total of 15 months).             Passed - Blood Pressure in last year     BP Readings from Last 1 Encounters:   02/18/21 126/84                    none

## 2021-08-11 ENCOUNTER — APPOINTMENT (OUTPATIENT)
Dept: CARDIOLOGY | Facility: CLINIC | Age: 81
End: 2021-08-11
Payer: MEDICARE

## 2021-08-11 VITALS
DIASTOLIC BLOOD PRESSURE: 60 MMHG | SYSTOLIC BLOOD PRESSURE: 120 MMHG | HEART RATE: 68 BPM | BODY MASS INDEX: 25.43 KG/M2 | RESPIRATION RATE: 14 BRPM | WEIGHT: 162 LBS | HEIGHT: 67 IN

## 2021-08-11 DIAGNOSIS — I25.10 ATHEROSCLEROTIC HEART DISEASE OF NATIVE CORONARY ARTERY W/OUT ANGINA PECTORIS: ICD-10-CM

## 2021-08-11 DIAGNOSIS — Z74.09 OTHER REDUCED MOBILITY: ICD-10-CM

## 2021-08-11 PROCEDURE — 93000 ELECTROCARDIOGRAM COMPLETE: CPT

## 2021-08-11 PROCEDURE — 99214 OFFICE O/P EST MOD 30 MIN: CPT

## 2021-08-11 RX ORDER — LOSARTAN POTASSIUM 25 MG/1
25 TABLET, FILM COATED ORAL
Qty: 90 | Refills: 3 | Status: ACTIVE | COMMUNITY

## 2021-08-11 RX ORDER — LOSARTAN POTASSIUM 25 MG/1
25 TABLET, FILM COATED ORAL DAILY
Qty: 90 | Refills: 1 | Status: DISCONTINUED | COMMUNITY
Start: 2021-05-27 | End: 2021-08-11

## 2021-08-14 PROBLEM — I25.10 CORONARY ARTERY DISEASE, ANGINA PRESENCE UNSPECIFIED, UNSPECIFIED VESSEL OR LESION TYPE, UNSPECIFIED WHETHER NATIVE OR TRANSPLANTED HEART: Status: ACTIVE | Noted: 2018-01-08

## 2021-08-18 NOTE — REASON FOR VISIT
[FreeTextEntry1] : Mrs. 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  \par \par

## 2021-08-18 NOTE — ASSESSMENT
[FreeTextEntry1] : 1.  EKG today reveals sinus rhythm at 68 bpm.  First degree AV block.  Incomplete right bundle branch block.  Low QRS voltage.  No acute ischemic changes. \par \par 2.  Dyspnea on exertion:  Patient with known history of coronary artery disease status-post RCA stent insertion 2017.  This may well represent an anginal equivalent.  I have advised her to undergo both echocardiography as well as a nuclear stress test for further evaluation.  \par \par 3.  Hypertension:  Blood pressure well controlled at this time on current medications.   A low-salt diet is advised. \par \par 4.  Hyperlipidemia:  Review of recent bloodwork does not include a lipid profile.  I have advised patient on a strict low-fat / low-cholesterol diet.  Will undergo fasting bloodwork prior to her next office visit.  \par

## 2021-08-18 NOTE — PHYSICAL EXAM
[General Appearance - Well Developed] : well developed [General Appearance - Well Nourished] : well nourished [General Appearance - In No Acute Distress] : no acute distress [Normal Conjunctiva] : the conjunctiva exhibited no abnormalities [Normal Oral Mucosa] : normal oral mucosa [Auscultation Breath Sounds / Voice Sounds] : lungs were clear to auscultation bilaterally [Heart Rate And Rhythm] : heart rate and rhythm were normal [Heart Sounds] : normal S1 and S2 [Murmurs] : no murmurs present [Systolic grade ___/6] : A grade [unfilled]/6 systolic murmur was heard. [Bowel Sounds] : normal bowel sounds [Abnormal Walk] : normal gait [Skin Color & Pigmentation] : normal skin color and pigmentation [Skin Turgor] : normal skin turgor [Affect] : the affect was normal [Oriented To Time, Place, And Person] : oriented to person, place, and time [Mood] : the mood was normal [FreeTextEntry1] : No edema

## 2021-08-18 NOTE — HISTORY OF PRESENT ILLNESS
[FreeTextEntry1] : From a cardiac standpoint, Mrs. Walker denies exertional chest pain, but she has recently developed dyspnea on exertion which occurs when walking fast, walking up inclines, or climbing stairs.

## 2021-08-27 ENCOUNTER — APPOINTMENT (OUTPATIENT)
Dept: CARDIOLOGY | Facility: CLINIC | Age: 81
End: 2021-08-27
Payer: MEDICARE

## 2021-08-27 PROCEDURE — 93306 TTE W/DOPPLER COMPLETE: CPT

## 2021-08-27 PROCEDURE — 93880 EXTRACRANIAL BILAT STUDY: CPT

## 2021-08-29 ENCOUNTER — TRANSCRIPTION ENCOUNTER (OUTPATIENT)
Age: 81
End: 2021-08-29

## 2021-10-06 ENCOUNTER — TRANSCRIPTION ENCOUNTER (OUTPATIENT)
Age: 81
End: 2021-10-06

## 2021-12-11 ENCOUNTER — TRANSCRIPTION ENCOUNTER (OUTPATIENT)
Age: 81
End: 2021-12-11

## 2021-12-13 ENCOUNTER — APPOINTMENT (OUTPATIENT)
Dept: CARDIOLOGY | Facility: CLINIC | Age: 81
End: 2021-12-13
Payer: MEDICARE

## 2021-12-13 PROCEDURE — A9500: CPT

## 2021-12-13 PROCEDURE — 93015 CV STRESS TEST SUPVJ I&R: CPT

## 2021-12-13 PROCEDURE — 78452 HT MUSCLE IMAGE SPECT MULT: CPT

## 2021-12-13 RX ADMIN — REGADENOSON 0 MG/5ML: 0.08 INJECTION, SOLUTION INTRAVENOUS at 00:00

## 2021-12-15 RX ORDER — REGADENOSON 0.08 MG/ML
0.4 INJECTION, SOLUTION INTRAVENOUS
Qty: 4 | Refills: 0 | Status: COMPLETED | OUTPATIENT
Start: 2021-12-13

## 2022-01-01 NOTE — ED PROVIDER NOTE - PSH
Cataract of both eyes    S/P appendectomy    S/P brain surgery    S/P hysterectomy    S/P tonsillectomy nuchal cord

## 2022-01-05 ENCOUNTER — APPOINTMENT (OUTPATIENT)
Dept: CARDIOLOGY | Facility: CLINIC | Age: 82
End: 2022-01-05

## 2022-01-27 ENCOUNTER — APPOINTMENT (OUTPATIENT)
Dept: CARDIOLOGY | Facility: CLINIC | Age: 82
End: 2022-01-27

## 2022-02-01 RX ORDER — KIT FOR THE PREPARATION OF TECHNETIUM TC99M SESTAMIBI 1 MG/5ML
INJECTION, POWDER, LYOPHILIZED, FOR SOLUTION PARENTERAL
Refills: 0 | Status: COMPLETED | OUTPATIENT
Start: 2022-02-01

## 2022-02-01 RX ADMIN — KIT FOR THE PREPARATION OF TECHNETIUM TC99M SESTAMIBI 0: 1 INJECTION, POWDER, LYOPHILIZED, FOR SOLUTION PARENTERAL at 00:00

## 2022-04-07 ENCOUNTER — NON-APPOINTMENT (OUTPATIENT)
Age: 82
End: 2022-04-07

## 2022-04-07 ENCOUNTER — APPOINTMENT (OUTPATIENT)
Dept: CARDIOLOGY | Facility: CLINIC | Age: 82
End: 2022-04-07
Payer: MEDICARE

## 2022-04-07 VITALS
SYSTOLIC BLOOD PRESSURE: 138 MMHG | RESPIRATION RATE: 14 BRPM | HEIGHT: 67 IN | BODY MASS INDEX: 27.31 KG/M2 | WEIGHT: 174 LBS | DIASTOLIC BLOOD PRESSURE: 60 MMHG | HEART RATE: 67 BPM

## 2022-04-07 DIAGNOSIS — R06.00 DYSPNEA, UNSPECIFIED: ICD-10-CM

## 2022-04-07 DIAGNOSIS — R53.83 OTHER FATIGUE: ICD-10-CM

## 2022-04-07 DIAGNOSIS — I65.29 OCCLUSION AND STENOSIS OF UNSPECIFIED CAROTID ARTERY: ICD-10-CM

## 2022-04-07 PROCEDURE — 93000 ELECTROCARDIOGRAM COMPLETE: CPT

## 2022-04-07 PROCEDURE — 99214 OFFICE O/P EST MOD 30 MIN: CPT

## 2022-04-07 RX ORDER — LACTOBACILLUS COMBINATION NO.4 3B CELL
CAPSULE ORAL DAILY
Refills: 0 | Status: DISCONTINUED | COMMUNITY
End: 2022-04-07

## 2022-04-07 RX ORDER — LEVOCETIRIZINE DIHYDROCHLORIDE 5 MG/1
5 TABLET ORAL DAILY
Refills: 0 | Status: ACTIVE | COMMUNITY

## 2022-04-08 NOTE — HISTORY OF PRESENT ILLNESS
[FreeTextEntry1] : Mrs. Walker presents today without complaints of exertional chest pain, palpitations, lightheadedness or syncope.  Her major complaints are intermittent shortness of breath with exertion and fatigue.  She has gained 12 pounds since her last office visit in August 2021 and states that she does not sleep well at all.

## 2022-04-08 NOTE — PHYSICAL EXAM
[Well Developed] : well developed [Well Nourished] : well nourished [No Acute Distress] : no acute distress [Normal Conjunctiva] : normal conjunctiva [Normal Venous Pressure] : normal venous pressure [No Carotid Bruit] : no carotid bruit [Normal S1, S2] : normal S1, S2 [No Rub] : no rub [No Gallop] : no gallop [Clear Lung Fields] : clear lung fields [No Respiratory Distress] : no respiratory distress  [Soft] : abdomen soft [Normal Bowel Sounds] : normal bowel sounds [Normal Gait] : normal gait [No Edema] : no edema [No Rash] : no rash [No Skin Lesions] : no skin lesions [Moves all extremities] : moves all extremities [No Focal Deficits] : no focal deficits [Normal Speech] : normal speech [Alert and Oriented] : alert and oriented [Normal memory] : normal memory [de-identified] : I/VI systolic murmur

## 2022-04-08 NOTE — DISCUSSION/SUMMARY
[FreeTextEntry1] : Case and plan discussed with Dr. Jane.\par \par 1 - Hypertension:  blood pressure well controlled on current medications.  Advised to follow strict low sodium diet.\par \par 2 - Coronary artery disease status-post RCA stent 2017:  Denies chest pain, palpitations, lightheadedness or syncope.  Her major complaints are intermittent shortness of breath with exertion and fatigue.  She has gained 12 pounds since her last office visit in August 2021 and states that she does not sleep well at all.\par \par Echocardiogram (8/27/2021):  EF 65-70%. Mild concentric left ventricular hypertrophy.  Trace pulmonic valve regurgitation.  There is a small pericardial effusion.\par \par Pharmacologic nuclear stress test (12/12/13/2021):  Probably normal Sestamibi myocardial perfusion SPECT imaging.  Due to significant breast attenuation and patient motion artifact, the sensitivity and specificity of the test are reduced.  Cannot exclude mild septal/apical ischemia.\par \par Will add isosorbide mononitrate 30mg daily and will reassess in 6 weeks.  If symptoms do not improve, will consider cardiac catheterization.\par \par 3 - Peripheral artery disease:  patient with known total occlusion of the left mid ICA.   Mild stenosis of the left and right proximal ICA.  Mild to moderate stenosis of the left ECA.  Normal right ECA.\par \par Carotid duplex (8/27/2021):  1-49% of the left proximal ICA.  There is total occlusion of the left mid ICA.  No hemodynamically significant stenosis is noted in the right ICA.  There is <50% stenosis of the left ECA.  Normal right ECA.  When compared to prior study dated 9/18/2019, there has been no significant interval change.\par \par 4 - Follow up in 6 weeks.

## 2022-04-11 ENCOUNTER — INPATIENT (INPATIENT)
Facility: HOSPITAL | Age: 82
LOS: 3 days | Discharge: HOME | DRG: 305 | End: 2022-04-15
Attending: FAMILY MEDICINE | Admitting: HOSPITALIST
Payer: MEDICARE

## 2022-04-11 VITALS
HEIGHT: 66 IN | HEART RATE: 48 BPM | SYSTOLIC BLOOD PRESSURE: 207 MMHG | RESPIRATION RATE: 20 BRPM | DIASTOLIC BLOOD PRESSURE: 74 MMHG

## 2022-04-11 DIAGNOSIS — Z90.710 ACQUIRED ABSENCE OF BOTH CERVIX AND UTERUS: Chronic | ICD-10-CM

## 2022-04-11 DIAGNOSIS — Z98.89 OTHER SPECIFIED POSTPROCEDURAL STATES: Chronic | ICD-10-CM

## 2022-04-11 DIAGNOSIS — I16.0 HYPERTENSIVE URGENCY: ICD-10-CM

## 2022-04-11 DIAGNOSIS — H26.9 UNSPECIFIED CATARACT: Chronic | ICD-10-CM

## 2022-04-11 LAB
ALBUMIN SERPL ELPH-MCNC: 3.9 G/DL — SIGNIFICANT CHANGE UP (ref 3.3–5.2)
ALP SERPL-CCNC: 78 U/L — SIGNIFICANT CHANGE UP (ref 40–120)
ALT FLD-CCNC: 15 U/L — SIGNIFICANT CHANGE UP
ANION GAP SERPL CALC-SCNC: 14 MMOL/L — SIGNIFICANT CHANGE UP (ref 5–17)
ANION GAP SERPL CALC-SCNC: 15 MMOL/L — SIGNIFICANT CHANGE UP (ref 5–17)
APTT BLD: 25.2 SEC — LOW (ref 27.5–35.5)
AST SERPL-CCNC: 16 U/L — SIGNIFICANT CHANGE UP
BASOPHILS # BLD AUTO: 0.07 K/UL — SIGNIFICANT CHANGE UP (ref 0–0.2)
BASOPHILS NFR BLD AUTO: 0.6 % — SIGNIFICANT CHANGE UP (ref 0–2)
BILIRUB SERPL-MCNC: 0.5 MG/DL — SIGNIFICANT CHANGE UP (ref 0.4–2)
BUN SERPL-MCNC: 17 MG/DL — SIGNIFICANT CHANGE UP (ref 8–20)
BUN SERPL-MCNC: 19 MG/DL — SIGNIFICANT CHANGE UP (ref 8–20)
CALCIUM SERPL-MCNC: 8 MG/DL — LOW (ref 8.6–10.2)
CALCIUM SERPL-MCNC: 8.9 MG/DL — SIGNIFICANT CHANGE UP (ref 8.6–10.2)
CHLORIDE SERPL-SCNC: 103 MMOL/L — SIGNIFICANT CHANGE UP (ref 98–107)
CHLORIDE SERPL-SCNC: 81 MMOL/L — LOW (ref 98–107)
CO2 SERPL-SCNC: 18 MMOL/L — LOW (ref 22–29)
CO2 SERPL-SCNC: 22 MMOL/L — SIGNIFICANT CHANGE UP (ref 22–29)
CREAT SERPL-MCNC: 1.02 MG/DL — SIGNIFICANT CHANGE UP (ref 0.5–1.3)
CREAT SERPL-MCNC: 1.08 MG/DL — SIGNIFICANT CHANGE UP (ref 0.5–1.3)
EGFR: 52 ML/MIN/1.73M2 — LOW
EGFR: 55 ML/MIN/1.73M2 — LOW
EOSINOPHIL # BLD AUTO: 0.05 K/UL — SIGNIFICANT CHANGE UP (ref 0–0.5)
EOSINOPHIL NFR BLD AUTO: 0.4 % — SIGNIFICANT CHANGE UP (ref 0–6)
FLUAV AG NPH QL: SIGNIFICANT CHANGE UP
FLUBV AG NPH QL: SIGNIFICANT CHANGE UP
GLUCOSE SERPL-MCNC: 166 MG/DL — HIGH (ref 70–99)
GLUCOSE SERPL-MCNC: 952 MG/DL — CRITICAL HIGH (ref 70–99)
HCT VFR BLD CALC: 35.7 % — SIGNIFICANT CHANGE UP (ref 34.5–45)
HGB BLD-MCNC: 12.4 G/DL — SIGNIFICANT CHANGE UP (ref 11.5–15.5)
IMM GRANULOCYTES NFR BLD AUTO: 0.7 % — SIGNIFICANT CHANGE UP (ref 0–1.5)
INR BLD: 1.03 RATIO — SIGNIFICANT CHANGE UP (ref 0.88–1.16)
LIDOCAIN IGE QN: 38 U/L — SIGNIFICANT CHANGE UP (ref 22–51)
LYMPHOCYTES # BLD AUTO: 1.18 K/UL — SIGNIFICANT CHANGE UP (ref 1–3.3)
LYMPHOCYTES # BLD AUTO: 9.7 % — LOW (ref 13–44)
MAGNESIUM SERPL-MCNC: 1.5 MG/DL — LOW (ref 1.8–2.6)
MCHC RBC-ENTMCNC: 30.5 PG — SIGNIFICANT CHANGE UP (ref 27–34)
MCHC RBC-ENTMCNC: 34.7 GM/DL — SIGNIFICANT CHANGE UP (ref 32–36)
MCV RBC AUTO: 87.7 FL — SIGNIFICANT CHANGE UP (ref 80–100)
MONOCYTES # BLD AUTO: 0.59 K/UL — SIGNIFICANT CHANGE UP (ref 0–0.9)
MONOCYTES NFR BLD AUTO: 4.9 % — SIGNIFICANT CHANGE UP (ref 2–14)
NEUTROPHILS # BLD AUTO: 10.16 K/UL — HIGH (ref 1.8–7.4)
NEUTROPHILS NFR BLD AUTO: 83.7 % — HIGH (ref 43–77)
PLATELET # BLD AUTO: 162 K/UL — SIGNIFICANT CHANGE UP (ref 150–400)
POTASSIUM SERPL-MCNC: 4.2 MMOL/L — SIGNIFICANT CHANGE UP (ref 3.5–5.3)
POTASSIUM SERPL-MCNC: 4.3 MMOL/L — SIGNIFICANT CHANGE UP (ref 3.5–5.3)
POTASSIUM SERPL-SCNC: 4.2 MMOL/L — SIGNIFICANT CHANGE UP (ref 3.5–5.3)
POTASSIUM SERPL-SCNC: 4.3 MMOL/L — SIGNIFICANT CHANGE UP (ref 3.5–5.3)
PROT SERPL-MCNC: 6 G/DL — LOW (ref 6.6–8.7)
PROTHROM AB SERPL-ACNC: 11.9 SEC — SIGNIFICANT CHANGE UP (ref 10.5–13.4)
RBC # BLD: 4.07 M/UL — SIGNIFICANT CHANGE UP (ref 3.8–5.2)
RBC # FLD: 12.2 % — SIGNIFICANT CHANGE UP (ref 10.3–14.5)
SARS-COV-2 RNA SPEC QL NAA+PROBE: SIGNIFICANT CHANGE UP
SODIUM SERPL-SCNC: 113 MMOL/L — CRITICAL LOW (ref 135–145)
SODIUM SERPL-SCNC: 140 MMOL/L — SIGNIFICANT CHANGE UP (ref 135–145)
TROPONIN T SERPL-MCNC: <0.01 NG/ML — SIGNIFICANT CHANGE UP (ref 0–0.06)
WBC # BLD: 12.13 K/UL — HIGH (ref 3.8–10.5)
WBC # FLD AUTO: 12.13 K/UL — HIGH (ref 3.8–10.5)

## 2022-04-11 PROCEDURE — G1004: CPT

## 2022-04-11 PROCEDURE — 71045 X-RAY EXAM CHEST 1 VIEW: CPT | Mod: 26

## 2022-04-11 PROCEDURE — 93010 ELECTROCARDIOGRAM REPORT: CPT | Mod: 76

## 2022-04-11 PROCEDURE — 99285 EMERGENCY DEPT VISIT HI MDM: CPT | Mod: 25,GC

## 2022-04-11 PROCEDURE — 70498 CT ANGIOGRAPHY NECK: CPT | Mod: 26,MF

## 2022-04-11 PROCEDURE — 70496 CT ANGIOGRAPHY HEAD: CPT | Mod: 26,MF

## 2022-04-11 PROCEDURE — 76937 US GUIDE VASCULAR ACCESS: CPT | Mod: 26,GC

## 2022-04-11 PROCEDURE — 70450 CT HEAD/BRAIN W/O DYE: CPT | Mod: 26,ME,59

## 2022-04-11 PROCEDURE — 36000 PLACE NEEDLE IN VEIN: CPT | Mod: 59,GC

## 2022-04-11 PROCEDURE — 99223 1ST HOSP IP/OBS HIGH 75: CPT

## 2022-04-11 RX ORDER — LANOLIN ALCOHOL/MO/W.PET/CERES
3 CREAM (GRAM) TOPICAL AT BEDTIME
Refills: 0 | Status: DISCONTINUED | OUTPATIENT
Start: 2022-04-11 | End: 2022-04-15

## 2022-04-11 RX ORDER — LOSARTAN POTASSIUM 100 MG/1
100 TABLET, FILM COATED ORAL DAILY
Refills: 0 | Status: DISCONTINUED | OUTPATIENT
Start: 2022-04-11 | End: 2022-04-15

## 2022-04-11 RX ORDER — ONDANSETRON 8 MG/1
4 TABLET, FILM COATED ORAL EVERY 8 HOURS
Refills: 0 | Status: DISCONTINUED | OUTPATIENT
Start: 2022-04-11 | End: 2022-04-15

## 2022-04-11 RX ORDER — MONTELUKAST 4 MG/1
10 TABLET, CHEWABLE ORAL AT BEDTIME
Refills: 0 | Status: DISCONTINUED | OUTPATIENT
Start: 2022-04-11 | End: 2022-04-15

## 2022-04-11 RX ORDER — SODIUM CHLORIDE 9 MG/ML
1000 INJECTION INTRAMUSCULAR; INTRAVENOUS; SUBCUTANEOUS ONCE
Refills: 0 | Status: COMPLETED | OUTPATIENT
Start: 2022-04-11 | End: 2022-04-11

## 2022-04-11 RX ORDER — HYDRALAZINE HCL 50 MG
10 TABLET ORAL ONCE
Refills: 0 | Status: COMPLETED | OUTPATIENT
Start: 2022-04-11 | End: 2022-04-11

## 2022-04-11 RX ORDER — MAGNESIUM SULFATE 500 MG/ML
2 VIAL (ML) INJECTION ONCE
Refills: 0 | Status: COMPLETED | OUTPATIENT
Start: 2022-04-11 | End: 2022-04-11

## 2022-04-11 RX ORDER — LORATADINE 10 MG/1
10 TABLET ORAL AT BEDTIME
Refills: 0 | Status: DISCONTINUED | OUTPATIENT
Start: 2022-04-11 | End: 2022-04-15

## 2022-04-11 RX ORDER — MECLIZINE HCL 12.5 MG
25 TABLET ORAL ONCE
Refills: 0 | Status: COMPLETED | OUTPATIENT
Start: 2022-04-11 | End: 2022-04-11

## 2022-04-11 RX ORDER — CLOPIDOGREL BISULFATE 75 MG/1
75 TABLET, FILM COATED ORAL DAILY
Refills: 0 | Status: DISCONTINUED | OUTPATIENT
Start: 2022-04-11 | End: 2022-04-12

## 2022-04-11 RX ORDER — ONDANSETRON 8 MG/1
4 TABLET, FILM COATED ORAL ONCE
Refills: 0 | Status: COMPLETED | OUTPATIENT
Start: 2022-04-11 | End: 2022-04-11

## 2022-04-11 RX ORDER — ACETAMINOPHEN 500 MG
650 TABLET ORAL EVERY 6 HOURS
Refills: 0 | Status: DISCONTINUED | OUTPATIENT
Start: 2022-04-11 | End: 2022-04-15

## 2022-04-11 RX ORDER — LOSARTAN POTASSIUM 100 MG/1
1 TABLET, FILM COATED ORAL
Qty: 0 | Refills: 0 | DISCHARGE

## 2022-04-11 RX ORDER — NICARDIPINE HYDROCHLORIDE 30 MG/1
5 CAPSULE, EXTENDED RELEASE ORAL
Qty: 40 | Refills: 0 | Status: DISCONTINUED | OUTPATIENT
Start: 2022-04-11 | End: 2022-04-11

## 2022-04-11 RX ORDER — SODIUM CHLORIDE 9 MG/ML
500 INJECTION INTRAMUSCULAR; INTRAVENOUS; SUBCUTANEOUS ONCE
Refills: 0 | Status: COMPLETED | OUTPATIENT
Start: 2022-04-11 | End: 2022-04-11

## 2022-04-11 RX ORDER — METOCLOPRAMIDE HCL 10 MG
10 TABLET ORAL ONCE
Refills: 0 | Status: COMPLETED | OUTPATIENT
Start: 2022-04-11 | End: 2022-04-11

## 2022-04-11 RX ORDER — PREGABALIN 225 MG/1
1500 CAPSULE ORAL
Qty: 0 | Refills: 0 | DISCHARGE

## 2022-04-11 RX ORDER — ASPIRIN/CALCIUM CARB/MAGNESIUM 324 MG
162 TABLET ORAL DAILY
Refills: 0 | Status: DISCONTINUED | OUTPATIENT
Start: 2022-04-11 | End: 2022-04-12

## 2022-04-11 RX ORDER — PREGABALIN 225 MG/1
1000 CAPSULE ORAL DAILY
Refills: 0 | Status: DISCONTINUED | OUTPATIENT
Start: 2022-04-11 | End: 2022-04-15

## 2022-04-11 RX ORDER — ISOSORBIDE MONONITRATE 60 MG/1
30 TABLET, EXTENDED RELEASE ORAL DAILY
Refills: 0 | Status: DISCONTINUED | OUTPATIENT
Start: 2022-04-11 | End: 2022-04-12

## 2022-04-11 RX ORDER — OMEGA-3 ACID ETHYL ESTERS 1 G
2 CAPSULE ORAL
Refills: 0 | Status: DISCONTINUED | OUTPATIENT
Start: 2022-04-11 | End: 2022-04-15

## 2022-04-11 RX ORDER — LEVOTHYROXINE SODIUM 125 MCG
75 TABLET ORAL DAILY
Refills: 0 | Status: DISCONTINUED | OUTPATIENT
Start: 2022-04-11 | End: 2022-04-15

## 2022-04-11 RX ORDER — ATORVASTATIN CALCIUM 80 MG/1
20 TABLET, FILM COATED ORAL AT BEDTIME
Refills: 0 | Status: DISCONTINUED | OUTPATIENT
Start: 2022-04-11 | End: 2022-04-15

## 2022-04-11 RX ADMIN — SODIUM CHLORIDE 1000 MILLILITER(S): 9 INJECTION INTRAMUSCULAR; INTRAVENOUS; SUBCUTANEOUS at 17:16

## 2022-04-11 RX ADMIN — Medication 25 GRAM(S): at 18:23

## 2022-04-11 RX ADMIN — LORATADINE 10 MILLIGRAM(S): 10 TABLET ORAL at 23:14

## 2022-04-11 RX ADMIN — Medication 10 MILLIGRAM(S): at 16:32

## 2022-04-11 RX ADMIN — SODIUM CHLORIDE 1000 MILLILITER(S): 9 INJECTION INTRAMUSCULAR; INTRAVENOUS; SUBCUTANEOUS at 18:57

## 2022-04-11 RX ADMIN — NICARDIPINE HYDROCHLORIDE 25 MG/HR: 30 CAPSULE, EXTENDED RELEASE ORAL at 19:10

## 2022-04-11 RX ADMIN — ONDANSETRON 4 MILLIGRAM(S): 8 TABLET, FILM COATED ORAL at 19:02

## 2022-04-11 RX ADMIN — SODIUM CHLORIDE 1000 MILLILITER(S): 9 INJECTION INTRAMUSCULAR; INTRAVENOUS; SUBCUTANEOUS at 18:23

## 2022-04-11 RX ADMIN — MONTELUKAST 10 MILLIGRAM(S): 4 TABLET, CHEWABLE ORAL at 23:14

## 2022-04-11 RX ADMIN — Medication 25 MILLIGRAM(S): at 17:23

## 2022-04-11 RX ADMIN — Medication 10 MILLIGRAM(S): at 18:57

## 2022-04-11 NOTE — ED PROVIDER NOTE - CARE PLAN
Principal Discharge DX:	Hypertensive emergency  Secondary Diagnosis:	New onset atrial fibrillation   1 Principal Discharge DX:	Hypertensive urgency  Secondary Diagnosis:	New onset atrial fibrillation  Secondary Diagnosis:	Weakness with dizziness

## 2022-04-11 NOTE — ED PROVIDER NOTE - NSICDXPASTMEDICALHX_GEN_ALL_CORE_FT
PAST MEDICAL HISTORY:  C. difficile colitis     Cerebral aneurysm s/p clipping and coiling    Diverticulitis     Hyperlipidemia     Hypertension     Hypertension     Hypothyroid     Hypothyroidism     Seasonal allergies     Stroke     Vertigo

## 2022-04-11 NOTE — ED PROVIDER NOTE - ATTENDING CONTRIBUTION TO CARE
I, Krystina Cheung MD,  performed the initial face to face bedside interview with this patient regarding history of present illness, review of symptoms and relevant past medical, social and family history.  I completed an independent physical examination.  I was the initial provider who evaluated this patient. I have signed out the follow up of any pending tests (i.e. labs, radiological studies) to the resident.  I have communicated the patient’s plan of care and disposition with the resident.  The history, relevant review of systems, past medical and surgical history, medical decision making, and physical examination was documented by the scribe in my presence and I attest to the accuracy of the documentation.

## 2022-04-11 NOTE — H&P ADULT - NSHPPHYSICALEXAM_GEN_ALL_CORE
Vital Signs Last 24 Hrs  T(C): 36.7 (11 Apr 2022 19:38), Max: 36.7 (11 Apr 2022 19:38)  T(F): 98 (11 Apr 2022 19:38), Max: 98 (11 Apr 2022 19:38)  HR: 65 (11 Apr 2022 21:06) (48 - 83)  BP: 156/70 (11 Apr 2022 21:06) (136/77 - 207/74)  BP(mean): 100 (11 Apr 2022 19:23) (100 - 100)  RR: 20 (11 Apr 2022 21:06) (18 - 20)  SpO2: 97% (11 Apr 2022 21:06) (97% - 100%)

## 2022-04-11 NOTE — ED PROVIDER NOTE - NSICDXPASTSURGICALHX_GEN_ALL_CORE_FT
PAST SURGICAL HISTORY:  Cataract of both eyes     S/P appendectomy     S/P brain surgery     S/P hysterectomy     S/P tonsillectomy

## 2022-04-11 NOTE — H&P ADULT - HISTORY OF PRESENT ILLNESS
80 y/o female with PMH of cerebral aneurysm s/p coiling/clipping, migraines, HTN, HLD, hypothyroidism came to the ED complaining of nausea, vomiting and dizziness. Patient noted multiple episodes of non-bloody emesis that started this AM. She reported dizziness, 1 episode of loose BM and HA. She has no fever, chills, chest pain, shortness of breath, palpitation, blurry vision, abdominal pain, melena, hematuria, hematemesis, sick contact, recent travel.

## 2022-04-11 NOTE — CONSULT NOTE ADULT - SUBJECTIVE AND OBJECTIVE BOX
Patient is a 81y old  Female who presents with a chief complaint of     BRIEF HOSPITAL COURSE:   81F PMH cerebral aneurysm s/p coiling/clipping, migraines, HTN, HLD, hypothyroidism who presents with several hours of N/V/D. She has also been feeling weak over several days. Denies any bleeding. Her vomitus is yellow-colored. She has not stopped taking any of her home medications. She denies sick contacts or travel. She denies fevers, but does have chills. No chest pain or palpitations. In the ED she was found with SBP 210s. Was given 10mg IV hydralazine and started on Cardene gtt. She was also given Meclizine 25mg PO, MgSO4 2gm, Reglan 10mg IV x1 and Zofran 4mg IV x1.   ICU was consulted for HTN.    PAST MEDICAL & SURGICAL HISTORY:  Hypothyroid    Seasonal allergies    Stroke    Hypertension    Diverticulitis    Cerebral aneurysm  s/p clipping and coiling    Hypertension    Hyperlipidemia    Hypothyroidism    Vertigo    C. difficile colitis    S/P tonsillectomy    S/P appendectomy    S/P hysterectomy    S/P brain surgery    Cataract of both eyes      Allergies    codeine (Unknown)  codeine (Vomiting)  Darvon (Unknown)  Darvon (Vomiting)  Flagyl (Diarrhea; Faint; Nausea)  Flagyl (Unknown)  morphine (Unknown)  morphine (Vomiting)    Intolerances      FAMILY HISTORY:  No significant family history    Family history of stroke (Grandparent)        Family history otherwise noncontributory.    Social History: Non-smoker    Review of Systems:  CONSTITUTIONAL:  No fevers, +chills  HEAD: No headache  EYES: No blurry vision  ENT: No epistaxis, rhinorrhea, sore throat  CARDIOVASCULAR:  No chest pain, no palpitations  RESPIRATORY:  As per HPI  GASTROINTESTINAL:  +N/V/D  GENITOURINARY:  No dysuria, frequency or urgency  NEUROLOGIC:  No seizures or headaches  PSYCHIATRIC:  No disorder of thought or mood    ALL OTHER REVIEW OF SYSTEMS EXCEPT PER HPI NEGATIVE.      Medications:                                  ICU Vital Signs Last 24 Hrs  T(C): 36.7 (11 Apr 2022 19:38), Max: 36.7 (11 Apr 2022 19:38)  T(F): 98 (11 Apr 2022 19:38), Max: 98 (11 Apr 2022 19:38)  HR: 65 (11 Apr 2022 20:18) (48 - 83)  BP: 155/70 (11 Apr 2022 20:18) (136/77 - 207/74)  BP(mean): 100 (11 Apr 2022 19:23) (100 - 100)  ABP: --  ABP(mean): --  RR: 20 (11 Apr 2022 20:18) (18 - 20)  SpO2: 98% (11 Apr 2022 20:18) (98% - 100%)    Vital Signs Last 24 Hrs  T(C): 36.7 (11 Apr 2022 19:38), Max: 36.7 (11 Apr 2022 19:38)  T(F): 98 (11 Apr 2022 19:38), Max: 98 (11 Apr 2022 19:38)  HR: 65 (11 Apr 2022 20:18) (48 - 83)  BP: 155/70 (11 Apr 2022 20:18) (136/77 - 207/74)  BP(mean): 100 (11 Apr 2022 19:23) (100 - 100)  RR: 20 (11 Apr 2022 20:18) (18 - 20)  SpO2: 98% (11 Apr 2022 20:18) (98% - 100%)        I&O's Detail        LABS:                        12.4   12.13 )-----------( 162      ( 11 Apr 2022 17:54 )             35.7     04-11    140  |  103  |  19.0  ----------------------------<  166<H>  4.3   |  22.0  |  1.08    Ca    8.9      11 Apr 2022 18:48  Mg     1.5     04-11    TPro  6.0<L>  /  Alb  3.9  /  TBili  0.5  /  DBili  x   /  AST  16  /  ALT  15  /  AlkPhos  78  04-11      CARDIAC MARKERS ( 11 Apr 2022 17:02 )  x     / <0.01 ng/mL / x     / x     / x          CAPILLARY BLOOD GLUCOSE        PT/INR - ( 11 Apr 2022 17:54 )   PT: 11.9 sec;   INR: 1.03 ratio         PTT - ( 11 Apr 2022 17:54 )  PTT:25.2 sec    CULTURES:      Physical Examination:  GENERAL: In NAD   HEENT: NC/AT  NECK: Supple, trachea midline  PULM: CTA anteriorly  CVS: +S1, S2  ABD: Soft, mildly tender to palpation  EXTREMITIES: No pedal edema B/L  SKIN: No open wounds  NEURO: Grossly non-focal    DEVICES:     RADIOLOGY:   < from: CT Head No Cont (04.11.22 @ 16:29) >    ACC: [id number] EXAM:  CT BRAIN                          PROCEDURE DATE:  04/11/2022          INTERPRETATION:  Exam Date: 4/11/2022 4:29 PM    CT head without IV contrast    CLINICAL INFORMATION:  Headache, new or worsening    TECHNIQUE: Contiguous axial sections were obtained through the head.     Coronal and sagittal reformats were obtained.    COMPARISON: CT head 8/28/2019    FINDINGS:    Status post bifrontal craniotomies are visualized. There is   encephalomalacia in the left anterior frontal lobe, as seen on prior   exam. There is coil material in the left parasellar region and the right   parasellar region, unchanged since prior exam. There is no evidence of   intraparenchymal or extraaxial hemorrhage.   There is no CT evidence of   large vessel acute infarct. No mass effect is found in the brain.  No   evidence of midline shift or herniation pattern.    The ventricles, sulci and basal cisterns appear unremarkable.    Visualized paranasal sinuses are clear.    IMPRESSION:    No acute intracranial findings. No change since prior exam. Status post   bifrontal craniotomies are visualized. There is encephalomalacia in the   left anterior frontal lobe, as seen on prior exam. There is coil material   in the left parasellar region andthe right parasellar region, unchanged   since prior exam.    --- End of Report ---            FLORENTINO SCHOFIELD MD; Attending Radiologist  This document has been electronically signed. Apr 11 2022  4:43PM    < end of copied text >

## 2022-04-11 NOTE — ED PROVIDER NOTE - PROGRESS NOTE DETAILS
Given the significant and immediate threats to this patient based on initial presentation, the benefits of emergency contrast-enhanced CT imaging without obtaining GFR/creatinine serum level results greatly outweigh the potential risk of harm due to contrast-induced nephropathy. Kvng: Pt with persistent dizziness. BP initially improved, but now increased to 206/97. Will give hydralazine IV. Labs reviewed; daughter notes that pt has never been on any diabetes medications. Will give IV fluids and reassess. Calderon: Rpt BMP WNL; suspect initial sample was contaminated. Pt with persistent dizziness and hypertension to 190 systolic despite hydralazine; started on nicardipine with improvement. Pt also noted to be in new onset Afib. Consults placed to MICU and cardiology. Kvng: BP improved; will trial off nicardipine. MICU deferring admission to medicine. Calderon: BP remains stable at 155/70 off nicardipine. Will admit to tele.

## 2022-04-11 NOTE — CONSULT NOTE ADULT - ASSESSMENT
81F PMH cerebral aneurysm s/p coiling/clipping, migraines, HTN, HLD, hypothyroidism who presents with several hours of N/V/D, found with hypertensive urgency    Impression/Recommendations:    Hypertensive urgency  N/V/D    - Was taken off Cardene gtt  - Suggest starting Labetalol 300mg Q8H   - Goal SBP should be ~160 in the first 24 hours  - Nausea control  - Suggest checking CT abdomen given her GI symptoms  - F/u COVID PCR; check stool O+P  - Monitor mental status  - Pt at this time not requiring ICU level of care - would recommend admitting to telemetry    Discussed with ED resident Dr. Kvng Garcia M.D.  Pulmonary & Critical Care Medicine  [facility]  Pulmonary [facility] Medicine at [place]  [address]  [place], [place] [address]  T: [phone]  F: [phone]

## 2022-04-11 NOTE — ED ADULT NURSE REASSESSMENT NOTE - NS ED NURSE REASSESS COMMENT FT1
Pt resting comfortably in bed. Pt denies any needs at this time. Pt resp are even and unlabored, skin color kike for race. Pt resting on cm.

## 2022-04-11 NOTE — H&P ADULT - ASSESSMENT
Hypertensive urgency   Admit to telemetry    82 y/o female with PMH of cerebral aneurysm s/p coiling/clipping, migraines, HTN, HLD, hypothyroidism came to the ED complaining of nausea, vomiting and dizziness. Patient noted multiple episodes of non-bloody emesis that started this AM. She reported dizziness, 1 episode of loose BM and HA.   In the ED, found to have elevated BP (SBP >200), bradycardia. CT head, CT head/neck with IV: no acute finding. Started on Nicardipine for BP controlled initially, now off. MICU consulted. ECG showed afib     Hypertensive urgency   Admit to telemetry   S/p Nicardipine drip; Hydralazine   BP now controlled   Will continue home medications: Losartan 100mg   Imdur 30mg   Bystolic 5mg (will give with holding parameters; HR now in the 70s)    80 y/o female with PMH of cerebral aneurysm s/p coiling/clipping, migraines, HTN, HLD, hypothyroidism came to the ED complaining of nausea, vomiting and dizziness. Patient noted multiple episodes of non-bloody emesis that started this AM. She reported dizziness, 1 episode of loose BM and HA.   In the ED, found to have elevated BP (SBP >200), bradycardia. CT head, CT head/neck with IV: no acute finding. Started on Nicardipine for BP controlled initially, now off. MICU consulted. ECG showed afib; as per family and patient no prior of afib. Initial BMP showed hyponatremia and hyperglycemia; repeat BMP normal.     Hypertensive urgency   Admit to telemetry   S/p Nicardipine drip; Hydralazine   BP now controlled   Will continue home medications: Losartan 100mg   Imdur 30mg   Bystolic 5mg (will give with holding parameters; HR now in the 70s)   Cardiology consulted     Nausea/Vomiting   Likely due to elevated BP   Zofran PRN   Monitor electrolytes     Afib   As seen on ECG   On exam, patient in NSR   Will continue telemetry     Hypomagnesemia          80 y/o female with PMH of cerebral aneurysm s/p coiling/clipping, migraines, HTN, HLD, hypothyroidism came to the ED complaining of nausea, vomiting and dizziness. Patient noted multiple episodes of non-bloody emesis that started this AM. She reported dizziness, 1 episode of loose BM and HA.   In the ED, found to have elevated BP (SBP >200), bradycardia. CT head, CT head/neck with IV: no acute finding. Started on Nicardipine for BP controlled initially, now off. MICU consulted. ECG showed afib; as per family and patient no prior of afib. Initial BMP showed hyponatremia and hyperglycemia; repeat BMP normal. Patient has no BM in the ED.     Hypertensive urgency   Admit to telemetry   S/p Nicardipine drip; Hydralazine   BP now controlled   Will continue home medications: Losartan 100mg   Imdur 30mg   Bystolic 5mg (will give with holding parameters; HR now in the 70s)   Cardiology consulted     Nausea/Vomiting   Likely due to elevated BP   Zofran PRN   Monitor electrolytes     Afib   As seen on ECG   On exam, patient in NSR   Will continue telemetry     Hypomagnesemia   M.5  Supplemented   Monitor Mg     Leukocytosis   WBC: 12.13 with left shift   No clear sign of infection, patient not toxic looking   Will hold off on antibiotic, likely reactive   CBC with AM     Pre-DM  On herbal medication   HbA1C with AM lab   Insulin sliding scale     Hypothyroidism   Synthroid 75mcg     HLD/CAD   S/p stent   Plavix 75mg   Aspirin 162mg   Atorvastatin 20mg     Seasonal allergies   Singulair 10mg HS   Levocetirizine 5mg HS     Supportive   DVT prophylaxis: Heparin sc   Diet: DASH     Plan of care discussed with patient and daughter (Anjali) at bed side

## 2022-04-11 NOTE — ED ADULT NURSE NOTE - NSICDXFAMILYHX_GEN_ALL_CORE_FT
FAMILY HISTORY:  No significant family history    Grandparent  Still living? Unknown  Family history of stroke, Age at diagnosis: Age Unknown

## 2022-04-11 NOTE — ED PROVIDER NOTE - NS ED ROS FT
Constitutional: no fever, no chills  Eyes: no vision changes  ENT: no nasal congestion, no sore throat  CV: no chest pain  Resp: no cough, no shortness of breath  GI: no abdominal pain, +vomiting, no diarrhea  : no dysuria  MSK: no joint pain  Skin: no rash  Neuro: +headache, no focal weakness, no paresthesias

## 2022-04-11 NOTE — ED PROVIDER NOTE - CLINICAL SUMMARY MEDICAL DECISION MAKING FREE TEXT BOX
81y F w/ hx brain aneurysms s/p clipping/coiling, HTN, HLD; presenting for dizziness and vomiting. Pt hypertensive and bradycardic on arrival. STAT CT head and CTA head/neck obtained. Will check EKG, CXR, labs. Treat with fluids, reglan, meclizine, reassess. 81y F w/ hx brain aneurysms s/p clipping/coiling, HTN, HLD; presenting for dizziness and vomiting. Pt hypertensive and bradycardic on arrival. STAT CT head and CTA head/neck obtained. Will check EKG, CXR, labs. Treat with fluids, reglan, meclizine, reassess.    ROMAINE Cheung MD   patient with new onset afib will admit to telemetry, off nicardipine drip

## 2022-04-11 NOTE — ED PROVIDER NOTE - PHYSICAL EXAMINATION
Constitutional: Awake, alert, ill-appearing, in moderate distress  Eyes: no scleral icterus, PERRL, EOMI  HENT: normocephalic, atraumatic, +dry oral mucosa  Neck: supple  CV: RRR, no murmur, 2+ distal pulses in all extremities  Pulm: non-labored respirations, CTAB  Abdomen: soft, non-tender, non-distended  Extremities: no edema, no deformity  Skin: no rash, no jaundice  Neuro: AAOx3, moving all extremities equally, no focal deficits

## 2022-04-11 NOTE — ED PROVIDER NOTE - OBJECTIVE STATEMENT
81y F w/ hx 2 brain aneurysms s/p clipping/coiling, HTN, HLD, migraines, hypothyroid, borderline DM, presenting for vomiting. Pt reports that she has been feeling generally weak over the past week. This morning, developed a typical migraine headache, and afterwards began feeling dizzy and nauseated with vomiting. Dizziness described as room-spinning sensation. States that she has had similar episodes in the past, but never this severe. No fever, cough, congestion, chest pain, shortness of breath. Pt noted to be hypertensive and bradycardic in the field, and required immediate medical attention on arrival. 81y F w/ hx 2 brain aneurysms s/p clipping/coiling, HTN, HLD, migraines, hypothyroid, borderline DM, presenting for vomiting. Pt reports that she has been feeling generally weak over the past week. This morning, developed a typical migraine headache, and afterwards began feeling dizzy and nauseated with vomiting. Dizziness described as room-spinning sensation. States that she has had similar episodes in the past, but never this severe. No fever, cough, congestion, chest pain, shortness of breath. Pt noted to be hypertensive and bradycardic in the field. Given zofran IV prior to arrival. Pt required immediate medical attention on arrival.

## 2022-04-11 NOTE — ED ADULT NURSE NOTE - OBJECTIVE STATEMENT
Pt is alert and oriented. Pt states that she has been feeling weak for over 1 week. Pt states that she developed a migraine today and then was found by her daughter vomiting. Pt states that she was dizzy and is still dizzy. Pt states that she had diarrhea today. Pt denies sob, blurry vision, chest pain, nausea, vomiting, and pain. Pt resp are even and unlabored, skin color kike for race. Pt updated on plan of care.

## 2022-04-12 DIAGNOSIS — K62.5 HEMORRHAGE OF ANUS AND RECTUM: ICD-10-CM

## 2022-04-12 DIAGNOSIS — I48.91 UNSPECIFIED ATRIAL FIBRILLATION: ICD-10-CM

## 2022-04-12 DIAGNOSIS — I25.10 ATHEROSCLEROTIC HEART DISEASE OF NATIVE CORONARY ARTERY WITHOUT ANGINA PECTORIS: ICD-10-CM

## 2022-04-12 LAB
A1C WITH ESTIMATED AVERAGE GLUCOSE RESULT: 6.8 % — HIGH (ref 4–5.6)
ANION GAP SERPL CALC-SCNC: 14 MMOL/L — SIGNIFICANT CHANGE UP (ref 5–17)
BUN SERPL-MCNC: 18.7 MG/DL — SIGNIFICANT CHANGE UP (ref 8–20)
CALCIUM SERPL-MCNC: 8.8 MG/DL — SIGNIFICANT CHANGE UP (ref 8.6–10.2)
CHLORIDE SERPL-SCNC: 107 MMOL/L — SIGNIFICANT CHANGE UP (ref 98–107)
CO2 SERPL-SCNC: 20 MMOL/L — LOW (ref 22–29)
CREAT SERPL-MCNC: 1.11 MG/DL — SIGNIFICANT CHANGE UP (ref 0.5–1.3)
EGFR: 50 ML/MIN/1.73M2 — LOW
ESTIMATED AVERAGE GLUCOSE: 148 MG/DL — HIGH (ref 68–114)
GLUCOSE BLDC GLUCOMTR-MCNC: 130 MG/DL — HIGH (ref 70–99)
GLUCOSE BLDC GLUCOMTR-MCNC: 138 MG/DL — HIGH (ref 70–99)
GLUCOSE BLDC GLUCOMTR-MCNC: 143 MG/DL — HIGH (ref 70–99)
GLUCOSE BLDC GLUCOMTR-MCNC: 166 MG/DL — HIGH (ref 70–99)
GLUCOSE SERPL-MCNC: 135 MG/DL — HIGH (ref 70–99)
HCT VFR BLD CALC: 31.5 % — LOW (ref 34.5–45)
HGB BLD-MCNC: 10.9 G/DL — LOW (ref 11.5–15.5)
MAGNESIUM SERPL-MCNC: 2.4 MG/DL — SIGNIFICANT CHANGE UP (ref 1.6–2.6)
MCHC RBC-ENTMCNC: 30.6 PG — SIGNIFICANT CHANGE UP (ref 27–34)
MCHC RBC-ENTMCNC: 34.6 GM/DL — SIGNIFICANT CHANGE UP (ref 32–36)
MCV RBC AUTO: 88.5 FL — SIGNIFICANT CHANGE UP (ref 80–100)
PLATELET # BLD AUTO: 183 K/UL — SIGNIFICANT CHANGE UP (ref 150–400)
POTASSIUM SERPL-MCNC: 4.3 MMOL/L — SIGNIFICANT CHANGE UP (ref 3.5–5.3)
POTASSIUM SERPL-SCNC: 4.3 MMOL/L — SIGNIFICANT CHANGE UP (ref 3.5–5.3)
RBC # BLD: 3.56 M/UL — LOW (ref 3.8–5.2)
RBC # FLD: 12.4 % — SIGNIFICANT CHANGE UP (ref 10.3–14.5)
RSV RNA NPH QL NAA+NON-PROBE: SIGNIFICANT CHANGE UP
SODIUM SERPL-SCNC: 141 MMOL/L — SIGNIFICANT CHANGE UP (ref 135–145)
WBC # BLD: 9.37 K/UL — SIGNIFICANT CHANGE UP (ref 3.8–10.5)
WBC # FLD AUTO: 9.37 K/UL — SIGNIFICANT CHANGE UP (ref 3.8–10.5)

## 2022-04-12 PROCEDURE — 99233 SBSQ HOSP IP/OBS HIGH 50: CPT

## 2022-04-12 RX ORDER — POLYETHYLENE GLYCOL 3350 17 G/17G
17 POWDER, FOR SOLUTION ORAL ONCE
Refills: 0 | Status: COMPLETED | OUTPATIENT
Start: 2022-04-12 | End: 2022-04-12

## 2022-04-12 RX ORDER — INSULIN LISPRO 100/ML
VIAL (ML) SUBCUTANEOUS
Refills: 0 | Status: DISCONTINUED | OUTPATIENT
Start: 2022-04-12 | End: 2022-04-15

## 2022-04-12 RX ORDER — ISOSORBIDE MONONITRATE 60 MG/1
60 TABLET, EXTENDED RELEASE ORAL DAILY
Refills: 0 | Status: DISCONTINUED | OUTPATIENT
Start: 2022-04-12 | End: 2022-04-13

## 2022-04-12 RX ORDER — HEPARIN SODIUM 5000 [USP'U]/ML
5000 INJECTION INTRAVENOUS; SUBCUTANEOUS EVERY 8 HOURS
Refills: 0 | Status: DISCONTINUED | OUTPATIENT
Start: 2022-04-12 | End: 2022-04-12

## 2022-04-12 RX ORDER — DEXTROSE 50 % IN WATER 50 %
15 SYRINGE (ML) INTRAVENOUS ONCE
Refills: 0 | Status: DISCONTINUED | OUTPATIENT
Start: 2022-04-12 | End: 2022-04-15

## 2022-04-12 RX ORDER — HYDRALAZINE HCL 50 MG
10 TABLET ORAL EVERY 6 HOURS
Refills: 0 | Status: DISCONTINUED | OUTPATIENT
Start: 2022-04-12 | End: 2022-04-15

## 2022-04-12 RX ORDER — NEBIVOLOL HYDROCHLORIDE 5 MG/1
5 TABLET ORAL DAILY
Refills: 0 | Status: DISCONTINUED | OUTPATIENT
Start: 2022-04-12 | End: 2022-04-15

## 2022-04-12 RX ORDER — ENOXAPARIN SODIUM 100 MG/ML
80 INJECTION SUBCUTANEOUS EVERY 12 HOURS
Refills: 0 | Status: DISCONTINUED | OUTPATIENT
Start: 2022-04-12 | End: 2022-04-14

## 2022-04-12 RX ORDER — DEXTROSE 50 % IN WATER 50 %
25 SYRINGE (ML) INTRAVENOUS ONCE
Refills: 0 | Status: DISCONTINUED | OUTPATIENT
Start: 2022-04-12 | End: 2022-04-15

## 2022-04-12 RX ORDER — DEXTROSE 50 % IN WATER 50 %
12.5 SYRINGE (ML) INTRAVENOUS ONCE
Refills: 0 | Status: DISCONTINUED | OUTPATIENT
Start: 2022-04-12 | End: 2022-04-15

## 2022-04-12 RX ORDER — GLUCAGON INJECTION, SOLUTION 0.5 MG/.1ML
1 INJECTION, SOLUTION SUBCUTANEOUS ONCE
Refills: 0 | Status: DISCONTINUED | OUTPATIENT
Start: 2022-04-12 | End: 2022-04-15

## 2022-04-12 RX ORDER — SODIUM CHLORIDE 9 MG/ML
1000 INJECTION, SOLUTION INTRAVENOUS
Refills: 0 | Status: DISCONTINUED | OUTPATIENT
Start: 2022-04-12 | End: 2022-04-15

## 2022-04-12 RX ORDER — INSULIN LISPRO 100/ML
VIAL (ML) SUBCUTANEOUS AT BEDTIME
Refills: 0 | Status: DISCONTINUED | OUTPATIENT
Start: 2022-04-12 | End: 2022-04-15

## 2022-04-12 RX ADMIN — MONTELUKAST 10 MILLIGRAM(S): 4 TABLET, CHEWABLE ORAL at 21:21

## 2022-04-12 RX ADMIN — Medication 1 TABLET(S): at 21:21

## 2022-04-12 RX ADMIN — Medication 162 MILLIGRAM(S): at 12:56

## 2022-04-12 RX ADMIN — ISOSORBIDE MONONITRATE 30 MILLIGRAM(S): 60 TABLET, EXTENDED RELEASE ORAL at 12:56

## 2022-04-12 RX ADMIN — Medication 1 TABLET(S): at 12:56

## 2022-04-12 RX ADMIN — Medication 3 MILLIGRAM(S): at 21:21

## 2022-04-12 RX ADMIN — ENOXAPARIN SODIUM 80 MILLIGRAM(S): 100 INJECTION SUBCUTANEOUS at 19:01

## 2022-04-12 RX ADMIN — Medication 1 TABLET(S): at 05:50

## 2022-04-12 RX ADMIN — ISOSORBIDE MONONITRATE 60 MILLIGRAM(S): 60 TABLET, EXTENDED RELEASE ORAL at 14:22

## 2022-04-12 RX ADMIN — HEPARIN SODIUM 5000 UNIT(S): 5000 INJECTION INTRAVENOUS; SUBCUTANEOUS at 05:49

## 2022-04-12 RX ADMIN — Medication 2 GRAM(S): at 21:21

## 2022-04-12 RX ADMIN — Medication 75 MICROGRAM(S): at 05:50

## 2022-04-12 RX ADMIN — LOSARTAN POTASSIUM 100 MILLIGRAM(S): 100 TABLET, FILM COATED ORAL at 05:50

## 2022-04-12 RX ADMIN — LORATADINE 10 MILLIGRAM(S): 10 TABLET ORAL at 23:07

## 2022-04-12 RX ADMIN — ATORVASTATIN CALCIUM 20 MILLIGRAM(S): 80 TABLET, FILM COATED ORAL at 21:21

## 2022-04-12 RX ADMIN — PREGABALIN 1000 MICROGRAM(S): 225 CAPSULE ORAL at 12:58

## 2022-04-12 RX ADMIN — Medication 2 GRAM(S): at 05:49

## 2022-04-12 RX ADMIN — NEBIVOLOL HYDROCHLORIDE 5 MILLIGRAM(S): 5 TABLET ORAL at 05:50

## 2022-04-12 NOTE — CONSULT NOTE ADULT - PROBLEM SELECTOR RECOMMENDATION 9
paroxysmal   currently in sinus   continue BB   start NOAC for stroke ppx  pending echocardiogram paroxysmal   currently in sinus   continue BB   Patient reports occasional rectal bleed, will defer AC for now until clear by GI.   pending echocardiogram

## 2022-04-12 NOTE — CONSULT NOTE ADULT - NS ATTEND AMEND GEN_ALL_CORE FT
Patient seen and examined.  The patient with intermittent rectal bleeding on aspirin and Plavix.  No signs of active GI bleeding.  Last colonoscopy was more than 4 to 5 years ago.  At this time cardiology team does not want her to be on aspirin and Plavix as she had old stents.  She will be started on Lovenox and then CBC can be monitored.  If there is any drop in the blood count, we will consider colonoscopy and endoscopy.  Otherwise outpatient follow-up.

## 2022-04-12 NOTE — CONSULT NOTE ADULT - SUBJECTIVE AND OBJECTIVE BOX
CHIEF COMPLAINT: nausea /vomiting     HPI: 81F with hx of CAD (CARLOS pRCA), HTN, HLD, cerebral aneurysm s/p coiling/clipping, migraines, hypothyroidism came to the ED complaining of nausea, vomiting and dizziness. Patient noted multiple episodes of non-bloody emesis, In the ED patient noted to have uncontrolled BP, telemetry with evidence of new onset afib.   Patient endorses palpitation for the past few months  Denies chest pain, no CISNEROS, no leg edema, no orthopnea, no claudication no syncopal episodes.     PAST MEDICAL & SURGICAL HISTORY:  Hypothyroid    Seasonal allergies    Stroke    Hypertension    Diverticulitis    Cerebral aneurysm  s/p clipping and coiling    Hypertension    Hyperlipidemia    Hypothyroidism    Vertigo    C. difficile colitis    S/P tonsillectomy    S/P appendectomy    S/P hysterectomy    S/P brain surgery    Cataract of both eyes        Allergies    codeine (Unknown)  codeine (Vomiting)  Darvon (Unknown)  Darvon (Vomiting)  Flagyl (Diarrhea; Faint; Nausea)  Flagyl (Unknown)  morphine (Unknown)  morphine (Vomiting)    Intolerances        MEDICATIONS  (STANDING):  aspirin enteric coated 162 milliGRAM(s) Oral daily  atorvastatin 20 milliGRAM(s) Oral at bedtime  calcium carbonate 1250 mG  + Vitamin D (OsCal 500 + D) 1 Tablet(s) Oral two times a day  clopidogrel Tablet 75 milliGRAM(s) Oral daily  cyanocobalamin 1000 MICROGram(s) Oral daily  dextrose 5%. 1000 milliLiter(s) (50 mL/Hr) IV Continuous <Continuous>  dextrose 5%. 1000 milliLiter(s) (100 mL/Hr) IV Continuous <Continuous>  dextrose 50% Injectable 25 Gram(s) IV Push once  dextrose 50% Injectable 12.5 Gram(s) IV Push once  dextrose 50% Injectable 25 Gram(s) IV Push once  glucagon  Injectable 1 milliGRAM(s) IntraMuscular once  heparin   Injectable 5000 Unit(s) SubCutaneous every 8 hours  insulin lispro (ADMELOG) corrective regimen sliding scale   SubCutaneous three times a day before meals  insulin lispro (ADMELOG) corrective regimen sliding scale   SubCutaneous at bedtime  isosorbide   mononitrate ER Tablet (IMDUR) 30 milliGRAM(s) Oral daily  levothyroxine 75 MICROGram(s) Oral daily  loratadine 10 milliGRAM(s) Oral at bedtime  losartan 100 milliGRAM(s) Oral daily  montelukast 10 milliGRAM(s) Oral at bedtime  multivitamin 1 Tablet(s) Oral daily  nebivolol 5 milliGRAM(s) Oral daily  omega-3-Acid Ethyl Esters 2 Gram(s) Oral two times a day    MEDICATIONS  (PRN):  acetaminophen     Tablet .. 650 milliGRAM(s) Oral every 6 hours PRN Temp greater or equal to 38C (100.4F), Mild Pain (1 - 3)  aluminum hydroxide/magnesium hydroxide/simethicone Suspension 30 milliLiter(s) Oral every 4 hours PRN Dyspepsia  dextrose Oral Gel 15 Gram(s) Oral once PRN Blood Glucose LESS THAN 70 milliGRAM(s)/deciliter  melatonin 3 milliGRAM(s) Oral at bedtime PRN Insomnia  ondansetron Injectable 4 milliGRAM(s) IV Push every 8 hours PRN Nausea and/or Vomiting      FAMILY HISTORY:  Family history of stroke (Grandparent)    No family history of premature coronary artery disease or sudden cardiac death    SOCIAL HISTORY:  Smoking-  Alcohol-  Illicit Drug use-    REVIEW OF SYSTEMS:  Constitutional: [ ] fever, [ ]weight loss,  [ ]fatigue  Eyes: [ ] visual changes  Respiratory: [ ]shortness of breath;  [ ] cough, [ ]wheezing, [ ]chills, [ ]hemoptysis  Cardiovascular: [ ] chest pain, [x ]palpitations, [ ]dizziness,  [ ]leg swelling [ ]syncope  Gastrointestinal: [ ] abdominal pain, [ ]nausea, [ ]vomiting,  [ ]diarrhea   Genitourinary: [ ] dysuria, [ ] hematuria  Neurologic: [x ] headaches [ ] tremors  [ ] weakness [ ] lightheadedness  Skin: [ ] itching, [ ]burning, [ ] rashes  Endocrine: [ ] heat or cold intolerance  Musculoskeletal: [ ] joint pain or swelling; [ ] muscle, back, or extremity pain  Psychiatric: [ ] depression, [ ]anxiety, [ ]mood swings, or [ ]difficulty sleeping  Hematologic: [ ] easy bruising, [ ] bleeding gums     [ x] Positive	  [ ] negative    Vital Signs Last 24 Hrs  T(C): 36.6 (12 Apr 2022 05:47), Max: 36.9 (12 Apr 2022 00:07)  T(F): 97.9 (12 Apr 2022 05:47), Max: 98.4 (12 Apr 2022 00:07)  HR: 75 (12 Apr 2022 05:47) (48 - 83)  BP: 150/65 (12 Apr 2022 05:47) (134/57 - 207/74)  BP(mean): 100 (11 Apr 2022 19:23) (100 - 100)  RR: 18 (12 Apr 2022 05:47) (18 - 20)  SpO2: 98% (12 Apr 2022 05:47) (95% - 100%)  I&O's Summary      PHYSICAL EXAM:  General: No acute distress  HEENT: EOMI  Neck:  No JVD  Lungs: Clear to auscultation bilaterally; No rales or wheezing  Heart: Regular rate and rhythm; No murmurs, rubs, or gallops  Abdomen: soft, non tender, non distended   Extremities: warm, no edema   Nervous system:  Alert & Oriented X3  Psychiatric: Normal affect  Skin: No rashes or lesions    LABS:  04-12    141  |  107  |  18.7  ----------------------------<  135<H>  4.3   |  20.0<L>  |  1.11    Ca    8.8      12 Apr 2022 03:45  Mg     2.4     04-12    TPro  6.0<L>  /  Alb  3.9  /  TBili  0.5  /  DBili  x   /  AST  16  /  ALT  15  /  AlkPhos  78  04-11    Creatinine Trend: 1.11<--, 1.08<--, 1.02<--                        10.9   9.37  )-----------( 183      ( 12 Apr 2022 03:45 )             31.5     PT/INR - ( 11 Apr 2022 17:54 )   PT: 11.9 sec;   INR: 1.03 ratio         PTT - ( 11 Apr 2022 17:54 )  PTT:25.2 sec    Lipid Panel:   Cardiac Enzymes: CARDIAC MARKERS ( 11 Apr 2022 17:02 )  x     / <0.01 ng/mL / x     / x     / x                RADIOLOGY:    ECG aFIB/ LVH    TELEMETRY:  currently in sinus   ECHO:    STRESS TEST:    CATHETERIZATION:    04/13/2017  LM:   --  LM: The vessel was large sized. Angiography showed no evidence of  disease.  LAD:   --  LAD: The vessel was large sized. Angiography showed minor  luminal irregularities with no flow limiting lesions.  CX:   --  Circumflex: The vessel was medium sized. Angiography showed no  evidence of disease.  RCA:   --  Proximal RCA: There was a tubular 75 % stenosis in the proximal  third of the vessel segment s/p CARLOS

## 2022-04-12 NOTE — CONSULT NOTE ADULT - SUBJECTIVE AND OBJECTIVE BOX
Patient is a 81y old  Female who presents with a chief complaint of Hypertensive urgency (12 Apr 2022 10:59)      HPI: This is an 81 year old woman with a significant past medical history of Cerebral aneurysm, s/p coiling/clipping, migraines, HTN, HLD, hypothyroidism, who arrived to [facility] on 09/11 complaining of non-bloody vomiting, and dizziness. Also reported palpitations for the past few months. Upon arrival patient noted to have uncontrolled BP with evidence of new onset A-Fib. Today patient being evaluated by GI for intermittent rectal bleed, patient verbalizes "blood on toilet paper after BM's". MELVIN revealing external/internal hemorrhoids, last benign colonoscopy 5 years ago. Today's hemoglobin downtrend from 12 to 10      PAST MEDICAL & SURGICAL HISTORY:  Hypothyroid    Seasonal allergies    Stroke    Hypertension    Diverticulitis    Cerebral aneurysm  s/p clipping and coiling    Hypertension    Hyperlipidemia    Hypothyroidism    Vertigo    C. difficile colitis    S/P tonsillectomy    S/P appendectomy    S/P hysterectomy    S/P brain surgery    Cataract of both eyes    Allergies    codeine (Unknown)  codeine (Vomiting)  Darvon (Unknown)  Darvon (Vomiting)  Flagyl (Diarrhea; Faint; Nausea)  Flagyl (Unknown)  morphine (Unknown)  morphine (Vomiting)    Intolerances        MEDICATIONS  (STANDING):  aspirin enteric coated 162 milliGRAM(s) Oral daily  atorvastatin 20 milliGRAM(s) Oral at bedtime  calcium carbonate 1250 mG  + Vitamin D (OsCal 500 + D) 1 Tablet(s) Oral two times a day  clopidogrel Tablet 75 milliGRAM(s) Oral daily  cyanocobalamin 1000 MICROGram(s) Oral daily  dextrose 5%. 1000 milliLiter(s) (50 mL/Hr) IV Continuous <Continuous>  dextrose 5%. 1000 milliLiter(s) (100 mL/Hr) IV Continuous <Continuous>  dextrose 50% Injectable 25 Gram(s) IV Push once  dextrose 50% Injectable 12.5 Gram(s) IV Push once  dextrose 50% Injectable 25 Gram(s) IV Push once  glucagon  Injectable 1 milliGRAM(s) IntraMuscular once  heparin   Injectable 5000 Unit(s) SubCutaneous every 8 hours  insulin lispro (ADMELOG) corrective regimen sliding scale   SubCutaneous three times a day before meals  insulin lispro (ADMELOG) corrective regimen sliding scale   SubCutaneous at bedtime  isosorbide   mononitrate ER Tablet (IMDUR) 30 milliGRAM(s) Oral daily  levothyroxine 75 MICROGram(s) Oral daily  loratadine 10 milliGRAM(s) Oral at bedtime  losartan 100 milliGRAM(s) Oral daily  montelukast 10 milliGRAM(s) Oral at bedtime  multivitamin 1 Tablet(s) Oral daily  nebivolol 5 milliGRAM(s) Oral daily  omega-3-Acid Ethyl Esters 2 Gram(s) Oral two times a day    MEDICATIONS  (PRN):  acetaminophen     Tablet .. 650 milliGRAM(s) Oral every 6 hours PRN Temp greater or equal to 38C (100.4F), Mild Pain (1 - 3)  aluminum hydroxide/magnesium hydroxide/simethicone Suspension 30 milliLiter(s) Oral every 4 hours PRN Dyspepsia  dextrose Oral Gel 15 Gram(s) Oral once PRN Blood Glucose LESS THAN 70 milliGRAM(s)/deciliter  melatonin 3 milliGRAM(s) Oral at bedtime PRN Insomnia  ondansetron Injectable 4 milliGRAM(s) IV Push every 8 hours PRN Nausea and/or Vomiting      Social History:    Marital Status:  (   )    (   ) Single    (   )    (  )   Occupation:   Lives with: (  ) alone  (  ) children   (  ) spouse   (  ) parents  (  ) other    Substance Use (street drugs): (  ) never used  (  ) other:  Tobacco Usage:  (   ) never smoked   (   ) former smoker   (   ) current smoker  (     ) pack year  (        ) last cigarette date  Alcohol Usage:  Sexual History:     Family History   IBD (  ) Yes   (  ) No  GI Malignancy (  )  Yes    (  ) No    Health Management     Last Colonoscopy - 5  years      (     ) Advanced Directives: (     ) None    (      ) DNR    (     ) DNI    (     ) Health Care Proxy:     Review of Systems:  · ENMT: negative  · Respiratory and Thorax: negative  · Cardiovascular: negative  · Gastrointestinal: see above.  · Genitourinary:	negative  · Musculoskeletal: negative  · Neurological: negative  · Psychiatric: negative  · Hematology/Lymphatics: negative  · Endocrine: negative      Vital Signs Last 24 Hrs  T(C): 36.8 (12 Apr 2022 11:05), Max: 36.9 (12 Apr 2022 00:07)  T(F): 98.2 (12 Apr 2022 11:05), Max: 98.4 (12 Apr 2022 00:07)  HR: 73 (12 Apr 2022 11:05) (48 - 83)  BP: 177/66 (12 Apr 2022 11:05) (134/57 - 207/74)  BP(mean): 100 (11 Apr 2022 19:23) (100 - 100)  RR: 18 (12 Apr 2022 11:05) (18 - 20)  SpO2: 96% (12 Apr 2022 11:05) (95% - 100%)    PHYSICAL EXAM:  · Constitutional: Elderly looking woman, in no acute distress.   · Eyes: EOMI; PERRL; no drainage or redness  · ENMT: No oral lesions; no gross abnormalities  · Neck:	No bruits; no thyromegaly or nodules  · Back:	No deformity or limitation of movement  · Respiratory: Breath Sounds equal & clear to percussion & auscultation, no accessory muscle use  · Cardiovascular: Regular rate & rhythm, normal S1, S2; no murmurs, gallops or rubs; no S3, S4  · Gastrointestinal: Soft, non-tender, no hepatosplenomegaly, normal bowel sounds  . MELVIN: External/internal hemorrhoids, brown stool on glove noted.       LABS:                        10.9   9.37  )-----------( 183      ( 12 Apr 2022 03:45 )             31.5     04-12    141  |  107  |  18.7  ----------------------------<  135<H>  4.3   |  20.0<L>  |  1.11    Ca    8.8      12 Apr 2022 03:45  Mg     2.4     04-12    TPro  6.0<L>  /  Alb  3.9  /  TBili  0.5  /  DBili  x   /  AST  16  /  ALT  15  /  AlkPhos  78  04-11    PT/INR - ( 11 Apr 2022 17:54 )   PT: 11.9 sec;   INR: 1.03 ratio         PTT - ( 11 Apr 2022 17:54 )  PTT:25.2 sec    LIVER FUNCTIONS - ( 11 Apr 2022 17:01 )  Alb: 3.9 g/dL / Pro: 6.0 g/dL / ALK PHOS: 78 U/L / ALT: 15 U/L / AST: 16 U/L / GGT: x             RADIOLOGY & ADDITIONAL TESTS:    INTERPRETATION:  Exam Date: 4/11/2022 5:01 PM    1. CT Angiography of the carotid arteries with  IV contrast  2. CT Angiography of the intracranial circulation with  IV contrast    CLINICAL INFORMATION: Headache, new or worsening    TECHNIQUE:    CT angiography images were acquired from the aortic arch to   the vertex of the skull.   Images were acquired during rapid bolus   intravenous administration of 90 cc of Omnipaque 350. 10 cc was   discarded. 3-D reformats were obtained. Coronal and sagittal reformats   were obtained.    FINDINGS: Comparison is made to prior CTAbrain of 9/21/2016.    The left common carotid artery is unremarkable. There is calcification of   the left carotid bulb. Left external carotid artery is unremarkable. 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 There is   extensiveembolic coil material throughout the cervical petrous cavernous   and supraclinoid portions of the left internal carotid artery. The left   middle cerebral artery and branches are patent secondary to contralateral   cross-filling of flow. Left anterior cerebral artery and branches appear   unremarkable.    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 No new aneurysms are identified.    Rightcommon carotid artery, right carotid bulb, right external carotid   artery, and right cervical internal carotid arteries appear unremarkable.   There is coil material within the distal cavernous segment of the right   internal carotid artery. The right middle cerebral artery and right   anterior cerebral artery and branches are patent without hemodynamically   significant stenosis.    Cervical vertebral arteries and intradural vertebral arteries appear   unremarkable. The basilar artery and posterior cerebral arteries appear   unremarkable.    IMPRESSION:    No significant interval change since prior CTA of 9/21/2016. No new   hemodynamically significant stenosis.    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 No new aneurysms are identified.    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 There is   extensive embolic coil material throughout the cervical petrous cavernous   and supraclinoid portions of the left internal carotid artery.    There is coil material within the distal cavernous segment of the right   internal carotid artery.    < end of copied text >       Patient is a 81y old  Female who presents with a chief complaint of Hypertensive urgency (12 Apr 2022 10:59)      HPI: This is an 81 year old woman with a significant past medical history of Cerebral aneurysm, s/p coiling/clipping, migraines, HTN, HLD, hypothyroidism, who arrived to [facility] on 09/11 complaining of non-bloody vomiting, and dizziness. Also reported palpitations for the past few months. Upon arrival patient noted to have uncontrolled BP with evidence of new onset A-Fib. Today patient being evaluated by GI for intermittent rectal bleed, patient verbalizes "blood on toilet paper after BM's". MELVIN revealing external/internal hemorrhoids, last benign colonoscopy 5 years ago. Today's hemoglobin downtrend from 12 to 10. Denies nausea, vomiting, abdominal pain, chest pain, shortness of breath, hematemesis, hematochezia, melena.    PAST MEDICAL & SURGICAL HISTORY:  Hypothyroid    Seasonal allergies    Stroke    Hypertension    Diverticulitis    Cerebral aneurysm  s/p clipping and coiling    Hypertension    Hyperlipidemia    Hypothyroidism    Vertigo    C. difficile colitis    S/P tonsillectomy    S/P appendectomy    S/P hysterectomy    S/P brain surgery    Cataract of both eyes    Allergies    codeine (Unknown)  codeine (Vomiting)  Darvon (Unknown)  Darvon (Vomiting)  Flagyl (Diarrhea; Faint; Nausea)  Flagyl (Unknown)  morphine (Unknown)  morphine (Vomiting)    Intolerances        MEDICATIONS  (STANDING):  aspirin enteric coated 162 milliGRAM(s) Oral daily  atorvastatin 20 milliGRAM(s) Oral at bedtime  calcium carbonate 1250 mG  + Vitamin D (OsCal 500 + D) 1 Tablet(s) Oral two times a day  clopidogrel Tablet 75 milliGRAM(s) Oral daily  cyanocobalamin 1000 MICROGram(s) Oral daily  dextrose 5%. 1000 milliLiter(s) (50 mL/Hr) IV Continuous <Continuous>  dextrose 5%. 1000 milliLiter(s) (100 mL/Hr) IV Continuous <Continuous>  dextrose 50% Injectable 25 Gram(s) IV Push once  dextrose 50% Injectable 12.5 Gram(s) IV Push once  dextrose 50% Injectable 25 Gram(s) IV Push once  glucagon  Injectable 1 milliGRAM(s) IntraMuscular once  heparin   Injectable 5000 Unit(s) SubCutaneous every 8 hours  insulin lispro (ADMELOG) corrective regimen sliding scale   SubCutaneous three times a day before meals  insulin lispro (ADMELOG) corrective regimen sliding scale   SubCutaneous at bedtime  isosorbide   mononitrate ER Tablet (IMDUR) 30 milliGRAM(s) Oral daily  levothyroxine 75 MICROGram(s) Oral daily  loratadine 10 milliGRAM(s) Oral at bedtime  losartan 100 milliGRAM(s) Oral daily  montelukast 10 milliGRAM(s) Oral at bedtime  multivitamin 1 Tablet(s) Oral daily  nebivolol 5 milliGRAM(s) Oral daily  omega-3-Acid Ethyl Esters 2 Gram(s) Oral two times a day    MEDICATIONS  (PRN):  acetaminophen     Tablet .. 650 milliGRAM(s) Oral every 6 hours PRN Temp greater or equal to 38C (100.4F), Mild Pain (1 - 3)  aluminum hydroxide/magnesium hydroxide/simethicone Suspension 30 milliLiter(s) Oral every 4 hours PRN Dyspepsia  dextrose Oral Gel 15 Gram(s) Oral once PRN Blood Glucose LESS THAN 70 milliGRAM(s)/deciliter  melatonin 3 milliGRAM(s) Oral at bedtime PRN Insomnia  ondansetron Injectable 4 milliGRAM(s) IV Push every 8 hours PRN Nausea and/or Vomiting      Social History:    Marital Status:  (   )    (   ) Single    (   )    (  )   Occupation:   Lives with: (  ) alone  (  ) children   (  ) spouse   (  ) parents  (  ) other    Substance Use (street drugs): (  ) never used  (  ) other:  Tobacco Usage:  (   ) never smoked   (   ) former smoker   (   ) current smoker  (     ) pack year  (        ) last cigarette date  Alcohol Usage:  Sexual History:     Family History   IBD (  ) Yes   (  ) No  GI Malignancy (  )  Yes    (  ) No    Health Management     Last Colonoscopy - 5  years      (     ) Advanced Directives: (     ) None    (      ) DNR    (     ) DNI    (     ) Health Care Proxy:     Review of Systems:  · ENMT: negative  · Respiratory and Thorax: negative  · Cardiovascular: negative  · Gastrointestinal: see above.  · Genitourinary:	negative  · Musculoskeletal: negative  · Neurological: negative  · Psychiatric: negative  · Hematology/Lymphatics: negative  · Endocrine: negative      Vital Signs Last 24 Hrs  T(C): 36.8 (12 Apr 2022 11:05), Max: 36.9 (12 Apr 2022 00:07)  T(F): 98.2 (12 Apr 2022 11:05), Max: 98.4 (12 Apr 2022 00:07)  HR: 73 (12 Apr 2022 11:05) (48 - 83)  BP: 177/66 (12 Apr 2022 11:05) (134/57 - 207/74)  BP(mean): 100 (11 Apr 2022 19:23) (100 - 100)  RR: 18 (12 Apr 2022 11:05) (18 - 20)  SpO2: 96% (12 Apr 2022 11:05) (95% - 100%)    PHYSICAL EXAM:  · Constitutional: Elderly looking woman, in no acute distress.   · Eyes: EOMI; PERRL; no drainage or redness  · ENMT: No oral lesions; no gross abnormalities  · Neck:	No bruits; no thyromegaly or nodules  · Back:	No deformity or limitation of movement  · Respiratory: Breath Sounds equal & clear to percussion & auscultation, no accessory muscle use  · Cardiovascular: Regular rate & rhythm, normal S1, S2; no murmurs, gallops or rubs; no S3, S4  · Gastrointestinal: Soft, non-tender, no hepatosplenomegaly, normal bowel sounds  . MELVIN: External/internal hemorrhoids, brown stool on glove noted.       LABS:                        10.9   9.37  )-----------( 183      ( 12 Apr 2022 03:45 )             31.5     04-12    141  |  107  |  18.7  ----------------------------<  135<H>  4.3   |  20.0<L>  |  1.11    Ca    8.8      12 Apr 2022 03:45  Mg     2.4     04-12    TPro  6.0<L>  /  Alb  3.9  /  TBili  0.5  /  DBili  x   /  AST  16  /  ALT  15  /  AlkPhos  78  04-11    PT/INR - ( 11 Apr 2022 17:54 )   PT: 11.9 sec;   INR: 1.03 ratio         PTT - ( 11 Apr 2022 17:54 )  PTT:25.2 sec    LIVER FUNCTIONS - ( 11 Apr 2022 17:01 )  Alb: 3.9 g/dL / Pro: 6.0 g/dL / ALK PHOS: 78 U/L / ALT: 15 U/L / AST: 16 U/L / GGT: x             RADIOLOGY & ADDITIONAL TESTS:    INTERPRETATION:  Exam Date: 4/11/2022 5:01 PM    1. CT Angiography of the carotid arteries with  IV contrast  2. CT Angiography of the intracranial circulation with  IV contrast    CLINICAL INFORMATION: Headache, new or worsening    TECHNIQUE:    CT angiography images were acquired from the aortic arch to   the vertex of the skull.   Images were acquired during rapid bolus   intravenous administration of 90 cc of Omnipaque 350. 10 cc was   discarded. 3-D reformats were obtained. Coronal and sagittal reformats   were obtained.    FINDINGS: Comparison is made to prior CTAbrain of 9/21/2016.    The left common carotid artery is unremarkable. There is calcification of   the left carotid bulb. Left external carotid artery is unremarkable. 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 There is   extensiveembolic coil material throughout the cervical petrous cavernous   and supraclinoid portions of the left internal carotid artery. The left   middle cerebral artery and branches are patent secondary to contralateral   cross-filling of flow. Left anterior cerebral artery and branches appear   unremarkable.    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 No new aneurysms are identified.    Rightcommon carotid artery, right carotid bulb, right external carotid   artery, and right cervical internal carotid arteries appear unremarkable.   There is coil material within the distal cavernous segment of the right   internal carotid artery. The right middle cerebral artery and right   anterior cerebral artery and branches are patent without hemodynamically   significant stenosis.    Cervical vertebral arteries and intradural vertebral arteries appear   unremarkable. The basilar artery and posterior cerebral arteries appear   unremarkable.    IMPRESSION:    No significant interval change since prior CTA of 9/21/2016. No new   hemodynamically significant stenosis.    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 No new aneurysms are identified.    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 There is   extensive embolic coil material throughout the cervical petrous cavernous   and supraclinoid portions of the left internal carotid artery.    There is coil material within the distal cavernous segment of the right   internal carotid artery.    < end of copied text >

## 2022-04-12 NOTE — CONSULT NOTE ADULT - ASSESSMENT
81F with hx of CAD (CARLOS pRCA), HTN, HLD, cerebral aneurysm s/p coiling/clipping, migraines, hypothyroidism came to the ED complaining of nausea, vomiting and dizziness. Patient noted multiple episodes of non-bloody emesis, In the ED patient noted to have uncontrolled BP, telemetry with evidence of new onset afib.   Patient endorses palpitation for the past few months  Denies chest pain, no CISNEROS, no leg edema, no orthopnea, no claudication no syncopal episodes.

## 2022-04-12 NOTE — CONSULT NOTE ADULT - PROBLEM SELECTOR RECOMMENDATION 2
stable, CARLOS in pRCA placed in 04/2017   in light of afib, discontinue DAPT and continue with just AC  continue statin, bb, ISMN stable, CARLOS in pRCA placed in 04/2017   Safe to discontinue plavix (in preparation for colonoscopy) and continue with just aspirin   continue statin, bb, ISMN

## 2022-04-12 NOTE — CONSULT NOTE ADULT - PROBLEM SELECTOR RECOMMENDATION 9
Intermittent in nature and related to BM most likely hemorrhoidal bleed.  Primary team requesting GI clearance to start AC for new onset A-fib, patient's last benign colonoscopy 5 years ago. At this point we will recommend to stop Plavix for 4 days, in the meanwhile start Lovenox, and  tentatively schedule patient for EGD/Colon on Monday.   PPI for cytoprotection  Continue monitoring daily CBC. Intermittent in nature and related to BM most likely hemorrhoidal bleed.  Primary team requesting GI clearance to start AC for new onset A-fib, patient's last benign colonoscopy 5 years ago. At this point we will recommend  to stop Plavix for 4 days, in the meanwhile start Lovenox, and  tentatively schedule patient for EGD/Colon on Monday after Cardiac clears patient.   PPI for cytoprotection  Continue monitoring daily CBC.

## 2022-04-13 LAB
ANION GAP SERPL CALC-SCNC: 12 MMOL/L — SIGNIFICANT CHANGE UP (ref 5–17)
APPEARANCE UR: ABNORMAL
BACTERIA # UR AUTO: ABNORMAL
BILIRUB UR-MCNC: NEGATIVE — SIGNIFICANT CHANGE UP
BUN SERPL-MCNC: 26.7 MG/DL — HIGH (ref 8–20)
CALCIUM SERPL-MCNC: 9.1 MG/DL — SIGNIFICANT CHANGE UP (ref 8.6–10.2)
CHLORIDE SERPL-SCNC: 103 MMOL/L — SIGNIFICANT CHANGE UP (ref 98–107)
CO2 SERPL-SCNC: 22 MMOL/L — SIGNIFICANT CHANGE UP (ref 22–29)
COLOR SPEC: YELLOW — SIGNIFICANT CHANGE UP
CREAT SERPL-MCNC: 1.45 MG/DL — HIGH (ref 0.5–1.3)
DIFF PNL FLD: NEGATIVE — SIGNIFICANT CHANGE UP
EGFR: 36 ML/MIN/1.73M2 — LOW
EPI CELLS # UR: SIGNIFICANT CHANGE UP
GLUCOSE BLDC GLUCOMTR-MCNC: 123 MG/DL — HIGH (ref 70–99)
GLUCOSE BLDC GLUCOMTR-MCNC: 142 MG/DL — HIGH (ref 70–99)
GLUCOSE BLDC GLUCOMTR-MCNC: 151 MG/DL — HIGH (ref 70–99)
GLUCOSE BLDC GLUCOMTR-MCNC: 157 MG/DL — HIGH (ref 70–99)
GLUCOSE SERPL-MCNC: 131 MG/DL — HIGH (ref 70–99)
GLUCOSE UR QL: NEGATIVE MG/DL — SIGNIFICANT CHANGE UP
HCT VFR BLD CALC: 31.8 % — LOW (ref 34.5–45)
HGB BLD-MCNC: 10.7 G/DL — LOW (ref 11.5–15.5)
KETONES UR-MCNC: NEGATIVE — SIGNIFICANT CHANGE UP
LEUKOCYTE ESTERASE UR-ACNC: ABNORMAL
MAGNESIUM SERPL-MCNC: 1.8 MG/DL — SIGNIFICANT CHANGE UP (ref 1.6–2.6)
MCHC RBC-ENTMCNC: 30.1 PG — SIGNIFICANT CHANGE UP (ref 27–34)
MCHC RBC-ENTMCNC: 33.6 GM/DL — SIGNIFICANT CHANGE UP (ref 32–36)
MCV RBC AUTO: 89.6 FL — SIGNIFICANT CHANGE UP (ref 80–100)
NITRITE UR-MCNC: NEGATIVE — SIGNIFICANT CHANGE UP
PH UR: 6 — SIGNIFICANT CHANGE UP (ref 5–8)
PLATELET # BLD AUTO: 172 K/UL — SIGNIFICANT CHANGE UP (ref 150–400)
POTASSIUM SERPL-MCNC: 4.6 MMOL/L — SIGNIFICANT CHANGE UP (ref 3.5–5.3)
POTASSIUM SERPL-SCNC: 4.6 MMOL/L — SIGNIFICANT CHANGE UP (ref 3.5–5.3)
PROT UR-MCNC: NEGATIVE — SIGNIFICANT CHANGE UP
RBC # BLD: 3.55 M/UL — LOW (ref 3.8–5.2)
RBC # FLD: 12.5 % — SIGNIFICANT CHANGE UP (ref 10.3–14.5)
RBC CASTS # UR COMP ASSIST: NEGATIVE /HPF — SIGNIFICANT CHANGE UP (ref 0–4)
SODIUM SERPL-SCNC: 137 MMOL/L — SIGNIFICANT CHANGE UP (ref 135–145)
SP GR SPEC: 1.01 — SIGNIFICANT CHANGE UP (ref 1.01–1.02)
UROBILINOGEN FLD QL: NEGATIVE MG/DL — SIGNIFICANT CHANGE UP
WBC # BLD: 8.69 K/UL — SIGNIFICANT CHANGE UP (ref 3.8–10.5)
WBC # FLD AUTO: 8.69 K/UL — SIGNIFICANT CHANGE UP (ref 3.8–10.5)
WBC UR QL: SIGNIFICANT CHANGE UP /HPF (ref 0–5)

## 2022-04-13 PROCEDURE — 99232 SBSQ HOSP IP/OBS MODERATE 35: CPT

## 2022-04-13 PROCEDURE — 99233 SBSQ HOSP IP/OBS HIGH 50: CPT

## 2022-04-13 RX ORDER — AMLODIPINE BESYLATE 2.5 MG/1
5 TABLET ORAL DAILY
Refills: 0 | Status: DISCONTINUED | OUTPATIENT
Start: 2022-04-13 | End: 2022-04-14

## 2022-04-13 RX ORDER — MAGNESIUM OXIDE 400 MG ORAL TABLET 241.3 MG
400 TABLET ORAL
Refills: 0 | Status: COMPLETED | OUTPATIENT
Start: 2022-04-13 | End: 2022-04-15

## 2022-04-13 RX ORDER — SODIUM CHLORIDE 9 MG/ML
1000 INJECTION, SOLUTION INTRAVENOUS
Refills: 0 | Status: COMPLETED | OUTPATIENT
Start: 2022-04-13 | End: 2022-04-14

## 2022-04-13 RX ORDER — ISOSORBIDE MONONITRATE 60 MG/1
120 TABLET, EXTENDED RELEASE ORAL DAILY
Refills: 0 | Status: DISCONTINUED | OUTPATIENT
Start: 2022-04-13 | End: 2022-04-15

## 2022-04-13 RX ADMIN — MAGNESIUM OXIDE 400 MG ORAL TABLET 400 MILLIGRAM(S): 241.3 TABLET ORAL at 18:37

## 2022-04-13 RX ADMIN — Medication 1 TABLET(S): at 18:37

## 2022-04-13 RX ADMIN — Medication 1 TABLET(S): at 12:23

## 2022-04-13 RX ADMIN — MONTELUKAST 10 MILLIGRAM(S): 4 TABLET, CHEWABLE ORAL at 21:36

## 2022-04-13 RX ADMIN — LORATADINE 10 MILLIGRAM(S): 10 TABLET ORAL at 21:36

## 2022-04-13 RX ADMIN — Medication 1 TABLET(S): at 05:16

## 2022-04-13 RX ADMIN — LOSARTAN POTASSIUM 100 MILLIGRAM(S): 100 TABLET, FILM COATED ORAL at 05:16

## 2022-04-13 RX ADMIN — ENOXAPARIN SODIUM 80 MILLIGRAM(S): 100 INJECTION SUBCUTANEOUS at 05:17

## 2022-04-13 RX ADMIN — ATORVASTATIN CALCIUM 20 MILLIGRAM(S): 80 TABLET, FILM COATED ORAL at 21:36

## 2022-04-13 RX ADMIN — PREGABALIN 1000 MICROGRAM(S): 225 CAPSULE ORAL at 12:31

## 2022-04-13 RX ADMIN — ISOSORBIDE MONONITRATE 120 MILLIGRAM(S): 60 TABLET, EXTENDED RELEASE ORAL at 12:23

## 2022-04-13 RX ADMIN — NEBIVOLOL HYDROCHLORIDE 5 MILLIGRAM(S): 5 TABLET ORAL at 05:16

## 2022-04-13 RX ADMIN — Medication 2 GRAM(S): at 05:16

## 2022-04-13 RX ADMIN — SODIUM CHLORIDE 100 MILLILITER(S): 9 INJECTION, SOLUTION INTRAVENOUS at 09:02

## 2022-04-13 RX ADMIN — Medication 1: at 12:28

## 2022-04-13 RX ADMIN — ENOXAPARIN SODIUM 80 MILLIGRAM(S): 100 INJECTION SUBCUTANEOUS at 18:36

## 2022-04-13 RX ADMIN — Medication 75 MICROGRAM(S): at 05:16

## 2022-04-13 RX ADMIN — Medication 10 MILLIGRAM(S): at 09:02

## 2022-04-13 RX ADMIN — Medication 2 GRAM(S): at 18:36

## 2022-04-13 NOTE — PROGRESS NOTE ADULT - PROBLEM SELECTOR PLAN 1
The patient with intermittent rectal bleeding on aspirin and Plavix. Plavix and Aspirin now d/c and started on therapeutic dose of Lovenox yesterday. Hemoglobin remain stable.  Will continue to monitor CBC for next couple of days and if her hemoglobin remain stable can start Eliquis,  otherwise we will plan for EGD/ colonoscopy on Monday (04/18).   Continue Protonix for cytoprotection.   Diet as tolerated.   GI will continue to follow. The patient with intermittent rectal bleeding on aspirin and Plavix. Plavix and Aspirin now d/c and started on therapeutic dose of Lovenox yesterday. Hemoglobin remain stable.  Will continue to monitor CBC for next couple of days and if her hemoglobin remain stable can start Eliquis,  otherwise we will plan for EGD/ colonoscopy on Monday if hemoglobin drops on lovenox  (04/18).   Continue Protonix for cytoprotection.   Diet as tolerated.   GI will continue to follow.

## 2022-04-14 LAB
ANION GAP SERPL CALC-SCNC: 16 MMOL/L — SIGNIFICANT CHANGE UP (ref 5–17)
BUN SERPL-MCNC: 23.1 MG/DL — HIGH (ref 8–20)
CALCIUM SERPL-MCNC: 9.3 MG/DL — SIGNIFICANT CHANGE UP (ref 8.6–10.2)
CHLORIDE SERPL-SCNC: 105 MMOL/L — SIGNIFICANT CHANGE UP (ref 98–107)
CO2 SERPL-SCNC: 20 MMOL/L — LOW (ref 22–29)
CREAT SERPL-MCNC: 1.36 MG/DL — HIGH (ref 0.5–1.3)
EGFR: 39 ML/MIN/1.73M2 — LOW
GLUCOSE BLDC GLUCOMTR-MCNC: 122 MG/DL — HIGH (ref 70–99)
GLUCOSE BLDC GLUCOMTR-MCNC: 129 MG/DL — HIGH (ref 70–99)
GLUCOSE BLDC GLUCOMTR-MCNC: 145 MG/DL — HIGH (ref 70–99)
GLUCOSE SERPL-MCNC: 130 MG/DL — HIGH (ref 70–99)
HCT VFR BLD CALC: 32 % — LOW (ref 34.5–45)
HGB BLD-MCNC: 10.8 G/DL — LOW (ref 11.5–15.5)
MAGNESIUM SERPL-MCNC: 1.8 MG/DL — SIGNIFICANT CHANGE UP (ref 1.6–2.6)
MCHC RBC-ENTMCNC: 30.2 PG — SIGNIFICANT CHANGE UP (ref 27–34)
MCHC RBC-ENTMCNC: 33.8 GM/DL — SIGNIFICANT CHANGE UP (ref 32–36)
MCV RBC AUTO: 89.4 FL — SIGNIFICANT CHANGE UP (ref 80–100)
PLATELET # BLD AUTO: 163 K/UL — SIGNIFICANT CHANGE UP (ref 150–400)
POTASSIUM SERPL-MCNC: 4.5 MMOL/L — SIGNIFICANT CHANGE UP (ref 3.5–5.3)
POTASSIUM SERPL-SCNC: 4.5 MMOL/L — SIGNIFICANT CHANGE UP (ref 3.5–5.3)
RBC # BLD: 3.58 M/UL — LOW (ref 3.8–5.2)
RBC # FLD: 12.4 % — SIGNIFICANT CHANGE UP (ref 10.3–14.5)
SODIUM SERPL-SCNC: 141 MMOL/L — SIGNIFICANT CHANGE UP (ref 135–145)
WBC # BLD: 6.44 K/UL — SIGNIFICANT CHANGE UP (ref 3.8–10.5)
WBC # FLD AUTO: 6.44 K/UL — SIGNIFICANT CHANGE UP (ref 3.8–10.5)

## 2022-04-14 PROCEDURE — 99233 SBSQ HOSP IP/OBS HIGH 50: CPT

## 2022-04-14 RX ORDER — HYDROCORTISONE 1 %
1 OINTMENT (GRAM) TOPICAL
Refills: 0 | Status: DISCONTINUED | OUTPATIENT
Start: 2022-04-14 | End: 2022-04-15

## 2022-04-14 RX ORDER — AMLODIPINE BESYLATE 2.5 MG/1
10 TABLET ORAL DAILY
Refills: 0 | Status: DISCONTINUED | OUTPATIENT
Start: 2022-04-15 | End: 2022-04-15

## 2022-04-14 RX ORDER — APIXABAN 2.5 MG/1
5 TABLET, FILM COATED ORAL EVERY 12 HOURS
Refills: 0 | Status: DISCONTINUED | OUTPATIENT
Start: 2022-04-14 | End: 2022-04-15

## 2022-04-14 RX ORDER — AMLODIPINE BESYLATE 2.5 MG/1
5 TABLET ORAL ONCE
Refills: 0 | Status: COMPLETED | OUTPATIENT
Start: 2022-04-14 | End: 2022-04-14

## 2022-04-14 RX ORDER — SENNA PLUS 8.6 MG/1
2 TABLET ORAL AT BEDTIME
Refills: 0 | Status: DISCONTINUED | OUTPATIENT
Start: 2022-04-14 | End: 2022-04-15

## 2022-04-14 RX ORDER — HYDRALAZINE HCL 50 MG
10 TABLET ORAL EVERY 8 HOURS
Refills: 0 | Status: DISCONTINUED | OUTPATIENT
Start: 2022-04-14 | End: 2022-04-15

## 2022-04-14 RX ORDER — POLYETHYLENE GLYCOL 3350 17 G/17G
17 POWDER, FOR SOLUTION ORAL DAILY
Refills: 0 | Status: DISCONTINUED | OUTPATIENT
Start: 2022-04-14 | End: 2022-04-15

## 2022-04-14 RX ADMIN — NEBIVOLOL HYDROCHLORIDE 5 MILLIGRAM(S): 5 TABLET ORAL at 05:12

## 2022-04-14 RX ADMIN — Medication 10 MILLIGRAM(S): at 18:42

## 2022-04-14 RX ADMIN — LOSARTAN POTASSIUM 100 MILLIGRAM(S): 100 TABLET, FILM COATED ORAL at 05:15

## 2022-04-14 RX ADMIN — MAGNESIUM OXIDE 400 MG ORAL TABLET 400 MILLIGRAM(S): 241.3 TABLET ORAL at 18:43

## 2022-04-14 RX ADMIN — MAGNESIUM OXIDE 400 MG ORAL TABLET 400 MILLIGRAM(S): 241.3 TABLET ORAL at 12:20

## 2022-04-14 RX ADMIN — SENNA PLUS 2 TABLET(S): 8.6 TABLET ORAL at 21:31

## 2022-04-14 RX ADMIN — PREGABALIN 1000 MICROGRAM(S): 225 CAPSULE ORAL at 12:06

## 2022-04-14 RX ADMIN — MONTELUKAST 10 MILLIGRAM(S): 4 TABLET, CHEWABLE ORAL at 21:31

## 2022-04-14 RX ADMIN — POLYETHYLENE GLYCOL 3350 17 GRAM(S): 17 POWDER, FOR SOLUTION ORAL at 10:07

## 2022-04-14 RX ADMIN — Medication 75 MICROGRAM(S): at 05:14

## 2022-04-14 RX ADMIN — ATORVASTATIN CALCIUM 20 MILLIGRAM(S): 80 TABLET, FILM COATED ORAL at 21:32

## 2022-04-14 RX ADMIN — Medication 1 TABLET(S): at 12:20

## 2022-04-14 RX ADMIN — Medication 1 TABLET(S): at 05:14

## 2022-04-14 RX ADMIN — Medication 2 GRAM(S): at 18:42

## 2022-04-14 RX ADMIN — AMLODIPINE BESYLATE 5 MILLIGRAM(S): 2.5 TABLET ORAL at 10:07

## 2022-04-14 RX ADMIN — SODIUM CHLORIDE 100 MILLILITER(S): 9 INJECTION, SOLUTION INTRAVENOUS at 10:06

## 2022-04-14 RX ADMIN — Medication 2 GRAM(S): at 05:13

## 2022-04-14 RX ADMIN — LORATADINE 10 MILLIGRAM(S): 10 TABLET ORAL at 21:31

## 2022-04-14 RX ADMIN — APIXABAN 5 MILLIGRAM(S): 2.5 TABLET, FILM COATED ORAL at 18:42

## 2022-04-14 RX ADMIN — Medication 1 TABLET(S): at 18:42

## 2022-04-14 RX ADMIN — AMLODIPINE BESYLATE 5 MILLIGRAM(S): 2.5 TABLET ORAL at 05:14

## 2022-04-14 RX ADMIN — ISOSORBIDE MONONITRATE 120 MILLIGRAM(S): 60 TABLET, EXTENDED RELEASE ORAL at 12:20

## 2022-04-14 RX ADMIN — MAGNESIUM OXIDE 400 MG ORAL TABLET 400 MILLIGRAM(S): 241.3 TABLET ORAL at 08:08

## 2022-04-14 RX ADMIN — ENOXAPARIN SODIUM 80 MILLIGRAM(S): 100 INJECTION SUBCUTANEOUS at 05:12

## 2022-04-14 NOTE — PATIENT PROFILE ADULT - NSPROPTRIGHTNOTIFY_GEN_A_NUR
Endometrial polyp  11/19/2018    Active  Edita Rebolledo  Fibroids, submucosal  11/19/2018    Active  Edita Rebolledo yes

## 2022-04-14 NOTE — PROGRESS NOTE ADULT - PROBLEM SELECTOR PLAN 1
The patient with intermittent rectal bleeding was aspirin and Plavix, possibly hemorrhoidal bleed. Plavix and Aspirin now on hold and started on therapeutic dose of Lovenox. Hemoglobin remain stable at 10.8. No GI complaints. No BMs since Monday but reports bright blood on toilet paper this morning when wiping.   Continue to monitor CBC, transfuse as needed   Continue Protonix for cytoprotection.   Diet as tolerated.   Will recommend Anusol suppository BID.

## 2022-04-14 NOTE — PATIENT PROFILE ADULT - FALL HARM RISK - HARM RISK INTERVENTIONS

## 2022-04-14 NOTE — PATIENT PROFILE ADULT - FALL HARM RISK - PATIENT NEEDS ASSISTANCE
Ms Jorge I put your appt on 8/20/19 at 1140 am. Let me know if you can not make this appt   Standing/Walking

## 2022-04-14 NOTE — PROGRESS NOTE ADULT - ASSESSMENT
81 year old female with history of Cerebral Aneurysms s/p Coiling/Clipping, Migraine, CAD, HTN, HLD, Prediabetes and Hypothyroidism presented with nausea, vomiting and dizziness.   In the ED, found to have elevated BP (SBP >200), bradycardia. CT head, CT head/neck with IV: no acute finding. Started on Nicardipine for BP controlled initially and then weaned off. MICU was consulted. ECG showed A. Fib - no prior history.     1) Hypertensive Urgency  - s/p Nicardipine Infusion   - Continue Losartan 100 mg daily   - Continue Bystolic 5 mg daily   - Added Imdur, increased to 120 mg daily   - Increased Amlodipine to 10 mg daily  - Add Hydralazine 10 mg TID  - Cardiology follow up noted     2) Paroxysmal A. Fib  - Rate controlled  - Continue Bystolic  - Cardiology recommended AC. Patient with history of lower GIB, suspected hemorrhoidal. Patient needs Colonoscopy however she has to be off Plavix for 4 days. Discussed with GI, since patient is off DAPT. Recommended to start FD Lovenox, if remains stable then switch to Eliquis in a day or two.    - H&H is stable. Switch FD Lovenox to Eliquis.   - Cardio / GI follow ups noted     3) Nausea and Vomiting  - Likely due to hypertensive Urgency  - Improved      4) Hypomagnesemia  - Repleted     5) Abnormal BMP  - Likely an error as repeat labs are normal     6) Leukocytosis   - Likely reactive  - No signs of infection     7) DORINA  - Avoid nephrotoxic medications  - s/p IVF  - Monitor renal function     8) History of Prediabetes  - Now with DM 2  - HbA1c 6.8  - Accu checks and ISS    9) CAD / HLD  - Hold Aspirin and Plavix as per Cardio (last stent 04/2017)  - Patient is started on Eliquis    - Continue Lipitor, Lovaza and Bystolic     10) Hypothyroidism  - Continue Synthroid     DVT Prophylaxis -- Patient is on Eliquis     Dispo: Home in 48 hours.     Updated patient's daughter at bedside.       
81 year old female with history of Cerebral Aneurysms s/p Coiling/Clipping, Migraine, CAD, HTN, HLD, Prediabetes and Hypothyroidism presented with nausea, vomiting and dizziness.   In the ED, found to have elevated BP (SBP >200), bradycardia. CT head, CT head/neck with IV: no acute finding. Started on Nicardipine for BP controlled initially and then weaned off. MICU was consulted. ECG showed A. Fib - no prior history.     1) Hypertensive Urgency  - s/p Nicardipine Infusion   - Continue Losartan 100 mg daily   - Continue Bystolic 5 mg daily   - Added Imdur, increased to 120 mg daily   - Amlodipine 5 mg daily was added today   - Cardiology follow up noted     2) Paroxysmal A. Fib  - Rate controlled  - Continue Bystolic  - Cardiology recommended AC. Patient with history of lower GIB, suspected hemorrhoidal. Patient needs Colonoscopy however she has to be off Plavix for 4 days. Discussed with GI, since patient is off DAPT. Will start FD Lovenox, if remains stable then will switch to Eliquis in a day or two.    - Started FD Lovenox  - Cardio / GI follow ups noted     3) Nausea and Vomiting  - Likely due to hypertensive Urgency  - Improved      4) Hypomagnesemia  - Repleted     5) Abnormal BMP  - Likely an error as repeat labs are normal     6) Leukocytosis   - Likely reactive  - No signs of infection     7) DORINA  - Avoid nephrotoxic medications  - Gentle hydration   - Monitor renal function     8) History of Prediabetes  - Now with DM 2  - HbA1c 6.8  - Accu checks and ISS    9) CAD / HLD  - Hold Aspirin and Plavix as per Cardio (last stent 04/2017)  - Patient will be started on Eliquis if remains stable on FD Lovenox   - Continue Lipitor, Lovaza and Bystolic     10) Hypothyroidism  - Continue Synthroid     DVT Prophylaxis -- Patient is on FD Lovenox    Dispo: Home in 2-3 days.      
81 year old female with history of Cerebral Aneurysms s/p Coiling/Clipping, Migraine, CAD, HTN, HLD, Prediabetes and Hypothyroidism presented with nausea, vomiting and dizziness.   In the ED, found to have elevated BP (SBP >200), bradycardia. CT head, CT head/neck with IV: no acute finding. Started on Nicardipine for BP controlled initially and then weaned off. MICU was consulted. ECG showed A. Fib - no prior history.     1) Hypertensive Urgency  - s/p Nicardipine Infusion   - Continue Losartan 100 mg daily   - Continue Bystolic 5 mg daily   - Increase Imdur to 60 mg daily   - Cardiology Consult noted     2) Paroxysmal A. Fib  - Rate controlled  - Continue Bystolic  - Cardiology recommended AC. Patient with history of lower GIB, suspected hemorrhoidal. Patient needs Colonoscopy however she has to be off Plavix for 4 days. Discussed with GI, since patient is off DAPT. Will start FD Lovenox, if remains stable then will switch to Eliquis in a day or two.    - Start FD Lovenox    3) Nausea and Vomiting  - Likely due to hypertensive Urgency  - Improving     4) Hypomagnesemia  - Repleted     5) Abnormal BMP  - Likely an error as repeat labs are normal     6) Leukocytosis   - Likely reactive  - No signs of infection     7) History of Prediabetes  - Now with DM 2  - HbA1c 6.8  - Accu checks and ISS    8) CAD / HLD  - Hold Aspirin and Plavix as per Cardio (last stent 04/2017)  - Patient will be started on Eliquis if remains stable on FD Lovenox   - Continue Lipitor, Lovaza and Bystolic     9) Hypothyroidism  - Continue Synthroid     DVT Prophylaxis -- Patient is on FD Lovenox    Dispo: Home in 48 hours.     Updated patient's daughters.

## 2022-04-15 ENCOUNTER — TRANSCRIPTION ENCOUNTER (OUTPATIENT)
Age: 82
End: 2022-04-15

## 2022-04-15 VITALS
SYSTOLIC BLOOD PRESSURE: 126 MMHG | TEMPERATURE: 98 F | DIASTOLIC BLOOD PRESSURE: 64 MMHG | OXYGEN SATURATION: 94 % | HEART RATE: 66 BPM | RESPIRATION RATE: 18 BRPM

## 2022-04-15 LAB
ANION GAP SERPL CALC-SCNC: 13 MMOL/L — SIGNIFICANT CHANGE UP (ref 5–17)
BUN SERPL-MCNC: 22.3 MG/DL — HIGH (ref 8–20)
CALCIUM SERPL-MCNC: 9.5 MG/DL — SIGNIFICANT CHANGE UP (ref 8.6–10.2)
CHLORIDE SERPL-SCNC: 103 MMOL/L — SIGNIFICANT CHANGE UP (ref 98–107)
CO2 SERPL-SCNC: 23 MMOL/L — SIGNIFICANT CHANGE UP (ref 22–29)
CREAT SERPL-MCNC: 1.19 MG/DL — SIGNIFICANT CHANGE UP (ref 0.5–1.3)
EGFR: 46 ML/MIN/1.73M2 — LOW
GLUCOSE BLDC GLUCOMTR-MCNC: 137 MG/DL — HIGH (ref 70–99)
GLUCOSE BLDC GLUCOMTR-MCNC: 145 MG/DL — HIGH (ref 70–99)
GLUCOSE SERPL-MCNC: 132 MG/DL — HIGH (ref 70–99)
HCT VFR BLD CALC: 34.5 % — SIGNIFICANT CHANGE UP (ref 34.5–45)
HGB BLD-MCNC: 11.5 G/DL — SIGNIFICANT CHANGE UP (ref 11.5–15.5)
MCHC RBC-ENTMCNC: 30.6 PG — SIGNIFICANT CHANGE UP (ref 27–34)
MCHC RBC-ENTMCNC: 33.3 GM/DL — SIGNIFICANT CHANGE UP (ref 32–36)
MCV RBC AUTO: 91.8 FL — SIGNIFICANT CHANGE UP (ref 80–100)
PLATELET # BLD AUTO: 188 K/UL — SIGNIFICANT CHANGE UP (ref 150–400)
POTASSIUM SERPL-MCNC: 4.4 MMOL/L — SIGNIFICANT CHANGE UP (ref 3.5–5.3)
POTASSIUM SERPL-SCNC: 4.4 MMOL/L — SIGNIFICANT CHANGE UP (ref 3.5–5.3)
RBC # BLD: 3.76 M/UL — LOW (ref 3.8–5.2)
RBC # FLD: 12.5 % — SIGNIFICANT CHANGE UP (ref 10.3–14.5)
SODIUM SERPL-SCNC: 139 MMOL/L — SIGNIFICANT CHANGE UP (ref 135–145)
WBC # BLD: 5.73 K/UL — SIGNIFICANT CHANGE UP (ref 3.8–10.5)
WBC # FLD AUTO: 5.73 K/UL — SIGNIFICANT CHANGE UP (ref 3.8–10.5)

## 2022-04-15 PROCEDURE — 93306 TTE W/DOPPLER COMPLETE: CPT | Mod: 26

## 2022-04-15 PROCEDURE — 99233 SBSQ HOSP IP/OBS HIGH 50: CPT

## 2022-04-15 PROCEDURE — 99239 HOSP IP/OBS DSCHRG MGMT >30: CPT

## 2022-04-15 RX ORDER — LACTULOSE 10 G/15ML
10 SOLUTION ORAL ONCE
Refills: 0 | Status: COMPLETED | OUTPATIENT
Start: 2022-04-15 | End: 2022-04-15

## 2022-04-15 RX ORDER — APIXABAN 2.5 MG/1
1 TABLET, FILM COATED ORAL
Qty: 60 | Refills: 0
Start: 2022-04-15 | End: 2022-05-14

## 2022-04-15 RX ORDER — POLYETHYLENE GLYCOL 3350 17 G/17G
17 POWDER, FOR SOLUTION ORAL
Qty: 119 | Refills: 0
Start: 2022-04-15 | End: 2022-04-21

## 2022-04-15 RX ORDER — HYDRALAZINE HCL 50 MG
1 TABLET ORAL
Qty: 60 | Refills: 0
Start: 2022-04-15 | End: 2022-05-14

## 2022-04-15 RX ORDER — AMLODIPINE BESYLATE 2.5 MG/1
1 TABLET ORAL
Qty: 30 | Refills: 0
Start: 2022-04-15 | End: 2022-05-14

## 2022-04-15 RX ORDER — METFORMIN HYDROCHLORIDE 850 MG/1
1 TABLET ORAL
Qty: 60 | Refills: 0
Start: 2022-04-15 | End: 2022-05-14

## 2022-04-15 RX ORDER — CLOPIDOGREL BISULFATE 75 MG/1
1 TABLET, FILM COATED ORAL
Qty: 0 | Refills: 0 | DISCHARGE

## 2022-04-15 RX ORDER — SENNA PLUS 8.6 MG/1
2 TABLET ORAL
Qty: 60 | Refills: 0
Start: 2022-04-15 | End: 2022-05-14

## 2022-04-15 RX ORDER — HYDROCORTISONE 1 %
1 OINTMENT (GRAM) TOPICAL
Qty: 14 | Refills: 0
Start: 2022-04-15 | End: 2022-04-21

## 2022-04-15 RX ORDER — ISOSORBIDE MONONITRATE 60 MG/1
1 TABLET, EXTENDED RELEASE ORAL
Qty: 0 | Refills: 0 | DISCHARGE

## 2022-04-15 RX ORDER — HYDRALAZINE HCL 50 MG
25 TABLET ORAL EVERY 12 HOURS
Refills: 0 | Status: DISCONTINUED | OUTPATIENT
Start: 2022-04-15 | End: 2022-04-15

## 2022-04-15 RX ORDER — ISOSORBIDE MONONITRATE 60 MG/1
1 TABLET, EXTENDED RELEASE ORAL
Qty: 30 | Refills: 0
Start: 2022-04-15 | End: 2022-05-14

## 2022-04-15 RX ADMIN — Medication 2 GRAM(S): at 17:34

## 2022-04-15 RX ADMIN — Medication 75 MICROGRAM(S): at 05:32

## 2022-04-15 RX ADMIN — PREGABALIN 1000 MICROGRAM(S): 225 CAPSULE ORAL at 12:05

## 2022-04-15 RX ADMIN — Medication 1 TABLET(S): at 05:31

## 2022-04-15 RX ADMIN — Medication 1 TABLET(S): at 12:05

## 2022-04-15 RX ADMIN — Medication 2 GRAM(S): at 05:32

## 2022-04-15 RX ADMIN — LOSARTAN POTASSIUM 100 MILLIGRAM(S): 100 TABLET, FILM COATED ORAL at 05:30

## 2022-04-15 RX ADMIN — AMLODIPINE BESYLATE 10 MILLIGRAM(S): 2.5 TABLET ORAL at 05:32

## 2022-04-15 RX ADMIN — LACTULOSE 10 GRAM(S): 10 SOLUTION ORAL at 09:47

## 2022-04-15 RX ADMIN — ISOSORBIDE MONONITRATE 120 MILLIGRAM(S): 60 TABLET, EXTENDED RELEASE ORAL at 12:05

## 2022-04-15 RX ADMIN — NEBIVOLOL HYDROCHLORIDE 5 MILLIGRAM(S): 5 TABLET ORAL at 05:31

## 2022-04-15 RX ADMIN — MAGNESIUM OXIDE 400 MG ORAL TABLET 400 MILLIGRAM(S): 241.3 TABLET ORAL at 12:05

## 2022-04-15 RX ADMIN — APIXABAN 5 MILLIGRAM(S): 2.5 TABLET, FILM COATED ORAL at 17:34

## 2022-04-15 RX ADMIN — Medication 1 SUPPOSITORY(S): at 05:32

## 2022-04-15 RX ADMIN — Medication 10 MILLIGRAM(S): at 01:48

## 2022-04-15 RX ADMIN — MAGNESIUM OXIDE 400 MG ORAL TABLET 400 MILLIGRAM(S): 241.3 TABLET ORAL at 08:38

## 2022-04-15 RX ADMIN — Medication 1 TABLET(S): at 17:34

## 2022-04-15 RX ADMIN — Medication 25 MILLIGRAM(S): at 09:47

## 2022-04-15 RX ADMIN — APIXABAN 5 MILLIGRAM(S): 2.5 TABLET, FILM COATED ORAL at 05:32

## 2022-04-15 NOTE — PROGRESS NOTE ADULT - SUBJECTIVE AND OBJECTIVE BOX
Chief Complaint: This is a 81y old woman patient being seen in follow-up consultation for     Interval HPI / 24H events:  Patient seen and evaluated at bedside, reporting no complaints, no overnight events.  Plavix and Aspirin is now held, Full dose Lovenox was started yesterday. Her hemoglobin remain stable, today 10.7 gm. No evidence of overt GI bleed. Reports 1 normal BM today.  Denies nausea, vomiting, abdominal pain, chest pain, shortness of breath, hematemesis, hematochezia, melena.                                                    .     Review of Systems:  · ENMT: negative  · Respiratory and Thorax: negative  · Cardiovascular: negative  · Gastrointestinal: see above.  · Genitourinary:	negative  · Musculoskeletal: negative  · Neurological: negative  · Psychiatric: negative  · Hematology/Lymphatics: negative  · Endocrine: negative      PAST MEDICAL/SURGICAL HISTORY:  Hypothyroid    Seasonal allergies    Stroke    Hypertension    Diverticulitis    Cerebral aneurysm  s/p clipping and coiling    Hypertension    Hyperlipidemia    Hypothyroidism    Vertigo    C. difficile colitis    S/P tonsillectomy    S/P appendectomy    S/P hysterectomy    S/P brain surgery    Cataract of both eyes      MEDICATIONS  (STANDING):  amLODIPine   Tablet 5 milliGRAM(s) Oral daily  atorvastatin 20 milliGRAM(s) Oral at bedtime  calcium carbonate 1250 mG  + Vitamin D (OsCal 500 + D) 1 Tablet(s) Oral two times a day  cyanocobalamin 1000 MICROGram(s) Oral daily  dextrose 5%. 1000 milliLiter(s) (50 mL/Hr) IV Continuous <Continuous>  dextrose 5%. 1000 milliLiter(s) (100 mL/Hr) IV Continuous <Continuous>  dextrose 50% Injectable 25 Gram(s) IV Push once  dextrose 50% Injectable 12.5 Gram(s) IV Push once  dextrose 50% Injectable 25 Gram(s) IV Push once  enoxaparin Injectable 80 milliGRAM(s) SubCutaneous every 12 hours  glucagon  Injectable 1 milliGRAM(s) IntraMuscular once  insulin lispro (ADMELOG) corrective regimen sliding scale   SubCutaneous three times a day before meals  insulin lispro (ADMELOG) corrective regimen sliding scale   SubCutaneous at bedtime  isosorbide   mononitrate ER Tablet (IMDUR) 120 milliGRAM(s) Oral daily  lactated ringers. 1000 milliLiter(s) (100 mL/Hr) IV Continuous <Continuous>  levothyroxine 75 MICROGram(s) Oral daily  loratadine 10 milliGRAM(s) Oral at bedtime  losartan 100 milliGRAM(s) Oral daily  montelukast 10 milliGRAM(s) Oral at bedtime  multivitamin 1 Tablet(s) Oral daily  nebivolol 5 milliGRAM(s) Oral daily  omega-3-Acid Ethyl Esters 2 Gram(s) Oral two times a day    MEDICATIONS  (PRN):  acetaminophen     Tablet .. 650 milliGRAM(s) Oral every 6 hours PRN Temp greater or equal to 38C (100.4F), Mild Pain (1 - 3)  aluminum hydroxide/magnesium hydroxide/simethicone Suspension 30 milliLiter(s) Oral every 4 hours PRN Dyspepsia  dextrose Oral Gel 15 Gram(s) Oral once PRN Blood Glucose LESS THAN 70 milliGRAM(s)/deciliter  hydrALAZINE Injectable 10 milliGRAM(s) IV Push every 6 hours PRN If SBP > 160  melatonin 3 milliGRAM(s) Oral at bedtime PRN Insomnia  ondansetron Injectable 4 milliGRAM(s) IV Push every 8 hours PRN Nausea and/or Vomiting    codeine (Unknown)  codeine (Vomiting)  Darvon (Unknown)  Darvon (Vomiting)  Flagyl (Diarrhea; Faint; Nausea)  Flagyl (Unknown)  morphine (Unknown)  morphine (Vomiting)    T(C): 37.2 (04-13-22 @ 08:45), Max: 37.2 (04-13-22 @ 04:39)  HR: 67 (04-13-22 @ 08:45) (63 - 73)  BP: 176/73 (04-13-22 @ 08:45) (152/63 - 177/66)  RR: 18 (04-13-22 @ 08:45) (17 - 18)  SpO2: 94% (04-13-22 @ 08:45) (94% - 96%)      PHYSICAL EXAM:    Constitutional: Obese, in no acute distress  Neuro: Awake alert, oriented to person, place and situation, non-focal, speech clear and intact  HEENT: PERRL, anicteric sclerae  Neck: supple, no JVD  CV: regular rate, regular rhythm, +S1S2,   Pulm/chest: lung sounds clear bilaterally, no accessory muscle use noted  Abd: soft, obese, NT, ND, +BS, No guarding, rigidity, rebound tenderness.   Ext: no Cyanosis, clubbing, trace BLE edema  Skin: warm, well perfused, no jaundice   Psych: calm, appropriate affect      LABS:               10.7   8.69  )-----------( 172      ( 04-13 @ 05:58 )             31.8                10.9   9.37  )-----------( 183      ( 04-12 @ 03:45 )             31.5                12.4   12.13 )-----------( 162      ( 04-11 @ 17:54 )             35.7       04-13    137  |  103  |  26.7<H>  ----------------------------<  131<H>  4.6   |  22.0  |  1.45<H>    Ca    9.1      13 Apr 2022 05:58  Mg     1.8     04-13    TPro  6.0<L>  /  Alb  3.9  /  TBili  0.5  /  DBili  x   /  AST  16  /  ALT  15  /  AlkPhos  78  04-11    LIVER FUNCTIONS - ( 11 Apr 2022 17:01 )  Alb: 3.9 g/dL / Pro: 6.0 g/dL / ALK PHOS: 78 U/L / ALT: 15 U/L / AST: 16 U/L / GGT: x             Lipase, Serum: 38 U/L (04-11-22 @ 17:01)    PT/INR - ( 11 Apr 2022 17:54 )   PT: 11.9 sec;   INR: 1.03 ratio         PTT - ( 11 Apr 2022 17:54 )  PTT:25.2 sec        < from: CT Angio Head w/ IV Cont (04.11.22 @ 17:01) >    FINDINGS: Comparison is made to prior [facility] of 9/21/2016.    The left common carotid artery is unremarkable. There is calcification of   the left carotid bulb. Left external carotid artery is unremarkable. 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 There is   extensiveembolic coil material throughout the cervical petrous cavernous   and supraclinoid portions of the left internal carotid artery. The left   middle cerebral artery and branches are patent secondary to contralateral   cross-filling of flow. Left anterior cerebral artery and branches appear   unremarkable.    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 No new aneurysms are identified.    Rightcommon carotid artery, right carotid bulb, right external carotid   artery, and right cervical internal carotid arteries appear unremarkable.   There is coil material within the distal cavernous segment of the right   internal carotid artery. The right middle cerebral artery and right   anterior cerebral artery and branches are patent without hemodynamically   significant stenosis.    Cervical vertebral arteries and intradural vertebral arteries appear   unremarkable. The basilar artery and posterior cerebral arteries appear   unremarkable.    IMPRESSION:    No significant interval change since prior CTA of 9/21/2016. No new   hemodynamically significant stenosis.    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 No new aneurysms are identified.    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 There is   extensive embolic coil material throughout the cervical petrous cavernous   and supraclinoid portions of the left internal carotid artery.    There is coil material within the distal cavernous segment of the right   internal carotid artery.    < end of copied text >    
GWENDOLYN SIMPSON  [id number]      Chief Complaint:  N/V/AFIB    Interval History:  Patient feeling better.  Denies CP, SOB, palps.    Tele:  No events        acetaminophen     Tablet .. 650 milliGRAM(s) Oral every 6 hours PRN  aluminum hydroxide/magnesium hydroxide/simethicone Suspension 30 milliLiter(s) Oral every 4 hours PRN  atorvastatin 20 milliGRAM(s) Oral at bedtime  calcium carbonate 1250 mG  + Vitamin D (OsCal 500 + D) 1 Tablet(s) Oral two times a day  cyanocobalamin 1000 MICROGram(s) Oral daily  dextrose 5%. 1000 milliLiter(s) IV Continuous <Continuous>  dextrose 5%. 1000 milliLiter(s) IV Continuous <Continuous>  dextrose 50% Injectable 25 Gram(s) IV Push once  dextrose 50% Injectable 12.5 Gram(s) IV Push once  dextrose 50% Injectable 25 Gram(s) IV Push once  dextrose Oral Gel 15 Gram(s) Oral once PRN  enoxaparin Injectable 80 milliGRAM(s) SubCutaneous every 12 hours  glucagon  Injectable 1 milliGRAM(s) IntraMuscular once  hydrALAZINE Injectable 10 milliGRAM(s) IV Push every 6 hours PRN  insulin lispro (ADMELOG) corrective regimen sliding scale   SubCutaneous three times a day before meals  insulin lispro (ADMELOG) corrective regimen sliding scale   SubCutaneous at bedtime  isosorbide   mononitrate ER Tablet (IMDUR) 120 milliGRAM(s) Oral daily  levothyroxine 75 MICROGram(s) Oral daily  loratadine 10 milliGRAM(s) Oral at bedtime  losartan 100 milliGRAM(s) Oral daily  magnesium oxide 400 milliGRAM(s) Oral three times a day with meals  melatonin 3 milliGRAM(s) Oral at bedtime PRN  montelukast 10 milliGRAM(s) Oral at bedtime  multivitamin 1 Tablet(s) Oral daily  nebivolol 5 milliGRAM(s) Oral daily  omega-3-Acid Ethyl Esters 2 Gram(s) Oral two times a day  ondansetron Injectable 4 milliGRAM(s) IV Push every 8 hours PRN  polyethylene glycol 3350 17 Gram(s) Oral daily PRN  senna 2 Tablet(s) Oral at bedtime          Physical Exam:  T(C): 36.8 (04-14-22 @ 10:58), Max: 37 (04-13-22 @ 20:23)  HR: 67 (04-14-22 @ 10:58) (62 - 67)  BP: 153/70 (04-14-22 @ 10:58) (148/68 - 165/71)  RR: 18 (04-14-22 @ 10:58) (18 - 18)  SpO2: 94% (04-14-22 @ 10:58) (91% - 95%)  Wt(kg): --  General: Comfortable in NAD  Neck: No JVD  CVS: nl s1s2, no s3  Pulm: CTA b/l  Abd: soft, non-tender  Ext: No c/c/e  Neuro A&O x3  Psych: Normal affect      Labs:   14 Apr 2022 06:31    141    |  105    |  23.1   ----------------------------<  130    4.5     |  20.0   |  1.36     Ca    9.3        14 Apr 2022 06:31  Mg     1.8       14 Apr 2022 06:31                            10.8   6.44  )-----------( 163      ( 14 Apr 2022 06:31 )             32.0             Assessment:   81F PMHx CAD (CARLOS pRCA), HTN, HLD, cerebral aneurysm s/p coiling/clipping, migraines, hypothyroidism came to the ED complaining of nausea, vomiting and dizziness. Patient noted multiple episodes of non-bloody emesis, In the ED patient noted to have uncontrolled BP, telemetry with evidence of new onset AF.   -Now in SR and feeling better.  -H/H stable on Lovenox.  Has some mild BRBPR, ?hemorrhoids.  If ok per GI start Eliquis tomorrow.  -BP still a bit high, Amlodipine was added.  Will not change for now.      Plan:  1. Continue Lovenox today and if H/H stable and ok per GI start Eliquis tomorrow.  For now no GI procedures planned.  2. Continue other current CV meds at current doses.  3. Continue off DAPT while on full dose A/C.  4. F/u echo.  5. Follow BP and consider additional med adjustments.        
GWENDOLYN SIMPSON  [id number]      Chief Complaint:  N/V/AFIB    Interval History:  No events overnight    Tele:    NSR    acetaminophen     Tablet .. 650 milliGRAM(s) Oral every 6 hours PRN  aluminum hydroxide/magnesium hydroxide/simethicone Suspension 30 milliLiter(s) Oral every 4 hours PRN  amLODIPine   Tablet 5 milliGRAM(s) Oral daily  atorvastatin 20 milliGRAM(s) Oral at bedtime  calcium carbonate 1250 mG  + Vitamin D (OsCal 500 + D) 1 Tablet(s) Oral two times a day  cyanocobalamin 1000 MICROGram(s) Oral daily  dextrose 5%. 1000 milliLiter(s) IV Continuous <Continuous>  dextrose 5%. 1000 milliLiter(s) IV Continuous <Continuous>  dextrose 50% Injectable 25 Gram(s) IV Push once  dextrose 50% Injectable 12.5 Gram(s) IV Push once  dextrose 50% Injectable 25 Gram(s) IV Push once  dextrose Oral Gel 15 Gram(s) Oral once PRN  enoxaparin Injectable 80 milliGRAM(s) SubCutaneous every 12 hours  glucagon  Injectable 1 milliGRAM(s) IntraMuscular once  hydrALAZINE Injectable 10 milliGRAM(s) IV Push every 6 hours PRN  insulin lispro (ADMELOG) corrective regimen sliding scale   SubCutaneous three times a day before meals  insulin lispro (ADMELOG) corrective regimen sliding scale   SubCutaneous at bedtime  isosorbide   mononitrate ER Tablet (IMDUR) 120 milliGRAM(s) Oral daily  lactated ringers. 1000 milliLiter(s) IV Continuous <Continuous>  levothyroxine 75 MICROGram(s) Oral daily  loratadine 10 milliGRAM(s) Oral at bedtime  losartan 100 milliGRAM(s) Oral daily  melatonin 3 milliGRAM(s) Oral at bedtime PRN  montelukast 10 milliGRAM(s) Oral at bedtime  multivitamin 1 Tablet(s) Oral daily  nebivolol 5 milliGRAM(s) Oral daily  omega-3-Acid Ethyl Esters 2 Gram(s) Oral two times a day  ondansetron Injectable 4 milliGRAM(s) IV Push every 8 hours PRN          Physical Exam:  T(C): 37.2 (04-13-22 @ 08:45), Max: 37.2 (04-13-22 @ 04:39)  HR: 67 (04-13-22 @ 08:45) (63 - 73)  BP: 176/73 (04-13-22 @ 08:45) (152/63 - 177/66)  RR: 18 (04-13-22 @ 08:45) (17 - 18)  SpO2: 94% (04-13-22 @ 08:45) (94% - 96%)  Wt(kg): --  General: Comfortable in NAD  Neck: No JVD  CVS: nl s1s2, no s3  Pulm: CTA b/l  Abd: soft, non-tender  Ext: No c/c/e  Neuro A&O x3  Psych: Normal affect      Labs:   13 Apr 2022 05:58    137    |  103    |  26.7   ----------------------------<  131    4.6     |  22.0   |  1.45     Ca    9.1        13 Apr 2022 05:58  Mg     1.8       13 Apr 2022 05:58    TPro  6.0    /  Alb  3.9    /  TBili  0.5    /  DBili  x      /  AST  16     /  ALT  15     /  AlkPhos  78     11 Apr 2022 17:01                          10.7   8.69  )-----------( 172      ( 13 Apr 2022 05:58 )             31.8     PT/INR - ( 11 Apr 2022 17:54 )   PT: 11.9 sec;   INR: 1.03 ratio         PTT - ( 11 Apr 2022 17:54 )  PTT:25.2 sec  CARDIAC MARKERS ( 11 Apr 2022 17:02 )  x     / <0.01 ng/mL / x     / x     / x              · Assessment	   81F with hx of CAD (CARLOS pRCA), HTN, HLD, cerebral aneurysm s/p coiling/clipping, migraines, hypothyroidism came to the ED complaining of nausea, vomiting and dizziness. Patient noted multiple episodes of non-bloody emesis, In the ED patient noted to have uncontrolled BP, telemetry with evidence of new onset afib.   Patient endorses palpitation for the past few months  Denies chest pain, no CISNEROS, no leg edema, no orthopnea, no claudication no syncopal episodes.      Problem/Recommendation - 1:  ·  Problem: Afib.   ·  Recommendation: paroxysmal   currently in sinus   continue BB   GI recs appreciated, patient started on 48h trial of lovenox with the intention to find out if she develops anemia preventing anticoagulation until evaluated with colonoscopy.     Problem/Recommendation - 2:  ·  Problem: CAD (coronary artery disease).   ·  Recommendation: stable, CARLOS in pRCA placed in 04/2017   Safe to discontinue DAPT while fully anticoagulated  continue statin, bb, ISMN, losartan   amlodipine added for BP control   
    Chief Complaint:  This is a 81y old woman patient being seen in follow-up consultation for rectal bleed.       Interval Events / Subjective: Patient seen and evaluated at bedside, reporting no complaints, no overnight events. Denies nausea, vomiting, abdominal pain, chest pain, shortness of breath, hematemesis, hematochezia, melena. Hemoglobin stable at 10.8 this morning. Denies any bowel movements since Monday. Reports bright red blood this morning on toilet paper when wiping. Tolerating diet.       REVIEW OF SYSTEMS:   General: Negative  HEENT: Negative  CV: Negative  Respiratory: Negative  GI: See HPI  : Negative  MSK: Negative  Hematologic: Negative  Skin: Negative    MEDICATIONS:   MEDICATIONS  (STANDING):  atorvastatin 20 milliGRAM(s) Oral at bedtime  calcium carbonate 1250 mG  + Vitamin D (OsCal 500 + D) 1 Tablet(s) Oral two times a day  cyanocobalamin 1000 MICROGram(s) Oral daily  dextrose 5%. 1000 milliLiter(s) (50 mL/Hr) IV Continuous <Continuous>  dextrose 5%. 1000 milliLiter(s) (100 mL/Hr) IV Continuous <Continuous>  dextrose 50% Injectable 25 Gram(s) IV Push once  dextrose 50% Injectable 12.5 Gram(s) IV Push once  dextrose 50% Injectable 25 Gram(s) IV Push once  enoxaparin Injectable 80 milliGRAM(s) SubCutaneous every 12 hours  glucagon  Injectable 1 milliGRAM(s) IntraMuscular once  insulin lispro (ADMELOG) corrective regimen sliding scale   SubCutaneous three times a day before meals  insulin lispro (ADMELOG) corrective regimen sliding scale   SubCutaneous at bedtime  isosorbide   mononitrate ER Tablet (IMDUR) 120 milliGRAM(s) Oral daily  levothyroxine 75 MICROGram(s) Oral daily  loratadine 10 milliGRAM(s) Oral at bedtime  losartan 100 milliGRAM(s) Oral daily  magnesium oxide 400 milliGRAM(s) Oral three times a day with meals  montelukast 10 milliGRAM(s) Oral at bedtime  multivitamin 1 Tablet(s) Oral daily  nebivolol 5 milliGRAM(s) Oral daily  omega-3-Acid Ethyl Esters 2 Gram(s) Oral two times a day  senna 2 Tablet(s) Oral at bedtime    MEDICATIONS  (PRN):  acetaminophen     Tablet .. 650 milliGRAM(s) Oral every 6 hours PRN Temp greater or equal to 38C (100.4F), Mild Pain (1 - 3)  aluminum hydroxide/magnesium hydroxide/simethicone Suspension 30 milliLiter(s) Oral every 4 hours PRN Dyspepsia  dextrose Oral Gel 15 Gram(s) Oral once PRN Blood Glucose LESS THAN 70 milliGRAM(s)/deciliter  hydrALAZINE Injectable 10 milliGRAM(s) IV Push every 6 hours PRN If SBP > 160  melatonin 3 milliGRAM(s) Oral at bedtime PRN Insomnia  ondansetron Injectable 4 milliGRAM(s) IV Push every 8 hours PRN Nausea and/or Vomiting  polyethylene glycol 3350 17 Gram(s) Oral daily PRN Constipation      ALLERGIES:   Allergies    codeine (Unknown)  codeine (Vomiting)  Darvon (Unknown)  Darvon (Vomiting)  Flagyl (Diarrhea; Faint; Nausea)  Flagyl (Unknown)  morphine (Unknown)  morphine (Vomiting)    Intolerances        VITAL SIGNS:   Vital Signs Last 24 Hrs  T(C): 36.8 (2022 10:58), Max: 37 (2022 20:23)  T(F): 98.3 (2022 10:58), Max: 98.6 (2022 20:23)  HR: 67 (2022 10:58) (62 - 67)  BP: 153/70 (2022 10:58) (148/68 - 165/71)  BP(mean): --  RR: 18 (2022 10:58) (18 - 18)  SpO2: 94% (2022 10:58) (91% - 95%)  I&O's Summary      PHYSICAL EXAM:   GENERAL: Obese, No acute distress  HEENT:  NC/AT,  conjunctivae clear, sclera -anicteric  CHEST:  Full & symmetric excursion, no increased effort, breath sounds clear  HEART:  Regular rhythm, S1, S2, no murmur/rub/S3/S4,  no edema  ABDOMEN: Obese, Soft, non-tender, non-distended, normoactive bowel sounds.  EXTREMITIES: No cyanosis, clubbing or edema  SKIN:  No rash/erythema/ecchymoses/petechiae/wounds/abscess/warm/dry  NEURO:  Alert, oriented      LABS:  CBC Full  -  ( 2022 06:31 )  WBC Count : 6.44 K/uL  RBC Count : 3.58 M/uL  Hemoglobin : 10.8 g/dL  Hematocrit : 32.0 %  Platelet Count - Automated : 163 K/uL  Mean Cell Volume : 89.4 fl  Mean Cell Hemoglobin : 30.2 pg  Mean Cell Hemoglobin Concentration : 33.8 gm/dL  Auto Neutrophil # : x  Auto Lymphocyte # : x  Auto Monocyte # : x  Auto Eosinophil # : x  Auto Basophil # : x  Auto Neutrophil % : x  Auto Lymphocyte % : x  Auto Monocyte % : x  Auto Eosinophil % : x  Auto Basophil % : x        141  |  105  |  23.1<H>  ----------------------------<  130<H>  4.5   |  20.0<L>  |  1.36<H>    Ca    9.3      2022 06:31  Mg     1.8               Urinalysis Basic - ( 2022 08:35 )    Color: Yellow / Appearance: Slightly Turbid / S.015 / pH: x  Gluc: x / Ketone: Negative  / Bili: Negative / Urobili: Negative mg/dL   Blood: x / Protein: Negative / Nitrite: Negative   Leuk Esterase: Trace / RBC: Negative /HPF / WBC 3-5 /HPF   Sq Epi: x / Non Sq Epi: Few / Bacteria: Occasional          RADIOLOGY & ADDITIONAL STUDIES (The following images were personally reviewed):    
    Chief Complaint:  This is a 81y old woman patient being seen in follow-up consultation for rectal bleed.       Interval Events / Subjective: Patient seen and evaluated at bedside, reporting no complaints, no overnight events. Denies nausea, vomiting, abdominal pain, chest pain, shortness of breath, hematemesis, hematochezia, melena. Hemoglobin stable at 11.5 this morning. Denies any bowel movements since Monday. Tolerating diet.         REVIEW OF SYSTEMS:   General: Negative  HEENT: Negative  CV: Negative  Respiratory: Negative  GI: See HPI  : Negative  MSK: Negative  Hematologic: Negative  Skin: Negative    MEDICATIONS:   MEDICATIONS  (STANDING):  amLODIPine   Tablet 10 milliGRAM(s) Oral daily  apixaban 5 milliGRAM(s) Oral every 12 hours  atorvastatin 20 milliGRAM(s) Oral at bedtime  calcium carbonate 1250 mG  + Vitamin D (OsCal 500 + D) 1 Tablet(s) Oral two times a day  cyanocobalamin 1000 MICROGram(s) Oral daily  dextrose 5%. 1000 milliLiter(s) (50 mL/Hr) IV Continuous <Continuous>  dextrose 5%. 1000 milliLiter(s) (100 mL/Hr) IV Continuous <Continuous>  dextrose 50% Injectable 25 Gram(s) IV Push once  dextrose 50% Injectable 12.5 Gram(s) IV Push once  dextrose 50% Injectable 25 Gram(s) IV Push once  glucagon  Injectable 1 milliGRAM(s) IntraMuscular once  hydrALAZINE 25 milliGRAM(s) Oral every 12 hours  hydrocortisone hemorrhoidal Suppository 1 Suppository(s) Rectal two times a day  insulin lispro (ADMELOG) corrective regimen sliding scale   SubCutaneous three times a day before meals  insulin lispro (ADMELOG) corrective regimen sliding scale   SubCutaneous at bedtime  isosorbide   mononitrate ER Tablet (IMDUR) 120 milliGRAM(s) Oral daily  levothyroxine 75 MICROGram(s) Oral daily  loratadine 10 milliGRAM(s) Oral at bedtime  losartan 100 milliGRAM(s) Oral daily  magnesium oxide 400 milliGRAM(s) Oral three times a day with meals  montelukast 10 milliGRAM(s) Oral at bedtime  multivitamin 1 Tablet(s) Oral daily  nebivolol 5 milliGRAM(s) Oral daily  omega-3-Acid Ethyl Esters 2 Gram(s) Oral two times a day  senna 2 Tablet(s) Oral at bedtime    MEDICATIONS  (PRN):  acetaminophen     Tablet .. 650 milliGRAM(s) Oral every 6 hours PRN Temp greater or equal to 38C (100.4F), Mild Pain (1 - 3)  aluminum hydroxide/magnesium hydroxide/simethicone Suspension 30 milliLiter(s) Oral every 4 hours PRN Dyspepsia  dextrose Oral Gel 15 Gram(s) Oral once PRN Blood Glucose LESS THAN 70 milliGRAM(s)/deciliter  hydrALAZINE Injectable 10 milliGRAM(s) IV Push every 6 hours PRN If SBP > 160  melatonin 3 milliGRAM(s) Oral at bedtime PRN Insomnia  ondansetron Injectable 4 milliGRAM(s) IV Push every 8 hours PRN Nausea and/or Vomiting  polyethylene glycol 3350 17 Gram(s) Oral daily PRN Constipation      ALLERGIES:   Allergies    codeine (Unknown)  codeine (Vomiting)  Darvon (Unknown)  Darvon (Vomiting)  Flagyl (Diarrhea; Faint; Nausea)  Flagyl (Unknown)  morphine (Unknown)  morphine (Vomiting)    Intolerances        VITAL SIGNS:   Vital Signs Last 24 Hrs  T(C): 37 (15 Apr 2022 10:52), Max: 37.1 (14 Apr 2022 16:44)  T(F): 98.6 (15 Apr 2022 10:52), Max: 98.8 (14 Apr 2022 20:10)  HR: 64 (15 Apr 2022 10:52) (63 - 69)  BP: 144/71 (15 Apr 2022 10:52) (138/68 - 171/61)  BP(mean): --  RR: 18 (15 Apr 2022 10:52) (18 - 18)  SpO2: 92% (15 Apr 2022 10:52) (92% - 96%)  I&O's Summary      PHYSICAL EXAM:   GENERAL:  Elderly appearing female, in no acute distress  HEENT:  NC/AT,  conjunctivae clear, sclera -anicteric  CHEST:  Full & symmetric excursion, no increased effort, breath sounds clear  HEART:  Regular rhythm, S1, S2, no murmur/rub/S3/S4,  no edema  ABDOMEN:  Soft, non-tender, non-distended, normoactive bowel sounds.   EXTREMITIES: No cyanosis, clubbing or edema  SKIN:  No rash/erythema/ecchymoses/petechiae/wounds/abscess/warm/dry  NEURO:  Alert, oriented    LABS:  CBC Full  -  ( 15 Apr 2022 07:00 )  WBC Count : 5.73 K/uL  RBC Count : 3.76 M/uL  Hemoglobin : 11.5 g/dL  Hematocrit : 34.5 %  Platelet Count - Automated : 188 K/uL  Mean Cell Volume : 91.8 fl  Mean Cell Hemoglobin : 30.6 pg  Mean Cell Hemoglobin Concentration : 33.3 gm/dL  Auto Neutrophil # : x  Auto Lymphocyte # : x  Auto Monocyte # : x  Auto Eosinophil # : x  Auto Basophil # : x  Auto Neutrophil % : x  Auto Lymphocyte % : x  Auto Monocyte % : x  Auto Eosinophil % : x  Auto Basophil % : x    04-15    139  |  103  |  22.3<H>  ----------------------------<  132<H>  4.4   |  23.0  |  1.19    Ca    9.5      15 Apr 2022 07:00  Mg     1.8     04-14                RADIOLOGY & ADDITIONAL STUDIES (The following images were personally reviewed):    
GWENDOLYN SIMPSON  [id number]      Chief Complaint:  afib, hypertensive urgency    Interval History:  on NOAC, no events overnight    Tele:    Sinus    acetaminophen     Tablet .. 650 milliGRAM(s) Oral every 6 hours PRN  aluminum hydroxide/magnesium hydroxide/simethicone Suspension 30 milliLiter(s) Oral every 4 hours PRN  amLODIPine   Tablet 10 milliGRAM(s) Oral daily  apixaban 5 milliGRAM(s) Oral every 12 hours  atorvastatin 20 milliGRAM(s) Oral at bedtime  calcium carbonate 1250 mG  + Vitamin D (OsCal 500 + D) 1 Tablet(s) Oral two times a day  cyanocobalamin 1000 MICROGram(s) Oral daily  dextrose 5%. 1000 milliLiter(s) IV Continuous <Continuous>  dextrose 5%. 1000 milliLiter(s) IV Continuous <Continuous>  dextrose 50% Injectable 25 Gram(s) IV Push once  dextrose 50% Injectable 12.5 Gram(s) IV Push once  dextrose 50% Injectable 25 Gram(s) IV Push once  dextrose Oral Gel 15 Gram(s) Oral once PRN  glucagon  Injectable 1 milliGRAM(s) IntraMuscular once  hydrALAZINE 25 milliGRAM(s) Oral every 12 hours  hydrALAZINE Injectable 10 milliGRAM(s) IV Push every 6 hours PRN  hydrocortisone hemorrhoidal Suppository 1 Suppository(s) Rectal two times a day  insulin lispro (ADMELOG) corrective regimen sliding scale   SubCutaneous three times a day before meals  insulin lispro (ADMELOG) corrective regimen sliding scale   SubCutaneous at bedtime  isosorbide   mononitrate ER Tablet (IMDUR) 120 milliGRAM(s) Oral daily  levothyroxine 75 MICROGram(s) Oral daily  loratadine 10 milliGRAM(s) Oral at bedtime  losartan 100 milliGRAM(s) Oral daily  magnesium oxide 400 milliGRAM(s) Oral three times a day with meals  melatonin 3 milliGRAM(s) Oral at bedtime PRN  montelukast 10 milliGRAM(s) Oral at bedtime  multivitamin 1 Tablet(s) Oral daily  nebivolol 5 milliGRAM(s) Oral daily  omega-3-Acid Ethyl Esters 2 Gram(s) Oral two times a day  ondansetron Injectable 4 milliGRAM(s) IV Push every 8 hours PRN  polyethylene glycol 3350 17 Gram(s) Oral daily PRN  senna 2 Tablet(s) Oral at bedtime          Physical Exam:  T(C): 36.7 (04-15-22 @ 04:33), Max: 37.1 (04-14-22 @ 16:44)  HR: 63 (04-15-22 @ 04:33) (63 - 69)  BP: 138/68 (04-15-22 @ 04:33) (138/68 - 171/61)  RR: 18 (04-15-22 @ 04:33) (18 - 18)  SpO2: 96% (04-15-22 @ 04:33) (92% - 96%)  Wt(kg): --  General: Comfortable in NAD  Neck: No JVD  CVS: nl s1s2, no s3  Pulm: CTA b/l  Abd: soft, non-tender  Ext: No c/c/e  Neuro A&O x3  Psych: Normal affect      Labs:   15 Apr 2022 07:00    139    |  103    |  22.3   ----------------------------<  132    4.4     |  23.0   |  1.19     Ca    9.5        15 Apr 2022 07:00  Mg     1.8       14 Apr 2022 06:31                            11.5   5.73  )-----------( 188      ( 15 Apr 2022 07:00 )             34.5               Assessment:   81F PMHx CAD (CARLOS pRCA), HTN, HLD, cerebral aneurysm s/p coiling/clipping, migraines, hypothyroidism came to the ED complaining of nausea, vomiting and dizziness. Patient noted multiple episodes of non-bloody emesis, In the ED patient noted to have uncontrolled BP, telemetry with evidence of new onset AF.   -Now in SR and feeling better.  -H/H stable on Lovenox.  Has some mild BRBPR, ?hemorrhoids.  If ok per GI start Eliquis tomorrow.  -BP at goal      Plan:  1. Started on Eliquis.  For now no GI procedures planned.  2. Continue other current CV meds at current doses.  3. Continue off DAPT while on full dose A/C.  4. F/u echo.  5. Post echo results patient can be discharged home.     
Hypertensive urgency    HPI:  80 y/o female with PMH of cerebral aneurysm s/p coiling/clipping, migraines, HTN, HLD, hypothyroidism came to the ED complaining of nausea, vomiting and dizziness. Patient noted multiple episodes of non-bloody emesis that started this AM. She reported dizziness, 1 episode of loose BM and HA. She has no fever, chills, chest pain, shortness of breath, palpitation, blurry vision, abdominal pain, melena, hematuria, hematemesis, sick contact, recent travel.  (11 Apr 2022 21:22)    Interval History:  Patient was seen and examined at bedside around 8:30 am.  Woke her up from sleep.  Feels OK.   Denies chest pain, palpitations, shortness of breath, headache, dizziness, visual symptoms, nausea, vomiting or abdominal pain.  Had BM last night, no bleeding.     ROS:  As per interval history otherwise unremarkable.    PHYSICAL EXAM:  Vital Signs   T(C): 36.6 (13 Apr 2022 12:45), Max: 37.2 (13 Apr 2022 04:39)  T(F): 97.9 (13 Apr 2022 12:45), Max: 98.9 (13 Apr 2022 04:39)  HR: 67 (13 Apr 2022 12:45) (63 - 67)  BP: 148/68 (13 Apr 2022 12:45) (148/68 - 176/73)  BP(mean): 93 (12 Apr 2022 20:30) (93 - 93)  RR: 18 (13 Apr 2022 12:45) (17 - 18)  SpO2: 94% (13 Apr 2022 12:45) (94% - 95%)  General: Elderly female sitting in bed comfortably. No acute distress  HEENT: PERRLA. EOMI. Clear conjunctivae. Moist mucus membrane  Neck: Supple.   Chest: CTA bilaterally. No wheezing, rales or rhonchi.   Heart: Normal S1 & S2. RRR.   Abdomen: Soft. Non-tender. Non-distended. + BS  Ext: No pedal edema. No calf tenderness   Neuro: AAO x 3. No focal deficit. No speech disorder  Skin: Warm and Dry  Psychiatry: Normal mood and affect    LABS:  CAPILLARY BLOOD GLUCOSE  POCT Blood Glucose.: 151 mg/dL (13 Apr 2022 12:26)  POCT Blood Glucose.: 142 mg/dL (13 Apr 2022 08:14)  POCT Blood Glucose.: 166 mg/dL (12 Apr 2022 21:25)  POCT Blood Glucose.: 138 mg/dL (12 Apr 2022 17:31)                      10.7   8.69  )-----------( 172      ( 13 Apr 2022 05:58 )             31.8     04-13    137  |  103  |  26.7<H>  ----------------------------<  131<H>  4.6   |  22.0  |  1.45<H>    Ca    9.1      13 Apr 2022 05:58  Mg     1.8     04-13    TPro  6.0<L>  /  Alb  3.9  /  TBili  0.5  /  DBili  x   /  AST  16  /  ALT  15  /  AlkPhos  78  04-11    PT/INR - ( 11 Apr 2022 17:54 )   PT: 11.9 sec;   INR: 1.03 ratio      PTT - ( 11 Apr 2022 17:54 )  PTT:25.2 sec  CARDIAC MARKERS ( 11 Apr 2022 17:02 )  x     / <0.01 ng/mL / x     / x     / x        RADIOLOGY & ADDITIONAL STUDIES:  Reviewed     MEDICATIONS  (STANDING):  amLODIPine   Tablet 5 milliGRAM(s) Oral daily  atorvastatin 20 milliGRAM(s) Oral at bedtime  calcium carbonate 1250 mG  + Vitamin D (OsCal 500 + D) 1 Tablet(s) Oral two times a day  cyanocobalamin 1000 MICROGram(s) Oral daily  dextrose 5%. 1000 milliLiter(s) (50 mL/Hr) IV Continuous <Continuous>  dextrose 5%. 1000 milliLiter(s) (100 mL/Hr) IV Continuous <Continuous>  dextrose 50% Injectable 25 Gram(s) IV Push once  dextrose 50% Injectable 12.5 Gram(s) IV Push once  dextrose 50% Injectable 25 Gram(s) IV Push once  enoxaparin Injectable 80 milliGRAM(s) SubCutaneous every 12 hours  glucagon  Injectable 1 milliGRAM(s) IntraMuscular once  insulin lispro (ADMELOG) corrective regimen sliding scale   SubCutaneous three times a day before meals  insulin lispro (ADMELOG) corrective regimen sliding scale   SubCutaneous at bedtime  isosorbide   mononitrate ER Tablet (IMDUR) 120 milliGRAM(s) Oral daily  lactated ringers. 1000 milliLiter(s) (100 mL/Hr) IV Continuous <Continuous>  levothyroxine 75 MICROGram(s) Oral daily  loratadine 10 milliGRAM(s) Oral at bedtime  losartan 100 milliGRAM(s) Oral daily  montelukast 10 milliGRAM(s) Oral at bedtime  multivitamin 1 Tablet(s) Oral daily  nebivolol 5 milliGRAM(s) Oral daily  omega-3-Acid Ethyl Esters 2 Gram(s) Oral two times a day    MEDICATIONS  (PRN):  acetaminophen     Tablet .. 650 milliGRAM(s) Oral every 6 hours PRN Temp greater or equal to 38C (100.4F), Mild Pain (1 - 3)  aluminum hydroxide/magnesium hydroxide/simethicone Suspension 30 milliLiter(s) Oral every 4 hours PRN Dyspepsia  dextrose Oral Gel 15 Gram(s) Oral once PRN Blood Glucose LESS THAN 70 milliGRAM(s)/deciliter  hydrALAZINE Injectable 10 milliGRAM(s) IV Push every 6 hours PRN If SBP > 160  melatonin 3 milliGRAM(s) Oral at bedtime PRN Insomnia  ondansetron Injectable 4 milliGRAM(s) IV Push every 8 hours PRN Nausea and/or Vomiting        
Hypertensive urgency    HPI:  80 y/o female with PMH of cerebral aneurysm s/p coiling/clipping, migraines, HTN, HLD, hypothyroidism came to the ED complaining of nausea, vomiting and dizziness. Patient noted multiple episodes of non-bloody emesis that started this AM. She reported dizziness, 1 episode of loose BM and HA. She has no fever, chills, chest pain, shortness of breath, palpitation, blurry vision, abdominal pain, melena, hematuria, hematemesis, sick contact, recent travel.  (11 Apr 2022 21:22)    Interval History:  Patient was seen and examined at bedside couple of times during the day.  Denies chest pain, palpitations, shortness of breath, headache, dizziness, visual symptoms, nausea, vomiting or abdominal pain.  Tolerating PO.   Ambulating without difficulty.     ROS:  As per interval history otherwise unremarkable.    PHYSICAL EXAM:  Vital Signs   T(C): 36.8 (12 Apr 2022 11:05), Max: 36.9 (12 Apr 2022 00:07)  T(F): 98.2 (12 Apr 2022 11:05), Max: 98.4 (12 Apr 2022 00:07)  HR: 73 (12 Apr 2022 11:05) (48 - 83)  BP: 177/66 (12 Apr 2022 11:05) (134/57 - 207/74)  BP(mean): 100 (11 Apr 2022 19:23) (100 - 100)  RR: 18 (12 Apr 2022 11:05) (18 - 20)  SpO2: 96% (12 Apr 2022 11:05) (95% - 100%)  General: Elderly female sitting in bed comfortably. No acute distress  HEENT: PERRLA. EOMI. Clear conjunctivae. Moist mucus membrane  Neck: Supple.   Chest: CTA bilaterally. No wheezing, rales or rhonchi.   Heart: Normal S1 & S2. RRR.   Abdomen: Soft. Non-tender. Non-distended. + BS  Ext: No pedal edema. No calf tenderness   Neuro: AAO x 3. No focal deficit. No speech disorder  Skin: Warm and Dry  Psychiatry: Normal mood and affect    LABS:  CAPILLARY BLOOD GLUCOSE  POCT Blood Glucose.: 130 mg/dL (12 Apr 2022 12:55)  POCT Blood Glucose.: 143 mg/dL (12 Apr 2022 08:36)                      10.9   9.37  )-----------( 183      ( 12 Apr 2022 03:45 )             31.5     04-12    141  |  107  |  18.7  ----------------------------<  135<H>  4.3   |  20.0<L>  |  1.11    Ca    8.8      12 Apr 2022 03:45  Mg     2.4     04-12    TPro  6.0<L>  /  Alb  3.9  /  TBili  0.5  /  DBili  x   /  AST  16  /  ALT  15  /  AlkPhos  78  04-11    PT/INR - ( 11 Apr 2022 17:54 )   PT: 11.9 sec;   INR: 1.03 ratio      PTT - ( 11 Apr 2022 17:54 )  PTT:25.2 sec    RADIOLOGY & ADDITIONAL STUDIES:  Reviewed     MEDICATIONS  (STANDING):  atorvastatin 20 milliGRAM(s) Oral at bedtime  calcium carbonate 1250 mG  + Vitamin D (OsCal 500 + D) 1 Tablet(s) Oral two times a day  cyanocobalamin 1000 MICROGram(s) Oral daily  dextrose 5%. 1000 milliLiter(s) (50 mL/Hr) IV Continuous <Continuous>  dextrose 5%. 1000 milliLiter(s) (100 mL/Hr) IV Continuous <Continuous>  dextrose 50% Injectable 25 Gram(s) IV Push once  dextrose 50% Injectable 12.5 Gram(s) IV Push once  dextrose 50% Injectable 25 Gram(s) IV Push once  enoxaparin Injectable 80 milliGRAM(s) SubCutaneous every 12 hours  glucagon  Injectable 1 milliGRAM(s) IntraMuscular once  insulin lispro (ADMELOG) corrective regimen sliding scale   SubCutaneous three times a day before meals  insulin lispro (ADMELOG) corrective regimen sliding scale   SubCutaneous at bedtime  isosorbide   mononitrate ER Tablet (IMDUR) 60 milliGRAM(s) Oral daily  levothyroxine 75 MICROGram(s) Oral daily  loratadine 10 milliGRAM(s) Oral at bedtime  losartan 100 milliGRAM(s) Oral daily  montelukast 10 milliGRAM(s) Oral at bedtime  multivitamin 1 Tablet(s) Oral daily  nebivolol 5 milliGRAM(s) Oral daily  omega-3-Acid Ethyl Esters 2 Gram(s) Oral two times a day    MEDICATIONS  (PRN):  acetaminophen     Tablet .. 650 milliGRAM(s) Oral every 6 hours PRN Temp greater or equal to 38C (100.4F), Mild Pain (1 - 3)  aluminum hydroxide/magnesium hydroxide/simethicone Suspension 30 milliLiter(s) Oral every 4 hours PRN Dyspepsia  dextrose Oral Gel 15 Gram(s) Oral once PRN Blood Glucose LESS THAN 70 milliGRAM(s)/deciliter  hydrALAZINE Injectable 10 milliGRAM(s) IV Push every 6 hours PRN If SBP > 160  melatonin 3 milliGRAM(s) Oral at bedtime PRN Insomnia  ondansetron Injectable 4 milliGRAM(s) IV Push every 8 hours PRN Nausea and/or Vomiting      
Hypertensive urgency    HPI:  82 y/o female with PMH of cerebral aneurysm s/p coiling/clipping, migraines, HTN, HLD, hypothyroidism came to the ED complaining of nausea, vomiting and dizziness. Patient noted multiple episodes of non-bloody emesis that started this AM. She reported dizziness, 1 episode of loose BM and HA. She has no fever, chills, chest pain, shortness of breath, palpitation, blurry vision, abdominal pain, melena, hematuria, hematemesis, sick contact, recent travel.  (11 Apr 2022 21:22)    Interval History:  Patient was seen and examined at bedside around 9:45 am.  Doing well.   Denies chest pain, palpitations, shortness of breath, headache, dizziness, visual symptoms, nausea, vomiting or abdominal pain.  No BM since yesterday.     ROS:  As per interval history otherwise unremarkable.    PHYSICAL EXAM:  Vital Signs   T(C): 37.1 (14 Apr 2022 16:44), Max: 37.1 (14 Apr 2022 16:44)  T(F): 98.7 (14 Apr 2022 16:44), Max: 98.7 (14 Apr 2022 16:44)  HR: 65 (14 Apr 2022 16:44) (62 - 67)  BP: 146/66 (14 Apr 2022 16:44) (146/66 - 165/71)  RR: 18 (14 Apr 2022 16:44) (18 - 18)  SpO2: 92% (14 Apr 2022 16:44) (91% - 95%)  General: Elderly female sitting in bed comfortably. No acute distress  HEENT: PERRLA. EOMI. Clear conjunctivae. Moist mucus membrane  Neck: Supple.   Chest: CTA bilaterally. No wheezing, rales or rhonchi.   Heart: Normal S1 & S2. RRR.   Abdomen: Soft. Non-tender. Non-distended. + BS  Ext: No pedal edema. No calf tenderness   Neuro: AAO x 3. No focal deficit. No speech disorder  Skin: Warm and Dry  Psychiatry: Normal mood and affect    LABS:  CAPILLARY BLOOD GLUCOSE  POCT Blood Glucose.: 122 mg/dL (14 Apr 2022 12:19)  POCT Blood Glucose.: 129 mg/dL (14 Apr 2022 08:15)  POCT Blood Glucose.: 157 mg/dL (13 Apr 2022 21:35)  POCT Blood Glucose.: 123 mg/dL (13 Apr 2022 17:26)                        10.8   6.44  )-----------( 163      ( 14 Apr 2022 06:31 )             32.0     04-14    141  |  105  |  23.1<H>  ----------------------------<  130<H>  4.5   |  20.0<L>  |  1.36<H>    Ca    9.3      14 Apr 2022 06:31  Mg     1.8     04-14    RADIOLOGY & ADDITIONAL STUDIES:  Reviewed     MEDICATIONS  (STANDING):  apixaban 5 milliGRAM(s) Oral every 12 hours  atorvastatin 20 milliGRAM(s) Oral at bedtime  calcium carbonate 1250 mG  + Vitamin D (OsCal 500 + D) 1 Tablet(s) Oral two times a day  cyanocobalamin 1000 MICROGram(s) Oral daily  dextrose 5%. 1000 milliLiter(s) (50 mL/Hr) IV Continuous <Continuous>  dextrose 5%. 1000 milliLiter(s) (100 mL/Hr) IV Continuous <Continuous>  dextrose 50% Injectable 25 Gram(s) IV Push once  dextrose 50% Injectable 12.5 Gram(s) IV Push once  dextrose 50% Injectable 25 Gram(s) IV Push once  glucagon  Injectable 1 milliGRAM(s) IntraMuscular once  hydrALAZINE 10 milliGRAM(s) Oral every 8 hours  hydrocortisone hemorrhoidal Suppository 1 Suppository(s) Rectal two times a day  insulin lispro (ADMELOG) corrective regimen sliding scale   SubCutaneous three times a day before meals  insulin lispro (ADMELOG) corrective regimen sliding scale   SubCutaneous at bedtime  isosorbide   mononitrate ER Tablet (IMDUR) 120 milliGRAM(s) Oral daily  levothyroxine 75 MICROGram(s) Oral daily  loratadine 10 milliGRAM(s) Oral at bedtime  losartan 100 milliGRAM(s) Oral daily  magnesium oxide 400 milliGRAM(s) Oral three times a day with meals  montelukast 10 milliGRAM(s) Oral at bedtime  multivitamin 1 Tablet(s) Oral daily  nebivolol 5 milliGRAM(s) Oral daily  omega-3-Acid Ethyl Esters 2 Gram(s) Oral two times a day  senna 2 Tablet(s) Oral at bedtime    MEDICATIONS  (PRN):  acetaminophen     Tablet .. 650 milliGRAM(s) Oral every 6 hours PRN Temp greater or equal to 38C (100.4F), Mild Pain (1 - 3)  aluminum hydroxide/magnesium hydroxide/simethicone Suspension 30 milliLiter(s) Oral every 4 hours PRN Dyspepsia  dextrose Oral Gel 15 Gram(s) Oral once PRN Blood Glucose LESS THAN 70 milliGRAM(s)/deciliter  hydrALAZINE Injectable 10 milliGRAM(s) IV Push every 6 hours PRN If SBP > 160  melatonin 3 milliGRAM(s) Oral at bedtime PRN Insomnia  ondansetron Injectable 4 milliGRAM(s) IV Push every 8 hours PRN Nausea and/or Vomiting  polyethylene glycol 3350 17 Gram(s) Oral daily PRN Constipation

## 2022-04-15 NOTE — DISCHARGE NOTE PROVIDER - HOSPITAL COURSE
81 year old female with history of Cerebral Aneurysms s/p Coiling/Clipping, Migraine, CAD, HTN, HLD, Prediabetes and Hypothyroidism presented with nausea, vomiting and dizziness. In the ED, found to have elevated BP (SBP >200), bradycardia. CT head, CT head/neck with IV: no acute finding. Started on Nicardipine for BP controlled initially and then weaned off. MICU was consulted. ECG showed A. Fib - no prior history. Admitted with Hypertensive Urgency. She was continued on Losartan and Bystolic. Imdur, Amlodipine and Hydralazine were added. Her BP is better controlled now. For new onset Paroxysmal A. Fib, cardiology recommended AC however given her history of rectal bleed, GI was consulted.   It looks like rectal bleed is secondary to hemorrhoids. GI recommended to start Lovenox while she is here and monitor closely. If H&H remains stable then switch to NOACs otherwise she will need Colonoscopy (she was on Plavix at the time of admission and had to be off for 4 days prior to Colonoscopy). Her H&H remained stable and there was no bleeding while she was on FD Lovenox so it was switched to Eliquis. Cardiology recommended to continue only Eliquis and d/c DAPT (stent was in 2017).   During hospitalization, she was found to have new onset DM 2, will be discharged on Metformin.   She was also treated for DORINA with IVF.   Her symptoms have improved and she is stable for discharge.

## 2022-04-15 NOTE — DISCHARGE NOTE NURSING/CASE MANAGEMENT/SOCIAL WORK - PATIENT PORTAL LINK FT
You can access the FollowMyHealth Patient Portal offered by [facility] by registering at the following website: [email or web address]. By joining [facility]’s FollowMyHealth portal, you will also be able to view your health information using other applications (apps) compatible with our system.

## 2022-04-15 NOTE — DISCHARGE NOTE PROVIDER - ATTENDING DISCHARGE PHYSICAL EXAMINATION:
Vital Signs   T(C): 37 (15 Apr 2022 10:52), Max: 37.1 (14 Apr 2022 16:44)  T(F): 98.6 (15 Apr 2022 10:52), Max: 98.8 (14 Apr 2022 20:10)  HR: 64 (15 Apr 2022 10:52) (63 - 69)  BP: 144/71 (15 Apr 2022 10:52) (138/68 - 171/61)  RR: 18 (15 Apr 2022 10:52) (18 - 18)  SpO2: 92% (15 Apr 2022 10:52) (92% - 96%)  General: Elderly female sitting in bed comfortably. No acute distress  HEENT: PERRLA. EOMI. Clear conjunctivae. Moist mucus membrane  Neck: Supple.   Chest: CTA bilaterally. No wheezing, rales or rhonchi.   Heart: Normal S1 & S2. RRR.   Abdomen: Soft. Non-tender. Non-distended. + BS  Ext: No pedal edema. No calf tenderness   Neuro: AAO x 3. No focal deficit. No speech disorder  Skin: Warm and Dry  Psychiatry: Normal mood and affect

## 2022-04-15 NOTE — DISCHARGE NOTE PROVIDER - CARE PROVIDER_API CALL
Marcus Jane)  Cardiovascular Disease; Internal Medicine  [address] [place] [address]  Phone: [phone]  Fax: [phone]  Established Patient  Follow Up Time: 1 week    Gastroenterologist,   Phone: (   )    -  Fax: (   )    -  Follow Up Time: 2 weeks    ORIN FORTUNE  Advanced Practice Midwife  [address] [place] [address]  Phone: ([phone]  Fax: ()-  Established Patient  Follow Up Time: 1 week

## 2022-04-15 NOTE — DISCHARGE NOTE PROVIDER - PROVIDER TOKENS
PROVIDER:[TOKEN:[[id number]],FOLLOWUP:[1 week],ESTABLISHEDPATIENT:[T]],FREE:[LAST:[Gastroenterologist],PHONE:[(   )    -],FAX:[(   )    -],FOLLOWUP:[2 weeks]],PROVIDER:[TOKEN:[[id number]],FOLLOWUP:[1 week],ESTABLISHEDPATIENT:[T]]

## 2022-04-15 NOTE — PROGRESS NOTE ADULT - REASON FOR ADMISSION
Hypertensive urgency

## 2022-04-15 NOTE — DISCHARGE NOTE PROVIDER - NSDCMRMEDTOKEN_GEN_ALL_CORE_FT
amLODIPine 10 mg oral tablet: 1 tab(s) orally once a day  apixaban 5 mg oral tablet: 1 tab(s) orally every 12 hours  atorvastatin 20 mg oral tablet: 1 tab(s) orally once a day  Bystolic 5 mg oral tablet: 1 tab(s) orally once a day  Calcium 600+D 600 mg-200 units oral tablet: 1 tab(s) orally 2 times a day  cyanocobalamin 1000 mcg oral tablet: 1 tab(s) orally once a day  hydrALAZINE 25 mg oral tablet: 1 tab(s) orally every 12 hours  hydrocortisone 25 mg rectal suppository: 1 suppository(ies) rectal 2 times a day   isosorbide mononitrate 120 mg oral tablet, extended release: 1 tab(s) orally once a day  levocetirizine 5 mg oral tablet: 1 tab(s) orally once a day (in the evening)  levothyroxine 75 mcg (0.075 mg) oral tablet: 1 tab(s) orally once a day  losartan 50 mg oral tablet: 2 tab(s) orally once a day  metFORMIN 500 mg oral tablet: 1 tab(s) orally 2 times a day   montelukast 10 mg oral tablet: 1 tab(s) orally once a day (in the evening)  Multiple Vitamins oral tablet: 1 tab(s) orally once a day  omega-3 polyunsaturated fatty acids 1000 mg oral capsule: 2 capsules in the morning and 1 capsule in the evening  polyethylene glycol 3350 oral powder for reconstitution: 17 gram(s) orally once a day, As needed, Constipation  Probiotic Formula (Bacillus Coagulans) oral capsule: 1 cap(s) orally once a day  senna oral tablet: 2 tab(s) orally once a day (at bedtime)

## 2022-04-15 NOTE — DISCHARGE NOTE PROVIDER - CARE PROVIDERS DIRECT ADDRESSES
[email or web address],DirectAddress_Unknown

## 2022-04-15 NOTE — PROGRESS NOTE ADULT - PROVIDER SPECIALTY LIST ADULT
Cardiology
Hospitalist
Cardiology
Cardiology
Hospitalist
Gastroenterology
Hospitalist
Gastroenterology
Gastroenterology

## 2022-04-15 NOTE — DISCHARGE NOTE NURSING/CASE MANAGEMENT/SOCIAL WORK - NSDCPEFALRISK_GEN_ALL_CORE
For information on Fall & Injury Prevention, visit: [email or web address] OR  [email or web address] OR  https://www.cdc.gov/steadi/patient.html

## 2022-04-15 NOTE — CHART NOTE - NSCHARTNOTEFT_GEN_A_CORE
Contacted by RN for pt with abdominal distension   Patient seen and examined at bedside, daughter present   Pt concerned that abd is distended to the point where she cannot button the jeans that she arrived in   Reports constipation throughout hospital stay but is now having BMS, formed   Denies liquid stool, n/v/d, abd pain   No other complaints at this time     Vital Signs  T(C): 36.7 (04-15-22 @ 16:22), Max: 37.1 (04-14-22 @ 20:10)  HR: 66 (04-15-22 @ 16:22) (63 - 69)  BP: 126/64 (04-15-22 @ 16:22) (126/64 - 171/61)  RR: 18 (04-15-22 @ 16:22) (18 - 18)  SpO2: 94% (04-15-22 @ 16:22) (92% - 96%)    Physical Exam:  Gen: AOx3, sitting in bed comfortably in NAD  Abd: +bowel sounds, soft/nt/mild-mod distension   Ext: MAEx4, without edema    A/P: constipation  explained that distension is likely due to constipation, patient reports decreased mobility since being in the hospital   low suspicion for ileus/SBO as pt with +BS and moving bowels, no n/v/d, no s/sx HF, ascites   echo performed today, results noted   continue bowel regimen at home, discharge this evening   counseled pt and daughter on the importance of mobility and warning signs to return to ED (increasing distension, not passing any stool, abd pain, etc)   pt and daughter agree that they feel comfortable for pt to return home tonight and they understand warning signs that should prompt medical intervention - shared medical decision making between the three of us   d/w Dr. Reyes

## 2022-04-15 NOTE — PROGRESS NOTE ADULT - PROBLEM SELECTOR PLAN 1
The patient with intermittent rectal bleeding was on aspirin and Plavix, possibly hemorrhoidal bleed. Plavix and Aspirin now on hold and started on therapeutic dose of Lovenox. Hemoglobin remain stable at 11.5. No GI complaints. No BMs reported.     Continue Protonix for cytoprotection.   Diet as tolerated.   Continue Anusol suppository BID, Miralax, Senna.  No further GI interventions at this time. Can F/u outpatient with her Gastroenterologist. GI will sign off.

## 2022-04-15 NOTE — PROGRESS NOTE ADULT - NS ATTEND AMEND GEN_ALL_CORE FT
82 y/o female with PMH of cerebral aneurysm s/p coiling/clipping, migraines, HTN, HLD, hypothyroidism came to the ED complaining of nausea, vomiting and dizziness, currently being treated for hypertensive emergency, GI following for rectal bleeding. Hb stable, no hematochezia/melena, hb stable, tolerating po, outpatient f/u with her GI after discharge, GI will sign off, please contact if further questions/concerns    notes/labs/imaging reviewed, plan discussed with NP
Feels well without any abdominal pain, nausea or vomiting.  She has not had a bowel movement since yesterday but felt some moisture in the perianal region and wiped at which time she was noted some scant blood on the tissue which is probably hemorrhoidal.  Will treat with suppositories and MiraLAX.  In view of her age and likelihood of hemorrhoidal bleeding will defer colonoscopy at this time.
Patient with intermittent rectal bleeding. NO further rectal bleeding  Pt seen and examined with NP  Off ASA and Plavix. Plan is to give  lovenox and observe for bleeding. If no bleeding, cardiology will start Elaquis. If hemoglobin drops, then will do EGD, colon monday ( off anticoagulation )

## 2022-04-15 NOTE — DISCHARGE NOTE PROVIDER - NSDCCPCAREPLAN_GEN_ALL_CORE_FT
PRINCIPAL DISCHARGE DIAGNOSIS  Diagnosis: Hypertensive urgency  Assessment and Plan of Treatment: Continue medications as prescribed.  Follow up with Cardio in 1 week.      SECONDARY DISCHARGE DIAGNOSES  Diagnosis: New onset atrial fibrillation  Assessment and Plan of Treatment: Paroxysmal A. Fib.  Continue medications as prescribed.  Follow up with Cardio in 1 week.

## 2022-04-17 ENCOUNTER — EMERGENCY (EMERGENCY)
Facility: HOSPITAL | Age: 82
LOS: 1 days | Discharge: HOME | End: 2022-04-17
Attending: EMERGENCY MEDICINE
Payer: MEDICARE

## 2022-04-17 VITALS
SYSTOLIC BLOOD PRESSURE: 131 MMHG | RESPIRATION RATE: 17 BRPM | HEART RATE: 65 BPM | OXYGEN SATURATION: 94 % | TEMPERATURE: 98 F | DIASTOLIC BLOOD PRESSURE: 55 MMHG

## 2022-04-17 VITALS
OXYGEN SATURATION: 99 % | DIASTOLIC BLOOD PRESSURE: 64 MMHG | HEIGHT: 67 IN | WEIGHT: 169.98 LBS | SYSTOLIC BLOOD PRESSURE: 127 MMHG | RESPIRATION RATE: 18 BRPM | HEART RATE: 66 BPM | TEMPERATURE: 98 F

## 2022-04-17 DIAGNOSIS — Z98.89 OTHER SPECIFIED POSTPROCEDURAL STATES: Chronic | ICD-10-CM

## 2022-04-17 DIAGNOSIS — H26.9 UNSPECIFIED CATARACT: Chronic | ICD-10-CM

## 2022-04-17 DIAGNOSIS — Z90.710 ACQUIRED ABSENCE OF BOTH CERVIX AND UTERUS: Chronic | ICD-10-CM

## 2022-04-17 LAB
ALBUMIN SERPL ELPH-MCNC: 4.6 G/DL — SIGNIFICANT CHANGE UP (ref 3.3–5.2)
ALP SERPL-CCNC: 84 U/L — SIGNIFICANT CHANGE UP (ref 40–120)
ALT FLD-CCNC: 88 U/L — HIGH
ANION GAP SERPL CALC-SCNC: 17 MMOL/L — SIGNIFICANT CHANGE UP (ref 5–17)
APPEARANCE UR: CLEAR — SIGNIFICANT CHANGE UP
APTT BLD: 31.9 SEC — SIGNIFICANT CHANGE UP (ref 27.5–35.5)
AST SERPL-CCNC: 65 U/L — HIGH
BASOPHILS # BLD AUTO: 0.05 K/UL — SIGNIFICANT CHANGE UP (ref 0–0.2)
BASOPHILS NFR BLD AUTO: 0.4 % — SIGNIFICANT CHANGE UP (ref 0–2)
BILIRUB SERPL-MCNC: 0.4 MG/DL — SIGNIFICANT CHANGE UP (ref 0.4–2)
BILIRUB UR-MCNC: NEGATIVE — SIGNIFICANT CHANGE UP
BUN SERPL-MCNC: 24.9 MG/DL — HIGH (ref 8–20)
CALCIUM SERPL-MCNC: 10.3 MG/DL — HIGH (ref 8.6–10.2)
CHLORIDE SERPL-SCNC: 97 MMOL/L — LOW (ref 98–107)
CO2 SERPL-SCNC: 23 MMOL/L — SIGNIFICANT CHANGE UP (ref 22–29)
COLOR SPEC: YELLOW — SIGNIFICANT CHANGE UP
CREAT SERPL-MCNC: 1.23 MG/DL — SIGNIFICANT CHANGE UP (ref 0.5–1.3)
DIFF PNL FLD: NEGATIVE — SIGNIFICANT CHANGE UP
EGFR: 44 ML/MIN/1.73M2 — LOW
EOSINOPHIL # BLD AUTO: 0.05 K/UL — SIGNIFICANT CHANGE UP (ref 0–0.5)
EOSINOPHIL NFR BLD AUTO: 0.4 % — SIGNIFICANT CHANGE UP (ref 0–6)
GLUCOSE SERPL-MCNC: 158 MG/DL — HIGH (ref 70–99)
GLUCOSE UR QL: NEGATIVE MG/DL — SIGNIFICANT CHANGE UP
HCT VFR BLD CALC: 35.5 % — SIGNIFICANT CHANGE UP (ref 34.5–45)
HGB BLD-MCNC: 12 G/DL — SIGNIFICANT CHANGE UP (ref 11.5–15.5)
IMM GRANULOCYTES NFR BLD AUTO: 0.4 % — SIGNIFICANT CHANGE UP (ref 0–1.5)
INR BLD: 1.51 RATIO — HIGH (ref 0.88–1.16)
KETONES UR-MCNC: NEGATIVE — SIGNIFICANT CHANGE UP
LEUKOCYTE ESTERASE UR-ACNC: NEGATIVE — SIGNIFICANT CHANGE UP
LYMPHOCYTES # BLD AUTO: 1.36 K/UL — SIGNIFICANT CHANGE UP (ref 1–3.3)
LYMPHOCYTES # BLD AUTO: 11.5 % — LOW (ref 13–44)
MAGNESIUM SERPL-MCNC: 2.1 MG/DL — SIGNIFICANT CHANGE UP (ref 1.8–2.6)
MCHC RBC-ENTMCNC: 29.9 PG — SIGNIFICANT CHANGE UP (ref 27–34)
MCHC RBC-ENTMCNC: 33.8 GM/DL — SIGNIFICANT CHANGE UP (ref 32–36)
MCV RBC AUTO: 88.5 FL — SIGNIFICANT CHANGE UP (ref 80–100)
MONOCYTES # BLD AUTO: 0.57 K/UL — SIGNIFICANT CHANGE UP (ref 0–0.9)
MONOCYTES NFR BLD AUTO: 4.8 % — SIGNIFICANT CHANGE UP (ref 2–14)
NEUTROPHILS # BLD AUTO: 9.75 K/UL — HIGH (ref 1.8–7.4)
NEUTROPHILS NFR BLD AUTO: 82.5 % — HIGH (ref 43–77)
NITRITE UR-MCNC: NEGATIVE — SIGNIFICANT CHANGE UP
NT-PROBNP SERPL-SCNC: 278 PG/ML — SIGNIFICANT CHANGE UP (ref 0–300)
PH UR: 9 — HIGH (ref 5–8)
PLATELET # BLD AUTO: 271 K/UL — SIGNIFICANT CHANGE UP (ref 150–400)
POTASSIUM SERPL-MCNC: 4.2 MMOL/L — SIGNIFICANT CHANGE UP (ref 3.5–5.3)
POTASSIUM SERPL-SCNC: 4.2 MMOL/L — SIGNIFICANT CHANGE UP (ref 3.5–5.3)
PROT SERPL-MCNC: 7.6 G/DL — SIGNIFICANT CHANGE UP (ref 6.6–8.7)
PROT UR-MCNC: NEGATIVE — SIGNIFICANT CHANGE UP
PROTHROM AB SERPL-ACNC: 17.6 SEC — HIGH (ref 10.5–13.4)
RBC # BLD: 4.01 M/UL — SIGNIFICANT CHANGE UP (ref 3.8–5.2)
RBC # FLD: 12.6 % — SIGNIFICANT CHANGE UP (ref 10.3–14.5)
SODIUM SERPL-SCNC: 137 MMOL/L — SIGNIFICANT CHANGE UP (ref 135–145)
SP GR SPEC: 1.01 — SIGNIFICANT CHANGE UP (ref 1.01–1.02)
T3 SERPL-MCNC: 80 NG/DL — SIGNIFICANT CHANGE UP (ref 80–200)
T4 AB SER-ACNC: 8.5 UG/DL — SIGNIFICANT CHANGE UP (ref 4.5–12)
TROPONIN T SERPL-MCNC: <0.01 NG/ML — SIGNIFICANT CHANGE UP (ref 0–0.06)
TSH SERPL-MCNC: 5.03 UIU/ML — HIGH (ref 0.27–4.2)
UROBILINOGEN FLD QL: NEGATIVE MG/DL — SIGNIFICANT CHANGE UP
WBC # BLD: 11.83 K/UL — HIGH (ref 3.8–10.5)
WBC # FLD AUTO: 11.83 K/UL — HIGH (ref 3.8–10.5)

## 2022-04-17 PROCEDURE — G1004: CPT

## 2022-04-17 PROCEDURE — 84484 ASSAY OF TROPONIN QUANT: CPT

## 2022-04-17 PROCEDURE — 83735 ASSAY OF MAGNESIUM: CPT

## 2022-04-17 PROCEDURE — 87086 URINE CULTURE/COLONY COUNT: CPT

## 2022-04-17 PROCEDURE — 71045 X-RAY EXAM CHEST 1 VIEW: CPT

## 2022-04-17 PROCEDURE — 85730 THROMBOPLASTIN TIME PARTIAL: CPT

## 2022-04-17 PROCEDURE — 84443 ASSAY THYROID STIM HORMONE: CPT

## 2022-04-17 PROCEDURE — 84436 ASSAY OF TOTAL THYROXINE: CPT

## 2022-04-17 PROCEDURE — 96375 TX/PRO/DX INJ NEW DRUG ADDON: CPT

## 2022-04-17 PROCEDURE — 81003 URINALYSIS AUTO W/O SCOPE: CPT

## 2022-04-17 PROCEDURE — 36415 COLL VENOUS BLD VENIPUNCTURE: CPT

## 2022-04-17 PROCEDURE — 96374 THER/PROPH/DIAG INJ IV PUSH: CPT | Mod: XU

## 2022-04-17 PROCEDURE — 84480 ASSAY TRIIODOTHYRONINE (T3): CPT

## 2022-04-17 PROCEDURE — 93005 ELECTROCARDIOGRAM TRACING: CPT

## 2022-04-17 PROCEDURE — 99285 EMERGENCY DEPT VISIT HI MDM: CPT | Mod: 25

## 2022-04-17 PROCEDURE — 80053 COMPREHEN METABOLIC PANEL: CPT

## 2022-04-17 PROCEDURE — 82962 GLUCOSE BLOOD TEST: CPT

## 2022-04-17 PROCEDURE — 85610 PROTHROMBIN TIME: CPT

## 2022-04-17 PROCEDURE — 74177 CT ABD & PELVIS W/CONTRAST: CPT | Mod: 26,MG

## 2022-04-17 PROCEDURE — 83880 ASSAY OF NATRIURETIC PEPTIDE: CPT

## 2022-04-17 PROCEDURE — 71045 X-RAY EXAM CHEST 1 VIEW: CPT | Mod: 26

## 2022-04-17 PROCEDURE — 74177 CT ABD & PELVIS W/CONTRAST: CPT | Mod: MG

## 2022-04-17 PROCEDURE — 83690 ASSAY OF LIPASE: CPT

## 2022-04-17 PROCEDURE — 99285 EMERGENCY DEPT VISIT HI MDM: CPT

## 2022-04-17 PROCEDURE — 93010 ELECTROCARDIOGRAM REPORT: CPT

## 2022-04-17 PROCEDURE — 85025 COMPLETE CBC W/AUTO DIFF WBC: CPT

## 2022-04-17 RX ORDER — IOHEXOL 300 MG/ML
30 INJECTION, SOLUTION INTRAVENOUS ONCE
Refills: 0 | Status: COMPLETED | OUTPATIENT
Start: 2022-04-17 | End: 2022-04-17

## 2022-04-17 RX ORDER — PANTOPRAZOLE SODIUM 20 MG/1
1 TABLET, DELAYED RELEASE ORAL
Qty: 30 | Refills: 0
Start: 2022-04-17 | End: 2022-05-16

## 2022-04-17 RX ORDER — ONDANSETRON 8 MG/1
1 TABLET, FILM COATED ORAL
Qty: 21 | Refills: 0
Start: 2022-04-17 | End: 2022-04-23

## 2022-04-17 RX ORDER — SODIUM CHLORIDE 9 MG/ML
1000 INJECTION INTRAMUSCULAR; INTRAVENOUS; SUBCUTANEOUS ONCE
Refills: 0 | Status: COMPLETED | OUTPATIENT
Start: 2022-04-17 | End: 2022-04-17

## 2022-04-17 RX ORDER — FAMOTIDINE 10 MG/ML
20 INJECTION INTRAVENOUS ONCE
Refills: 0 | Status: COMPLETED | OUTPATIENT
Start: 2022-04-17 | End: 2022-04-17

## 2022-04-17 RX ORDER — ONDANSETRON 8 MG/1
4 TABLET, FILM COATED ORAL ONCE
Refills: 0 | Status: COMPLETED | OUTPATIENT
Start: 2022-04-17 | End: 2022-04-17

## 2022-04-17 RX ADMIN — SODIUM CHLORIDE 1000 MILLILITER(S): 9 INJECTION INTRAMUSCULAR; INTRAVENOUS; SUBCUTANEOUS at 15:27

## 2022-04-17 RX ADMIN — ONDANSETRON 4 MILLIGRAM(S): 8 TABLET, FILM COATED ORAL at 15:27

## 2022-04-17 RX ADMIN — FAMOTIDINE 20 MILLIGRAM(S): 10 INJECTION INTRAVENOUS at 15:27

## 2022-04-17 RX ADMIN — IOHEXOL 30 MILLILITER(S): 300 INJECTION, SOLUTION INTRAVENOUS at 17:30

## 2022-04-17 NOTE — ED PROVIDER NOTE - NS ED ROS FT
Review of Systems  •	CONSTITUTIONAL - no  fever, no diaphoresis, no weight change  •	SKIN - no rash  •	HEMATOLOGIC - no bleeding, no bruising  •	EYES - no eye pain, no blurred vision  •	ENT - no change in hearing, no pain  •	RESPIRATORY - no shortness of breath, no cough  •	CARDIAC - no chest pain, no palpitations  •	GI - no abd pain, (+) nausea, (+)  vomiting, no diarrhea, no constipation, no bleeding  •	GENITO-URINARY - no discharge, no dysuria; no hematuria,   •	ENDO - no polydipsia, no polyuria, no heat/no cold intolerance  •	MUSCULOSKELETAL - no joint pain, no swelling, no redness  •	NEUROLOGIC - no weakness, no headache, no anesthesia, no paresthesias  •	PSYCH - no anxiety, non suicidal, non homicidal, no hallucination, no depression

## 2022-04-17 NOTE — ED ADULT NURSE NOTE - OBJECTIVE STATEMENT
pt care assumed at 1430, no apparent distress noted at this time.pt received A&OX3 sitting in bed with daughter at bedside. pt c/o fatigue and vomiting since last night. pt states she was recently d/c from [facility] for "the same issue." HR is regular, lung sounds are clear b/l, abd is soft and nontender with positive bowel sounds in all four quadrants, skin is warm, dry and appropriate for age and race.

## 2022-04-17 NOTE — ED PROVIDER NOTE - PATIENT PORTAL LINK FT
You can access the FollowMyHealth Patient Portal offered by [facility] by registering at the following website: [email or web address]. By joining [facility]’s FollowMyHealth portal, you will also be able to view your health information using other applications (apps) compatible with our system.

## 2022-04-17 NOTE — ED PROVIDER NOTE - OBJECTIVE STATEMENT
80 yo F hx of Cerebral Aneurysms s/p Coiling/Clipping, Migraine, CAD, HTN, HLD, Prediabetes and Hypothyroidism presented with nausea, vomiting and dizziness discharged 2 days ago for hypertensive urgency and new onset Afib on Eliquist. Daughter report that patient went home and became very weak. She started vomiting since last night. Another episode this morning and having a BM at the same time. Daughter unsure why this is happening again.

## 2022-04-17 NOTE — ED PROVIDER NOTE - PHYSICAL EXAMINATION
VITAL SIGNS: I have reviewed nursing notes and confirm.  CONSTITUTIONAL:  in no acute distress.  SKIN: Skin exam is warm and dry, no acute rash.  HEAD: Normocephalic; atraumatic.  EYES: PERRL, EOM intact; conjunctiva and sclera clear.  ENT: No nasal discharge; airway clear. Throat clear.  NECK: Supple; non tender.    CARD: Regular rate and rhythm.  RESP: No wheezes,  no rales or rhonchi.   ABD:  soft; non-distended; non-tender;   EXT: Normal ROM. No clubbing, cyanosis or edema.  NEURO: Alert, oriented. Grossly unremarkable. No focal deficits.  moves all extremities,   PSYCH: Cooperative, appropriate.

## 2022-04-17 NOTE — ED PROVIDER NOTE - PROGRESS NOTE DETAILS
CT unremarkable, patient and family notified of pancreatic cyst. patient ambulated to the bathroom without assistance. tolerating PO. long conversation with family and patient, no acute pathology. patient comfortable going home with zofran and protonix. they will follow up closely with GI next week .

## 2022-04-17 NOTE — ED PROVIDER NOTE - NSFOLLOWUPINSTRUCTIONS_ED_ALL_ED_FT
** stop taking medformin for 2 days**    1) Please follow-up with your primary care doctor in the next 5-7 days.  Please call tomorrow for an appointment.  If you cannot follow-up with your primary care doctor please return to the ED for any urgent issues.  2) You were given a copy of the tests performed today.  Please bring the results with you and review them with your primary care doctor.  3) If you have any worsening of symptoms or any other concerns please return to the ED immediately.  4) Please continue taking your home medications as directed.

## 2022-04-17 NOTE — ED PROVIDER NOTE - CLINICAL SUMMARY MEDICAL DECISION MAKING FREE TEXT BOX
80 yo F p/w nausea/vomiting and feeling week. trop negative. cxr clear. blood work unremarkable. CT abdomen showed no acute pathology. patient tolerated PO. home with meds and GI follow up.

## 2022-04-17 NOTE — ED ADULT TRIAGE NOTE - CHIEF COMPLAINT QUOTE
pt BIBA c/o lightheaded, dizziness, n/v x 2 days. states recently diagnosed with DM and AFib, taking meds as prescribed. ekg obtained on arrival,

## 2022-04-19 LAB
CULTURE RESULTS: SIGNIFICANT CHANGE UP
SPECIMEN SOURCE: SIGNIFICANT CHANGE UP

## 2022-04-20 ENCOUNTER — APPOINTMENT (OUTPATIENT)
Dept: CARDIOLOGY | Facility: CLINIC | Age: 82
End: 2022-04-20
Payer: MEDICARE

## 2022-04-20 VITALS
DIASTOLIC BLOOD PRESSURE: 58 MMHG | HEART RATE: 64 BPM | BODY MASS INDEX: 27 KG/M2 | SYSTOLIC BLOOD PRESSURE: 116 MMHG | RESPIRATION RATE: 14 BRPM | HEIGHT: 67 IN | WEIGHT: 172 LBS

## 2022-04-20 PROCEDURE — 93000 ELECTROCARDIOGRAM COMPLETE: CPT

## 2022-04-20 PROCEDURE — 99214 OFFICE O/P EST MOD 30 MIN: CPT

## 2022-04-20 RX ORDER — MONTELUKAST 10 MG/1
10 TABLET, FILM COATED ORAL
Refills: 0 | Status: DISCONTINUED | COMMUNITY
End: 2022-04-20

## 2022-04-20 RX ORDER — CLOPIDOGREL 75 MG/1
75 TABLET, FILM COATED ORAL
Refills: 0 | Status: DISCONTINUED | COMMUNITY
End: 2022-04-20

## 2022-04-20 RX ORDER — AMLODIPINE BESYLATE 10 MG/1
10 TABLET ORAL
Qty: 90 | Refills: 2 | Status: ACTIVE | COMMUNITY
Start: 2022-04-20

## 2022-04-26 PROCEDURE — 80048 BASIC METABOLIC PNL TOTAL CA: CPT

## 2022-04-26 PROCEDURE — 85027 COMPLETE CBC AUTOMATED: CPT

## 2022-04-26 PROCEDURE — 87637 SARSCOV2&INF A&B&RSV AMP PRB: CPT

## 2022-04-26 PROCEDURE — 99285 EMERGENCY DEPT VISIT HI MDM: CPT | Mod: 25

## 2022-04-26 PROCEDURE — 84484 ASSAY OF TROPONIN QUANT: CPT

## 2022-04-26 PROCEDURE — G1004: CPT

## 2022-04-26 PROCEDURE — 82962 GLUCOSE BLOOD TEST: CPT

## 2022-04-26 PROCEDURE — 70498 CT ANGIOGRAPHY NECK: CPT | Mod: MF

## 2022-04-26 PROCEDURE — 96374 THER/PROPH/DIAG INJ IV PUSH: CPT

## 2022-04-26 PROCEDURE — 83690 ASSAY OF LIPASE: CPT

## 2022-04-26 PROCEDURE — 93306 TTE W/DOPPLER COMPLETE: CPT

## 2022-04-26 PROCEDURE — 83036 HEMOGLOBIN GLYCOSYLATED A1C: CPT

## 2022-04-26 PROCEDURE — 85025 COMPLETE CBC W/AUTO DIFF WBC: CPT

## 2022-04-26 PROCEDURE — 80053 COMPREHEN METABOLIC PANEL: CPT

## 2022-04-26 PROCEDURE — 85610 PROTHROMBIN TIME: CPT

## 2022-04-26 PROCEDURE — 85730 THROMBOPLASTIN TIME PARTIAL: CPT

## 2022-04-26 PROCEDURE — 71045 X-RAY EXAM CHEST 1 VIEW: CPT

## 2022-04-26 PROCEDURE — 70496 CT ANGIOGRAPHY HEAD: CPT | Mod: MF

## 2022-04-26 PROCEDURE — 81001 URINALYSIS AUTO W/SCOPE: CPT

## 2022-04-26 PROCEDURE — 96375 TX/PRO/DX INJ NEW DRUG ADDON: CPT

## 2022-04-26 PROCEDURE — 83735 ASSAY OF MAGNESIUM: CPT

## 2022-04-26 PROCEDURE — 93005 ELECTROCARDIOGRAM TRACING: CPT

## 2022-04-26 PROCEDURE — 36415 COLL VENOUS BLD VENIPUNCTURE: CPT

## 2022-04-26 PROCEDURE — 70450 CT HEAD/BRAIN W/O DYE: CPT | Mod: ME

## 2022-04-26 NOTE — ASSESSMENT
[FreeTextEntry1] : 1.  EKG today demonstrates sinus rhythm at 64 bpm.  First degree AV block.  RSr' in lead V1.  Qs in leads V1 through V3 which are chronic changes.  Non-specific ST-T changes. \par \par 2.  Episodes of disorientation associated with elevated blood pressure and new onset atrial fibrillation.  Patient’s mentation appears intact at this time.  The patient’s blood pressure appears well controlled and she is in sinus rhythm tolerating Eliquis therapy well.  I have recommended to her and her daughter to proceed with a neurologic evaluation as well as continue with all current medications, follow a strict low-salt diet, and return here for reevaluation in two weeks.  Should symptoms recur, the patient is to be readmitted to formal testing.

## 2022-04-26 NOTE — HISTORY OF PRESENT ILLNESS
[FreeTextEntry1] : From a cardiac standpoint, Mrs. Walker presents today without chest pain, shortness of breath, or other cardiac symptoms.  She does feel somewhat fatigued but overall appears stable.\par \par \par

## 2022-04-26 NOTE — REASON FOR VISIT
[FreeTextEntry1] : Mrs. 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  Patient was recently admitted to [facility] with a hypertensive crisis associated with altered mental status and new onset atrial fibrillation.  The patient's workup did not reveal a significant unifying cause for her presentation.  Her blood pressure was stabilized and she was started on Eliquis.  She was readmitted some six days later with weakness, dizziness, as well as nausea and vomiting.  At no time did the patient complain of chest pain or significant shortness of breath. Patient again evaluated and subsequently released to undergo outpatient follow up.   \par \par

## 2022-04-28 ENCOUNTER — APPOINTMENT (OUTPATIENT)
Dept: CARDIOLOGY | Facility: CLINIC | Age: 82
End: 2022-04-28

## 2022-05-11 ENCOUNTER — APPOINTMENT (OUTPATIENT)
Dept: CARDIOLOGY | Facility: CLINIC | Age: 82
End: 2022-05-11

## 2022-05-11 VITALS
BODY MASS INDEX: 25.9 KG/M2 | DIASTOLIC BLOOD PRESSURE: 60 MMHG | WEIGHT: 165 LBS | RESPIRATION RATE: 14 BRPM | SYSTOLIC BLOOD PRESSURE: 138 MMHG | HEART RATE: 59 BPM | HEIGHT: 67 IN

## 2022-05-11 DIAGNOSIS — F41.9 ANXIETY DISORDER, UNSPECIFIED: ICD-10-CM

## 2022-05-11 DIAGNOSIS — I67.1 CEREBRAL ANEURYSM, NONRUPTURED: ICD-10-CM

## 2022-05-11 DIAGNOSIS — I10 ESSENTIAL (PRIMARY) HYPERTENSION: ICD-10-CM

## 2022-05-11 DIAGNOSIS — F32.A ANXIETY DISORDER, UNSPECIFIED: ICD-10-CM

## 2022-05-11 DIAGNOSIS — E78.00 PURE HYPERCHOLESTEROLEMIA, UNSPECIFIED: ICD-10-CM

## 2022-05-11 DIAGNOSIS — I48.0 PAROXYSMAL ATRIAL FIBRILLATION: ICD-10-CM

## 2022-05-11 DIAGNOSIS — Z95.5 PRESENCE OF CORONARY ANGIOPLASTY IMPLANT AND GRAFT: ICD-10-CM

## 2022-05-11 PROCEDURE — 99214 OFFICE O/P EST MOD 30 MIN: CPT

## 2022-05-11 PROCEDURE — 93000 ELECTROCARDIOGRAM COMPLETE: CPT

## 2022-05-11 RX ORDER — ISOSORBIDE MONONITRATE 30 MG/1
30 TABLET, EXTENDED RELEASE ORAL DAILY
Qty: 90 | Refills: 1 | Status: DISCONTINUED | COMMUNITY
Start: 2022-04-07 | End: 2022-05-11

## 2022-05-11 RX ORDER — HYDRALAZINE HYDROCHLORIDE 25 MG/1
25 TABLET ORAL TWICE DAILY
Qty: 60 | Refills: 3 | Status: DISCONTINUED | COMMUNITY
End: 2022-05-11

## 2022-05-12 PROBLEM — Z95.5 S/P CORONARY ARTERY STENT PLACEMENT: Status: ACTIVE | Noted: 2019-09-12

## 2022-05-12 PROBLEM — E78.00 HYPERCHOLESTEROLEMIA: Status: ACTIVE | Noted: 2018-01-08

## 2022-05-12 PROBLEM — I10 HYPERTENSION: Status: ACTIVE | Noted: 2018-01-08

## 2022-05-12 PROBLEM — I48.0 PAF (PAROXYSMAL ATRIAL FIBRILLATION): Status: ACTIVE | Noted: 2022-04-24

## 2022-05-12 PROBLEM — I67.1 BERRY ANEURYSM: Status: ACTIVE | Noted: 2018-01-08

## 2022-05-12 PROBLEM — F41.9 ANXIETY AND DEPRESSION: Status: ACTIVE | Noted: 2018-06-01

## 2022-05-12 RX ORDER — VENLAFAXINE 37.5 MG/1
37.5 TABLET ORAL DAILY
Refills: 0 | Status: ACTIVE | COMMUNITY

## 2022-05-16 NOTE — REASON FOR VISIT
[FreeTextEntry1] : Mrs. 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

## 2022-05-16 NOTE — ASSESSMENT
[FreeTextEntry1] : 1.  EKG today revealed sinus bradycardia at 59 bpm.  First degree AV block.  Incomplete right bundle branch block.  Qs in leads V1 through V3 which are chronic.  No ischemic changes. \par \par 2.  Hypertension:  Blood pressure well controlled at this time on current medications.  Patient advised on a low-salt diet.  \par \par 3.  Paroxysmal atrial fibrillation:  Patient presents today in sinus rhythm tolerating Xarelto 20 mg daily.  Will continue to monitor closely and refer patient to EP at some point in the near future.  If clinically stable, follow up office visit here four weeks.  \par

## 2022-05-16 NOTE — HISTORY OF PRESENT ILLNESS
[FreeTextEntry1] : Mrs. Walker was recently admitted to [facility] on two occasions with hypertensive crisis and altered mental status.  One of those episodes was also associated with new onset atrial fibrillation.  \par \par Mrs. Walker presents today feeling well without further episodes of hypertension, anxiety, or mental status changes.  She was started on antianxiety medication which she feels is helping a great deal. \par

## 2022-06-23 ENCOUNTER — APPOINTMENT (OUTPATIENT)
Dept: CARDIOLOGY | Facility: CLINIC | Age: 82
End: 2022-06-23

## 2022-08-11 ENCOUNTER — NON-APPOINTMENT (OUTPATIENT)
Age: 82
End: 2022-08-11

## 2022-08-21 ENCOUNTER — EMERGENCY (EMERGENCY)
Facility: HOSPITAL | Age: 82
LOS: 1 days | Discharge: HOME | End: 2022-08-21
Attending: EMERGENCY MEDICINE
Payer: MEDICARE

## 2022-08-21 VITALS
OXYGEN SATURATION: 100 % | RESPIRATION RATE: 19 BRPM | HEART RATE: 60 BPM | TEMPERATURE: 97 F | DIASTOLIC BLOOD PRESSURE: 74 MMHG | SYSTOLIC BLOOD PRESSURE: 146 MMHG | HEIGHT: 67 IN | WEIGHT: 160.06 LBS

## 2022-08-21 DIAGNOSIS — Z90.710 ACQUIRED ABSENCE OF BOTH CERVIX AND UTERUS: Chronic | ICD-10-CM

## 2022-08-21 DIAGNOSIS — H26.9 UNSPECIFIED CATARACT: Chronic | ICD-10-CM

## 2022-08-21 DIAGNOSIS — Z98.89 OTHER SPECIFIED POSTPROCEDURAL STATES: Chronic | ICD-10-CM

## 2022-08-21 LAB
ALBUMIN SERPL ELPH-MCNC: 5.1 G/DL — SIGNIFICANT CHANGE UP (ref 3.3–5.2)
ALP SERPL-CCNC: 103 U/L — SIGNIFICANT CHANGE UP (ref 40–120)
ALT FLD-CCNC: 21 U/L — SIGNIFICANT CHANGE UP
ANION GAP SERPL CALC-SCNC: 13 MMOL/L — SIGNIFICANT CHANGE UP (ref 5–17)
APPEARANCE UR: CLEAR — SIGNIFICANT CHANGE UP
AST SERPL-CCNC: 22 U/L — SIGNIFICANT CHANGE UP
BASOPHILS # BLD AUTO: 0.02 K/UL — SIGNIFICANT CHANGE UP (ref 0–0.2)
BASOPHILS NFR BLD AUTO: 0.2 % — SIGNIFICANT CHANGE UP (ref 0–2)
BILIRUB SERPL-MCNC: 0.5 MG/DL — SIGNIFICANT CHANGE UP (ref 0.4–2)
BILIRUB UR-MCNC: NEGATIVE — SIGNIFICANT CHANGE UP
BUN SERPL-MCNC: 17.2 MG/DL — SIGNIFICANT CHANGE UP (ref 8–20)
CALCIUM SERPL-MCNC: 10 MG/DL — SIGNIFICANT CHANGE UP (ref 8.4–10.5)
CHLORIDE SERPL-SCNC: 100 MMOL/L — SIGNIFICANT CHANGE UP (ref 98–107)
CO2 SERPL-SCNC: 25 MMOL/L — SIGNIFICANT CHANGE UP (ref 22–29)
COLOR SPEC: YELLOW — SIGNIFICANT CHANGE UP
CREAT SERPL-MCNC: 1.17 MG/DL — SIGNIFICANT CHANGE UP (ref 0.5–1.3)
DIFF PNL FLD: NEGATIVE — SIGNIFICANT CHANGE UP
EGFR: 47 ML/MIN/1.73M2 — LOW
EOSINOPHIL # BLD AUTO: 0 K/UL — SIGNIFICANT CHANGE UP (ref 0–0.5)
EOSINOPHIL NFR BLD AUTO: 0 % — SIGNIFICANT CHANGE UP (ref 0–6)
GLUCOSE SERPL-MCNC: 137 MG/DL — HIGH (ref 70–99)
GLUCOSE UR QL: NEGATIVE MG/DL — SIGNIFICANT CHANGE UP
HCT VFR BLD CALC: 38.3 % — SIGNIFICANT CHANGE UP (ref 34.5–45)
HGB BLD-MCNC: 13.3 G/DL — SIGNIFICANT CHANGE UP (ref 11.5–15.5)
IMM GRANULOCYTES NFR BLD AUTO: 0.3 % — SIGNIFICANT CHANGE UP (ref 0–1.5)
KETONES UR-MCNC: NEGATIVE — SIGNIFICANT CHANGE UP
LEUKOCYTE ESTERASE UR-ACNC: NEGATIVE — SIGNIFICANT CHANGE UP
LYMPHOCYTES # BLD AUTO: 1.14 K/UL — SIGNIFICANT CHANGE UP (ref 1–3.3)
LYMPHOCYTES # BLD AUTO: 13.2 % — SIGNIFICANT CHANGE UP (ref 13–44)
MAGNESIUM SERPL-MCNC: 2.1 MG/DL — SIGNIFICANT CHANGE UP (ref 1.6–2.6)
MCHC RBC-ENTMCNC: 29.9 PG — SIGNIFICANT CHANGE UP (ref 27–34)
MCHC RBC-ENTMCNC: 34.7 GM/DL — SIGNIFICANT CHANGE UP (ref 32–36)
MCV RBC AUTO: 86.1 FL — SIGNIFICANT CHANGE UP (ref 80–100)
MONOCYTES # BLD AUTO: 0.35 K/UL — SIGNIFICANT CHANGE UP (ref 0–0.9)
MONOCYTES NFR BLD AUTO: 4.1 % — SIGNIFICANT CHANGE UP (ref 2–14)
NEUTROPHILS # BLD AUTO: 7.09 K/UL — SIGNIFICANT CHANGE UP (ref 1.8–7.4)
NEUTROPHILS NFR BLD AUTO: 82.2 % — HIGH (ref 43–77)
NITRITE UR-MCNC: NEGATIVE — SIGNIFICANT CHANGE UP
PH UR: 6 — SIGNIFICANT CHANGE UP (ref 5–8)
PLATELET # BLD AUTO: 227 K/UL — SIGNIFICANT CHANGE UP (ref 150–400)
POTASSIUM SERPL-MCNC: 4.6 MMOL/L — SIGNIFICANT CHANGE UP (ref 3.5–5.3)
POTASSIUM SERPL-SCNC: 4.6 MMOL/L — SIGNIFICANT CHANGE UP (ref 3.5–5.3)
PROT SERPL-MCNC: 7.9 G/DL — SIGNIFICANT CHANGE UP (ref 6.6–8.7)
PROT UR-MCNC: 15
RAPID RVP RESULT: SIGNIFICANT CHANGE UP
RBC # BLD: 4.45 M/UL — SIGNIFICANT CHANGE UP (ref 3.8–5.2)
RBC # FLD: 12.6 % — SIGNIFICANT CHANGE UP (ref 10.3–14.5)
RBC CASTS # UR COMP ASSIST: NEGATIVE /HPF — SIGNIFICANT CHANGE UP (ref 0–4)
SARS-COV-2 RNA SPEC QL NAA+PROBE: SIGNIFICANT CHANGE UP
SODIUM SERPL-SCNC: 138 MMOL/L — SIGNIFICANT CHANGE UP (ref 135–145)
SP GR SPEC: 1.01 — SIGNIFICANT CHANGE UP (ref 1.01–1.02)
TROPONIN T SERPL-MCNC: <0.01 NG/ML — SIGNIFICANT CHANGE UP (ref 0–0.06)
TSH SERPL-MCNC: 5.49 UIU/ML — HIGH (ref 0.27–4.2)
UROBILINOGEN FLD QL: NEGATIVE MG/DL — SIGNIFICANT CHANGE UP
WBC # BLD: 8.63 K/UL — SIGNIFICANT CHANGE UP (ref 3.8–10.5)
WBC # FLD AUTO: 8.63 K/UL — SIGNIFICANT CHANGE UP (ref 3.8–10.5)
WBC UR QL: NEGATIVE /HPF — SIGNIFICANT CHANGE UP (ref 0–5)

## 2022-08-21 PROCEDURE — 93010 ELECTROCARDIOGRAM REPORT: CPT

## 2022-08-21 PROCEDURE — 71045 X-RAY EXAM CHEST 1 VIEW: CPT | Mod: 26

## 2022-08-21 PROCEDURE — 99220: CPT | Mod: FS,GC

## 2022-08-21 PROCEDURE — 70450 CT HEAD/BRAIN W/O DYE: CPT | Mod: 26,MA

## 2022-08-21 RX ORDER — ATORVASTATIN CALCIUM 80 MG/1
20 TABLET, FILM COATED ORAL AT BEDTIME
Refills: 0 | Status: DISCONTINUED | OUTPATIENT
Start: 2022-08-21 | End: 2022-08-25

## 2022-08-21 RX ORDER — DONEPEZIL HYDROCHLORIDE 10 MG/1
1 TABLET, FILM COATED ORAL
Qty: 0 | Refills: 0 | DISCHARGE

## 2022-08-21 RX ORDER — LEVOTHYROXINE SODIUM 125 MCG
75 TABLET ORAL DAILY
Refills: 0 | Status: DISCONTINUED | OUTPATIENT
Start: 2022-08-21 | End: 2022-08-25

## 2022-08-21 RX ORDER — PREGABALIN 225 MG/1
1 CAPSULE ORAL
Qty: 0 | Refills: 0 | DISCHARGE

## 2022-08-21 RX ORDER — VENLAFAXINE HCL 75 MG
1 CAPSULE, EXT RELEASE 24 HR ORAL
Qty: 0 | Refills: 0 | DISCHARGE

## 2022-08-21 RX ORDER — ONDANSETRON 8 MG/1
4 TABLET, FILM COATED ORAL EVERY 6 HOURS
Refills: 0 | Status: DISCONTINUED | OUTPATIENT
Start: 2022-08-21 | End: 2022-08-25

## 2022-08-21 RX ORDER — MECLIZINE HCL 12.5 MG
25 TABLET ORAL ONCE
Refills: 0 | Status: COMPLETED | OUTPATIENT
Start: 2022-08-21 | End: 2022-08-21

## 2022-08-21 RX ORDER — OMEGA-3 ACID ETHYL ESTERS 1 G
1 CAPSULE ORAL
Qty: 0 | Refills: 0 | DISCHARGE

## 2022-08-21 RX ORDER — VENLAFAXINE HCL 75 MG
37.5 CAPSULE, EXT RELEASE 24 HR ORAL
Refills: 0 | Status: DISCONTINUED | OUTPATIENT
Start: 2022-08-21 | End: 2022-08-25

## 2022-08-21 RX ORDER — NEBIVOLOL HYDROCHLORIDE 5 MG/1
1 TABLET ORAL
Qty: 0 | Refills: 0 | DISCHARGE

## 2022-08-21 RX ORDER — LORATADINE 10 MG/1
10 TABLET ORAL DAILY
Refills: 0 | Status: DISCONTINUED | OUTPATIENT
Start: 2022-08-21 | End: 2022-08-25

## 2022-08-21 RX ORDER — MECLIZINE HCL 12.5 MG
25 TABLET ORAL EVERY 6 HOURS
Refills: 0 | Status: DISCONTINUED | OUTPATIENT
Start: 2022-08-21 | End: 2022-08-25

## 2022-08-21 RX ORDER — LEVOCETIRIZINE DIHYDROCHLORIDE 0.5 MG/ML
1 SOLUTION ORAL
Qty: 0 | Refills: 0 | DISCHARGE

## 2022-08-21 RX ORDER — MONTELUKAST 4 MG/1
10 TABLET, CHEWABLE ORAL AT BEDTIME
Refills: 0 | Status: DISCONTINUED | OUTPATIENT
Start: 2022-08-21 | End: 2022-08-25

## 2022-08-21 RX ORDER — PREGABALIN 225 MG/1
1000 CAPSULE ORAL DAILY
Refills: 0 | Status: DISCONTINUED | OUTPATIENT
Start: 2022-08-21 | End: 2022-08-25

## 2022-08-21 RX ORDER — LOSARTAN POTASSIUM 100 MG/1
25 TABLET, FILM COATED ORAL DAILY
Refills: 0 | Status: DISCONTINUED | OUTPATIENT
Start: 2022-08-21 | End: 2022-08-25

## 2022-08-21 RX ORDER — APIXABAN 2.5 MG/1
5 TABLET, FILM COATED ORAL
Refills: 0 | Status: DISCONTINUED | OUTPATIENT
Start: 2022-08-21 | End: 2022-08-25

## 2022-08-21 RX ORDER — LEVOTHYROXINE SODIUM 125 MCG
1 TABLET ORAL
Qty: 0 | Refills: 0 | DISCHARGE

## 2022-08-21 RX ORDER — ONDANSETRON 8 MG/1
4 TABLET, FILM COATED ORAL ONCE
Refills: 0 | Status: COMPLETED | OUTPATIENT
Start: 2022-08-21 | End: 2022-08-21

## 2022-08-21 RX ORDER — LOSARTAN POTASSIUM 100 MG/1
1 TABLET, FILM COATED ORAL
Qty: 0 | Refills: 0 | DISCHARGE

## 2022-08-21 RX ORDER — ATORVASTATIN CALCIUM 80 MG/1
1 TABLET, FILM COATED ORAL
Qty: 0 | Refills: 0 | DISCHARGE

## 2022-08-21 RX ORDER — LOSARTAN POTASSIUM 100 MG/1
2 TABLET, FILM COATED ORAL
Qty: 0 | Refills: 0 | DISCHARGE

## 2022-08-21 RX ORDER — SODIUM CHLORIDE 9 MG/ML
1000 INJECTION INTRAMUSCULAR; INTRAVENOUS; SUBCUTANEOUS ONCE
Refills: 0 | Status: COMPLETED | OUTPATIENT
Start: 2022-08-21 | End: 2022-08-21

## 2022-08-21 RX ORDER — DIAZEPAM 5 MG
5 TABLET ORAL EVERY 8 HOURS
Refills: 0 | Status: DISCONTINUED | OUTPATIENT
Start: 2022-08-21 | End: 2022-08-21

## 2022-08-21 RX ORDER — HYDROCORTISONE 1 %
1 OINTMENT (GRAM) TOPICAL
Refills: 0 | Status: DISCONTINUED | OUTPATIENT
Start: 2022-08-21 | End: 2022-08-25

## 2022-08-21 RX ORDER — DONEPEZIL HYDROCHLORIDE 10 MG/1
10 TABLET, FILM COATED ORAL AT BEDTIME
Refills: 0 | Status: DISCONTINUED | OUTPATIENT
Start: 2022-08-21 | End: 2022-08-25

## 2022-08-21 RX ORDER — AMLODIPINE BESYLATE 2.5 MG/1
10 TABLET ORAL DAILY
Refills: 0 | Status: DISCONTINUED | OUTPATIENT
Start: 2022-08-21 | End: 2022-08-25

## 2022-08-21 RX ADMIN — MONTELUKAST 10 MILLIGRAM(S): 4 TABLET, CHEWABLE ORAL at 23:55

## 2022-08-21 RX ADMIN — LOSARTAN POTASSIUM 25 MILLIGRAM(S): 100 TABLET, FILM COATED ORAL at 23:54

## 2022-08-21 RX ADMIN — ATORVASTATIN CALCIUM 20 MILLIGRAM(S): 80 TABLET, FILM COATED ORAL at 23:55

## 2022-08-21 RX ADMIN — APIXABAN 5 MILLIGRAM(S): 2.5 TABLET, FILM COATED ORAL at 23:54

## 2022-08-21 RX ADMIN — Medication 25 MILLIGRAM(S): at 13:18

## 2022-08-21 RX ADMIN — Medication 37.5 MILLIGRAM(S): at 23:55

## 2022-08-21 RX ADMIN — LORATADINE 10 MILLIGRAM(S): 10 TABLET ORAL at 23:55

## 2022-08-21 RX ADMIN — PREGABALIN 1000 MICROGRAM(S): 225 CAPSULE ORAL at 23:55

## 2022-08-21 RX ADMIN — Medication 1 TABLET(S): at 23:53

## 2022-08-21 RX ADMIN — Medication 25 MILLIGRAM(S): at 16:29

## 2022-08-21 RX ADMIN — SODIUM CHLORIDE 1000 MILLILITER(S): 9 INJECTION INTRAMUSCULAR; INTRAVENOUS; SUBCUTANEOUS at 14:57

## 2022-08-21 RX ADMIN — ONDANSETRON 4 MILLIGRAM(S): 8 TABLET, FILM COATED ORAL at 13:15

## 2022-08-21 RX ADMIN — AMLODIPINE BESYLATE 10 MILLIGRAM(S): 2.5 TABLET ORAL at 23:54

## 2022-08-21 RX ADMIN — DONEPEZIL HYDROCHLORIDE 10 MILLIGRAM(S): 10 TABLET, FILM COATED ORAL at 23:54

## 2022-08-21 NOTE — ED CDU PROVIDER INITIAL DAY NOTE - PHYSICAL EXAMINATION
Gen: No acute distress, non toxic  HENT: NCAT, Mucous membranes moist, Oropharynx without exudates, uvula midline  Eyes: pink conjunctivae, EOMI, PERRL  CV: RRR, nl s1/s2.  Resp: CTAB, normal rate and effort  GI: Abdomen soft, NT, ND. No rebound, no guarding  Neuro: A&O x 3, CN II-XII intact, sensorimotor intact without deficits, Finger to nose intact without dysmetria, no pronator drift. +Recurrent dizziness upon sitting up.  MSK: No spine or joint tenderness to palpation, Full ROM ext x 4  Skin: No rashes. intact and perfused.

## 2022-08-21 NOTE — ED CDU PROVIDER INITIAL DAY NOTE - CHIEF COMPLAINT
[address]   Emergency Department Encounter  Emergency Medicine Resident     Pt Name: Jonathon Underwood  MRN: [id number]  [place] 1983  Date of evaluation: 5/19/19  PCP:  Niall Coppola MD    [address] [place] [address]       Chief Complaint   Patient presents with    Chest Pain       HISTORY OF PRESENT ILLNESS  (Location/Symptom, Timing/Onset, Context/Setting, Quality, Duration, Modifying Factors, Severity.)    Jonathon Underwood is a 28 y.o. male who presents with 5 days of waxing and waning midsternal pressuring chest pain that is associated with shortness of breath, dizziness, diaphoresis. Patient states that the pain is worse when he lays flat on his back or lays flat on his belly. He states that it initially started getting better but then that in the past day the pain has become unbearable and he decided to come the evaluated. He has not taken anything at home for the pain. Patient does a significant past cardiac history of pericarditis and has followed up with a cardiologist in the past but states that he has not seen him in a while. He denies any recent illness. He denies any nausea, vomiting, back pain, neck pain, numbness or tingling, syncope. PAST MEDICAL / SURGICAL / SOCIAL / FAMILY HISTORY    has a past medical history of Anemia, CAD (coronary artery disease), Hemorrhoids, Hyperlipidemia, Hypertension, Obesity, Pericarditis, Pneumonia, Pneumonia, Post-void dribbling, Schizo affective schizophrenia ([facility] [address] Sleep apnea, Snores, SOB (shortness of breath), Tobacco abuse, and Wheezing. has a past surgical history that includes pr egd transoral biopsy single/multiple (N/A, 9/15/2017); pr colon ca scrn not hi rsk ind (N/A, 9/18/2017); Endoscopy, colon, diagnostic; Colonoscopy; and Cystoscopy (N/A, 12/15/2017).     Social History     Socioeconomic History    Marital status: Single     Spouse name: Not on file    Number of children: Not on file    Years of education: Not on file    Highest education level: Not on file   Occupational History    Occupation: unemployed   Social Needs    Financial resource strain: Not on file    Food insecurity:     Worry: Not on file     Inability: Not on file   NeuroPace needs:     Medical: Not on file     Non-medical: Not on file   Tobacco Use    Smoking status: Current Every Day Smoker     Packs/day: 0.25     Years: 12.00     Pack years: 3.00     Types: Cigars, Cigarettes    Smokeless tobacco: Never Used   Substance and Sexual Activity    Alcohol use: No     Alcohol/week: 0.0 oz    Drug use: Yes     Types: Marijuana     Comment: last used 2014    Sexual activity: Not Currently     Partners: Female   Lifestyle    Physical activity:     Days per week: Not on file     Minutes per session: Not on file    Stress: Not on file   Relationships    Social connections:     Talks on phone: Not on file     Gets together: Not on file     Attends Advent service: Not on file     Active member of club or organization: Not on file     Attends meetings of clubs or organizations: Not on file     Relationship status: Not on file    Intimate partner violence:     Fear of current or ex partner: Not on file     Emotionally abused: Not on file     Physically abused: Not on file     Forced sexual activity: Not on file   Other Topics Concern    Not on file   Social History Narrative    Not on file       Family History   Problem Relation Age of Onset    Stroke Maternal Grandmother     Heart Attack Father        Allergies:    Patient has no known allergies. Home Medications:  Prior to Admission medications    Medication Sig Start Date End Date Taking?  Authorizing Provider   ibuprofen (ADVIL;MOTRIN) 800 MG tablet Take 1 tablet by mouth every 8 hours as needed for Pain 5/19/19  Yes Edita Chauhan,    acetaminophen (TYLENOL) 325 MG tablet Take 2 tablets by mouth every 6 hours as needed for Pain 5/19/19  Yes Edita Chauhan, DO   oxybutynin (DITROPAN XL) 10 MG extended release tablet Take 1 tablet by mouth daily 5/17/19   Stephen Crain MD   tamsulosin [facility]) 0.4 MG capsule Take 1 capsule by mouth daily 5/17/19   Stephen Crain MD   omeprazole (PRILOSEC) 20 MG delayed release capsule Take 1 capsule by mouth 2 times daily for 14 days 10/5/18 10/19/18  Juliana Alfredo MD   ferrous sulfate 325 (65 Fe) MG EC tablet Take 1 tablet by mouth 3 times daily (with meals) 9/20/18   Mitchel Antonio MD   metoprolol succinate (TOPROL XL) 50 MG extended release tablet Take 1 tablet by mouth daily 9/20/18   Mitchel Antonio MD   tamsulosin [facility]) 0.4 MG capsule Take 1 capsule by mouth daily 7/20/18   Gaylyn Blizzard, MD   benztropine (COGENTIN) 1 MG tablet Take 1 mg by mouth 2 times daily    Historical Provider, MD   FLUoxetine (PROZAC) 20 MG capsule Take 20 mg by mouth daily    Historical Provider, MD   omeprazole (PRILOSEC OTC) 20 MG tablet Take 1 tablet by mouth 2 times daily for 14 days 1/15/18 7/20/18  Guero Page MD   TRAZODONE HCL PO Take 1 tablet by mouth nightly as needed Unknown dose    Historical Provider, MD   risperiDONE (RISPERDAL) 0.5 MG tablet Take 1 tablet by mouth nightly  Patient taking differently: Take 2 mg by mouth nightly  9/18/17   Nora Nunez MD       REVIEW OF SYSTEMS    (2-9 systems for level 4, 10 or more for level 5)    Review of Systems   Constitutional: Positive for diaphoresis. Eyes: Negative for visual disturbance. Respiratory: Positive for chest tightness and shortness of breath. Negative for cough. Cardiovascular: Positive for chest pain. Gastrointestinal: Negative for nausea and vomiting. Musculoskeletal: Negative for back pain and myalgias. Skin: Negative for pallor. Allergic/Immunologic:        NKDA   Neurological: Positive for dizziness. Hematological: Does not bruise/bleed easily.        PHYSICAL EXAM   (up to 7 for level 4, 8 or more for level 5)    VITALS:   Vitals:    05/19/19 0415 05/19/19 0446   BP: 115/85 (!) 137/91   Pulse: 77 76   Resp: 20 16   SpO2: 96% 96%       Physical Exam   Constitutional: He is oriented to person, place, and time. He appears well-developed and well-nourished. He appears distressed. HENT:   Head: Normocephalic and atraumatic. Eyes: Pupils are equal, round, and reactive to light. Conjunctivae are normal.   Neck: Normal range of motion. Neck supple. Cardiovascular: Normal rate, regular rhythm, normal heart sounds and intact distal pulses. Exam reveals no gallop and no friction rub. No murmur heard. Pulmonary/Chest: Effort normal and breath sounds normal. No respiratory distress. He exhibits no tenderness. Abdominal: Soft. Bowel sounds are normal. He exhibits no distension. There is no tenderness. Obese   Musculoskeletal: Normal range of motion. He exhibits no edema. Neurological: He is alert and oriented to person, place, and time. Skin: Skin is warm and dry. No rash noted. He is not diaphoretic. No pallor. Psychiatric: He has a normal mood and affect. His behavior is normal.   Nursing note and vitals reviewed.       DIFFERENTIAL  DIAGNOSIS   PLAN (LABS / IMAGING / EKG):  Orders Placed This Encounter   Procedures    XR CHEST STANDARD (2 VW)    CBC    BASIC METABOLIC PANEL    Troponin    C-REACTIVE PROTEIN    Sedimentation Rate    Telemetry monitoring    Vital signs    Insert peripheral IV       MEDICATIONS ORDERED:  Orders Placed This Encounter   Medications    ketorolac (TORADOL) injection 30 mg    ibuprofen (ADVIL;MOTRIN) 800 MG tablet     Sig: Take 1 tablet by mouth every 8 hours as needed for Pain     Dispense:  20 tablet     Refill:  0    acetaminophen (TYLENOL) 325 MG tablet     Sig: Take 2 tablets by mouth every 6 hours as needed for Pain     Dispense:  20 tablet     Refill:  0       DDX:   Pericarditis, myocarditis, pericardial effusion, ACS, costochondritis, precordial catch syndrome    DIAGNOSTIC RESULTS / EMERGENCYDEPARTMENT COURSE / MDM   LABS:  Labs Reviewed   C-REACTIVE The patient is a 82y Female complaining of nausea.

## 2022-08-21 NOTE — ED CDU PROVIDER INITIAL DAY NOTE - DETAILS
MRI brain MRI brain, symptomatic control;  I, Denilson Garvin, participated in the care of this patient with the ACP. I discussed the history and physical exam findings as well as lab results and plan of care with the ACP. I agree with ACP's history, physical and assessment.

## 2022-08-21 NOTE — ED PROVIDER NOTE - CONSTITUTIONAL, MLM
Anxious appearing, awake, alert, oriented to person, place, time/situation, unable to recall her medications normal...

## 2022-08-21 NOTE — ED ADULT TRIAGE NOTE - CHIEF COMPLAINT QUOTE
Pt BIBA c/o lightheadedness and nausea since yesterday when she awoke.  PMH vertigo and states feels the same as previous episodes.  No meds at home for vertigo.

## 2022-08-21 NOTE — ED CDU PROVIDER INITIAL DAY NOTE - MEDICAL DECISION MAKING DETAILS
81yo F presenting with intermittent dizziness x 2 days, suspect peripheral vertigo    #Dizziness  - CTH stable  - pending MRI/MRA  - prn meclizine, valium  - q4h neuro checks    #DVT ppx  - pt on eliquis for afib

## 2022-08-21 NOTE — ED PROVIDER NOTE - OBJECTIVE STATEMENT
83 y/o F pt w/ pmhx of htn, cva, brain surgery presents w/ intermittent dizziness, lightheadedness for 2 days. Pt also notes some nausea and decreased appetite. Episodes of dizziness last anywhere from a few minutes to 30 min, nothing brings them on and they go away on their own. Shes had intermittent diarrhea for the last week. She denies headache, chest pain, SOB, abd pain, vomiting, blood in stool, or any other complaints.
(0) understands/communicates without difficulty

## 2022-08-21 NOTE — ED CDU PROVIDER INITIAL DAY NOTE - ATTENDING APP SHARED VISIT CONTRIBUTION OF CARE
dizziness, vertigo, no focal findings on neuro exam, pending MRI.  I, Denilson Garvin, participated in the care of this patient with the ACP. I discussed the history and physical exam findings as well as lab results and plan of care with the ACP. I agree with ACP's history, physical and assessment.

## 2022-08-21 NOTE — ED ADULT NURSE NOTE - CAS ELECT INFOMATION PROVIDED
dc instructions reviewed with patient. patient in understand of dc instructions. no further questions for RN./DC instructions

## 2022-08-21 NOTE — ED PROVIDER NOTE - PATIENT PORTAL LINK FT
You can access the FollowMyHealth Patient Portal offered by [facility] by registering at the following website: [email or web address]. By joining [facility]’s FollowMyHealth portal, you will also be able to view your health information using other applications (apps) compatible with our system.

## 2022-08-21 NOTE — ED CDU PROVIDER INITIAL DAY NOTE - NS ED ROS FT
Gen: denies fever, chills, fatigue, weight loss  Skin: denies rashes, laceration, bruising  HEENT: denies visual changes, ear pain, nasal congestion, throat pain  Respiratory: denies CISNEROS, SOB, cough, wheezing  Cardiovascular: denies chest pain, palpitations, diaphoresis, LE edema  GI: +Nausea, +Diarrhea. denies abdominal pain, vomiting  : denies dysuria, frequency, urgency, bowel/bladder incontinence  MSK: denies joint swelling/pain, back pain, neck pain  Neuro: +Dizziness, +Lightheadedness. denies headache, weakness, numbness  Psych: denies anxiety, depression, SI/HI, visual/auditory hallucinations

## 2022-08-21 NOTE — ED PROVIDER NOTE - CLINICAL SUMMARY MEDICAL DECISION MAKING FREE TEXT BOX
83 y/o F pt w/ PMHx of CVA and brain surgery presents w/ dizziness and lightheadedness. Will get CT head, labs, EKG, CXR, zofran, reassess.

## 2022-08-21 NOTE — ED CDU PROVIDER INITIAL DAY NOTE - NS ED ATTENDING STATEMENT MOD
This was a shared visit with the OLIVIER. I reviewed and verified the documentation and independently performed the documented:

## 2022-08-21 NOTE — ED CDU PROVIDER INITIAL DAY NOTE - OBJECTIVE STATEMENT
83yo F PMHx of Cerebral Aneurysms s/p Coiling/Clipping, Migraine, CAD, HTN, HLD, Afib on eliquis, Prediabetes and Hypothyroidism presented to ED c/o intermittent dizziness, lightheadedness for 2 days. Pt also notes some nausea and decreased appetite. Episodes of dizziness last anywhere from a few minutes to 30 min, nothing brings them on and they go away on their own. Shes had intermittent diarrhea for the last week. Denies headache, chest pain, SOB, abd pain, vomiting, blood in stool, or any other complaints.     Had stable CT head in ED. Reported improvement after meclizine however unable to stand or ambulate without recurrent dizziness. Pt admitted to obs for further evaluation with MRI, MRA head/neck, prn meclizine.
Simple: Patient demonstrates quick and easy understanding/Verbalized Understanding

## 2022-08-21 NOTE — ED PROVIDER NOTE - EKG ADDITIONAL QUESTION - PERFORMED INDEPENDENT VISUALIZATION
Chief Complaint   Patient presents with     Urgent Care     Back Pain     back pain since last week. Pt had been working with heavy lifting.     SUBJECTIVE:  Don Samson is a 70 year old male who presents to the clinic today requesting a work letter. He was seen yesterday for a mid back injury from heavy lifting. He is having muscle spasms. Flexeril, naproxen, icy hot, patches are helping. He declines any numbness, tingling, weakness, bowel bladder change. He declines xray here today. He still feels a little loopy on the flexeril which is why he needs another day off of work.    Past Medical History:   Diagnosis Date     Ankle pain 5/21/2016     Ankle sprain and strain 9/16/2010     CARDIOVASCULAR SCREENING; LDL GOAL LESS THAN 130 9/6/2016     Depression     declines treatment 4/1/16     HTN (hypertension)     declines treatment 4/1/16     Left knee pain 6/18/2015     Plantar fasciitis 9/16/2010     Right knee pain 12/12/2016     Tibialis posterior tendonitis 5/21/2016     amLODIPine (NORVASC) 5 MG tablet, Take 1 tablet (5 mg) by mouth daily  buPROPion (WELLBUTRIN SR) 150 MG 12 hr tablet, TAKE 1 TABLET(150 MG) BY MOUTH TWICE DAILY  buPROPion (WELLBUTRIN XL) 150 MG 24 hr tablet, TAKE 1 TABLET BY MOUTH EVERY DAY. START TAKING ON MONDAY 5/10  cyclobenzaprine (FLEXERIL) 10 MG tablet, Take 1 tablet (10 mg) by mouth 3 times daily as needed for muscle spasms  cyclobenzaprine (FLEXERIL) 10 MG tablet, Take 1 tablet (10 mg) by mouth 3 times daily as needed for muscle spasms  naproxen (NAPROSYN) 500 MG tablet, Take 1 tablet (500 mg) by mouth 2 times daily (with meals)  olmesartan (BENICAR) 20 MG tablet, TAKE 1 TABLET BY MOUTH DAILY WITH BREAKFAST  celecoxib (CELEBREX) 100 MG capsule, TAKE 1 CAPSULE(100 MG) BY MOUTH TWICE DAILY  cyclobenzaprine (FLEXERIL) 5 MG tablet, Take 1 tablet (5 mg) by mouth nightly as needed for muscle spasms  dicyclomine (BENTYL) 10 MG capsule,   Lidocaine (LIDOCARE) 4 % Patch, Place 1 patch onto  "the skin every 24 hours To prevent lidocaine toxicity, patient should be patch free for 12 hrs daily.  lidocaine (XYLOCAINE) 5 % external ointment, Apply topically 3 times daily  naproxen (NAPROSYN) 500 MG tablet, Take 1 tablet (500 mg) by mouth 2 times daily (with meals) for 5 days  ondansetron (ZOFRAN) 4 MG tablet, Take 1 tablet (4 mg) by mouth every 8 hours as needed for nausea  venlafaxine (EFFEXOR-XR) 75 MG 24 hr capsule, Take 3 capsules (225 mg) by mouth daily    No current facility-administered medications on file prior to visit.    Social History     Tobacco Use     Smoking status: Former Smoker     Quit date: 1970     Years since quittin.1     Smokeless tobacco: Never Used   Substance Use Topics     Alcohol use: Yes     Alcohol/week: 0.0 standard drinks     Comment: rarely      No Known Allergies    Review of Systems   All systems negative except for those listed above in HPI.    EXAM:   /79   Pulse 79   Temp 97.7  F (36.5  C) (Temporal)   Resp 20   Ht 1.803 m (5' 11\")   Wt 106.6 kg (235 lb)   SpO2 97%   BMI 32.78 kg/m      Physical Exam  Vitals reviewed.   Constitutional:       Appearance: Normal appearance.   HENT:      Head: Normocephalic and atraumatic.      Nose: Nose normal.      Mouth/Throat:      Mouth: Mucous membranes are moist.      Pharynx: Oropharynx is clear.   Eyes:      Extraocular Movements: Extraocular movements intact.      Conjunctiva/sclera: Conjunctivae normal.      Pupils: Pupils are equal, round, and reactive to light.   Cardiovascular:      Rate and Rhythm: Normal rate.   Pulmonary:      Effort: Pulmonary effort is normal.   Musculoskeletal:         General: Tenderness present. No swelling or deformity. Normal range of motion.      Cervical back: Normal range of motion and neck supple.      Right lower leg: No edema.      Left lower leg: No edema.   Skin:     General: Skin is warm and dry.      Findings: No bruising, erythema or rash.   Neurological:      " General: No focal deficit present.      Mental Status: He is alert and oriented to person, place, and time.      Sensory: No sensory deficit.      Motor: No weakness.      Coordination: Coordination normal.      Gait: Gait abnormal (antalgic).   Psychiatric:         Mood and Affect: Mood normal.         Behavior: Behavior normal.       ASSESSMENT:    ICD-10-CM    1. Back muscle spasm  M62.830    2. Acute midline thoracic back pain  M54.6      PLAN:    Work letter written  Discussed back injury care  He declines PT or x-ray  Continue remedies as previously prescribed    Follow up with primary care provider with any problems, questions or concerns or if symptoms worsen or fail to improve. Patient agreed to plan and verbalized understanding.    Josette Ramos, MICHELLE-BC  [facility]   Yes

## 2022-08-21 NOTE — ED PROVIDER NOTE - NSFOLLOWUPINSTRUCTIONS_ED_ALL_ED_FT
-Please follow-up with your primary care doctor in the next 2 days.  Please call tomorrow for an appointment.  If you cannot follow-up with your primary care doctor please return to the ED for any urgent issues.  - You were given a copy of the tests performed today.  Please bring the results with you and review them with your primary care doctor.  - If you have any worsening of symptoms or any other concerns please return to the ED immediately.  - Please continue taking your home medications as directed.     Vertigo      Vertigo is the feeling that you or your surroundings are moving when they are not. This feeling can come and go at any time. Vertigo often goes away on its own. Vertigo can be dangerous if it occurs while you are doing something that could endanger yourself or others, such as driving or operating machinery.    Your health care provider will do tests to try to determine the cause of your vertigo. Tests will also help your health care provider decide how best to treat your condition.      Follow these instructions at home:      Eating and drinking       A comparison of three sample cups showing dark yellow, yellow, and pale yellow urine.       A sign showing that a person should not drink beer, wine, or liquor.     •Dehydration can make vertigo worse. Drink enough fluid to keep your urine pale yellow.      • Do not drink alcohol.      Activity     •Return to your normal activities as told by your health care provider. Ask your health care provider what activities are safe for you.      •In the morning, first sit up on the side of the bed. When you feel okay, stand slowly while you hold onto something until you know that your balance is fine.      •Move slowly. Avoid sudden body or head movements or certain positions, as told by your health care provider.      •If you have trouble walking or keeping your balance, try using a cane for stability. If you feel dizzy or unstable, sit down right away.      •Avoid doing any tasks that would cause danger to you or others if vertigo occurs.      •Avoid bending down if you feel dizzy. Place items in your home so that they are easy for you to reach without bending or leaning over.      • Do not drive or use machinery if you feel dizzy.      General instructions     •Take over-the-counter and prescription medicines only as told by your health care provider.      •Keep all follow-up visits. This is important.        Contact a health care provider if:    •Your medicines do not relieve your vertigo or they make it worse.      •Your condition gets worse or you develop new symptoms.      •You have a fever.      •You develop nausea or vomiting, or if nausea gets worse.      •Your family or friends notice any behavioral changes.      •You have numbness or a prickling and tingling sensation in part of your body.        Get help right away if you:    •Are always dizzy or you faint.      •Develop severe headaches.      •Develop a stiff neck.      •Develop sensitivity to light.      •Have difficulty moving or speaking.      •Have weakness in your hands, arms, or legs.      •Have changes in your hearing or vision.      These symptoms may represent a serious problem that is an emergency. Do not wait to see if the symptoms will go away. Get medical help right away. Call your local emergency services (911 in the [place] Do not drive yourself to the hospital.       Summary    •Vertigo is the feeling that you or your surroundings are moving when they are not.      •Your health care provider will do tests to try to determine the cause of your vertigo.      •Follow instructions for home care. You may be told to avoid certain tasks, positions, or movements.      •Contact a health care provider if your medicines do not relieve your symptoms, or if you have a fever, nausea, vomiting, or changes in behavior.      •Get help right away if you have severe headaches or difficulty speaking, or you develop hearing or vision problems.      This information is not intended to replace advice given to you by your health care provider. Make sure you discuss any questions you have with your health care provider.

## 2022-08-22 LAB
CULTURE RESULTS: SIGNIFICANT CHANGE UP
SPECIMEN SOURCE: SIGNIFICANT CHANGE UP

## 2022-08-22 PROCEDURE — 70496 CT ANGIOGRAPHY HEAD: CPT | Mod: 26,MA

## 2022-08-22 PROCEDURE — 99226: CPT | Mod: FS

## 2022-08-22 PROCEDURE — 70498 CT ANGIOGRAPHY NECK: CPT | Mod: 26,MA

## 2022-08-22 RX ORDER — SODIUM CHLORIDE 9 MG/ML
1000 INJECTION INTRAMUSCULAR; INTRAVENOUS; SUBCUTANEOUS
Refills: 0 | Status: DISCONTINUED | OUTPATIENT
Start: 2022-08-22 | End: 2022-08-23

## 2022-08-22 RX ADMIN — Medication 25 MILLIGRAM(S): at 21:40

## 2022-08-22 RX ADMIN — Medication 25 MILLIGRAM(S): at 15:14

## 2022-08-22 RX ADMIN — DONEPEZIL HYDROCHLORIDE 10 MILLIGRAM(S): 10 TABLET, FILM COATED ORAL at 21:38

## 2022-08-22 RX ADMIN — AMLODIPINE BESYLATE 10 MILLIGRAM(S): 2.5 TABLET ORAL at 06:14

## 2022-08-22 RX ADMIN — Medication 1 TABLET(S): at 13:01

## 2022-08-22 RX ADMIN — SODIUM CHLORIDE 125 MILLILITER(S): 9 INJECTION INTRAMUSCULAR; INTRAVENOUS; SUBCUTANEOUS at 15:15

## 2022-08-22 RX ADMIN — LOSARTAN POTASSIUM 25 MILLIGRAM(S): 100 TABLET, FILM COATED ORAL at 06:13

## 2022-08-22 RX ADMIN — LORATADINE 10 MILLIGRAM(S): 10 TABLET ORAL at 13:00

## 2022-08-22 RX ADMIN — Medication 37.5 MILLIGRAM(S): at 21:38

## 2022-08-22 RX ADMIN — ATORVASTATIN CALCIUM 20 MILLIGRAM(S): 80 TABLET, FILM COATED ORAL at 21:38

## 2022-08-22 RX ADMIN — APIXABAN 5 MILLIGRAM(S): 2.5 TABLET, FILM COATED ORAL at 06:13

## 2022-08-22 RX ADMIN — Medication 37.5 MILLIGRAM(S): at 13:01

## 2022-08-22 RX ADMIN — MONTELUKAST 10 MILLIGRAM(S): 4 TABLET, CHEWABLE ORAL at 21:38

## 2022-08-22 RX ADMIN — PREGABALIN 1000 MICROGRAM(S): 225 CAPSULE ORAL at 13:00

## 2022-08-22 RX ADMIN — Medication 75 MICROGRAM(S): at 06:10

## 2022-08-22 RX ADMIN — APIXABAN 5 MILLIGRAM(S): 2.5 TABLET, FILM COATED ORAL at 17:20

## 2022-08-22 NOTE — ED CDU PROVIDER SUBSEQUENT DAY NOTE - PROGRESS NOTE DETAILS
called by MRI tech, pt has aneurysm coil, no information regarding MRI compatibility. Radiology not able to perform MRI. Pt feeling better this AM, dizziness minimal. Will instead check CT angio head and neck CTA- Stable chronic postoperative changes. No acute intracranial pathology. 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 Pt re-assessed, still reporting significant dizziness, unable to stand/ambulate without symptoms. States daughter had documentation about aneurysm coil stating it's MRI compatible. will continue symptom control, MRI ordered

## 2022-08-23 PROCEDURE — 99226: CPT | Mod: FS

## 2022-08-23 RX ADMIN — ATORVASTATIN CALCIUM 20 MILLIGRAM(S): 80 TABLET, FILM COATED ORAL at 22:42

## 2022-08-23 RX ADMIN — AMLODIPINE BESYLATE 10 MILLIGRAM(S): 2.5 TABLET ORAL at 06:45

## 2022-08-23 RX ADMIN — Medication 1 TABLET(S): at 11:51

## 2022-08-23 RX ADMIN — Medication 37.5 MILLIGRAM(S): at 17:42

## 2022-08-23 RX ADMIN — LOSARTAN POTASSIUM 25 MILLIGRAM(S): 100 TABLET, FILM COATED ORAL at 06:45

## 2022-08-23 RX ADMIN — Medication 75 MICROGRAM(S): at 06:45

## 2022-08-23 RX ADMIN — Medication 25 MILLIGRAM(S): at 23:45

## 2022-08-23 RX ADMIN — DONEPEZIL HYDROCHLORIDE 10 MILLIGRAM(S): 10 TABLET, FILM COATED ORAL at 22:43

## 2022-08-23 RX ADMIN — Medication 37.5 MILLIGRAM(S): at 08:37

## 2022-08-23 RX ADMIN — LORATADINE 10 MILLIGRAM(S): 10 TABLET ORAL at 11:51

## 2022-08-23 RX ADMIN — Medication 1 SUPPOSITORY(S): at 17:42

## 2022-08-23 RX ADMIN — APIXABAN 5 MILLIGRAM(S): 2.5 TABLET, FILM COATED ORAL at 06:45

## 2022-08-23 RX ADMIN — APIXABAN 5 MILLIGRAM(S): 2.5 TABLET, FILM COATED ORAL at 17:42

## 2022-08-23 RX ADMIN — MONTELUKAST 10 MILLIGRAM(S): 4 TABLET, CHEWABLE ORAL at 22:43

## 2022-08-23 RX ADMIN — Medication 25 MILLIGRAM(S): at 17:42

## 2022-08-23 RX ADMIN — PREGABALIN 1000 MICROGRAM(S): 225 CAPSULE ORAL at 11:51

## 2022-08-23 RX ADMIN — Medication 1 SUPPOSITORY(S): at 08:37

## 2022-08-23 NOTE — PROVIDER CONTACT NOTE (OTHER) - ACTION/TREATMENT ORDERED:
PT Goals( to achieve in 2 weeks);  Pt mod. independent with transfers.  Pt mod I with amb with RW X 300 feet. Up and Down 12 steps with 1 rail independently.

## 2022-08-23 NOTE — ED ADULT NURSE REASSESSMENT NOTE - GENERAL PATIENT STATE
comfortable appearance
comfortable appearance/resting/sleeping
comfortable appearance/cooperative/resting/sleeping/smiling/interactive
comfortable appearance/cooperative/resting/sleeping

## 2022-08-23 NOTE — ED CDU PROVIDER SUBSEQUENT DAY NOTE - PROGRESS NOTE DETAILS
I spoke to daughter who reports patient had recent MRI/MRA with neurologist office and states aneurysm clippings are compatible.  I personally spoke to office [phone], who will fax over recent report.  I spoke with radiologist SUDHA, advises copy of result FAX to radiology department at [phone]. Pt assessed at bedside, states she is feeling better at this time, daughter at bedside  Informed of elevated TSH and need to f/u with PMD   MRI is pending at this time, spoke to MRI tech who confirms they have approval for MRI

## 2022-08-23 NOTE — PHYSICAL THERAPY INITIAL EVALUATION ADULT - HEALTH SCREEN CRITERIA
Problem: Patient Care Overview  Goal: Plan of Care Review  Outcome: Ongoing (interventions implemented as appropriate)  Patient on RA, no respiratory distress noted. Will continue to monitor.         yes

## 2022-08-23 NOTE — PHYSICAL THERAPY INITIAL EVALUATION ADULT - ADDITIONAL COMMENTS
Pt lives with , who is disabled.  Lives in a 2 story house with 6 steps to enter.  Independent with all PTA, without devices.  Has a RW.

## 2022-08-23 NOTE — ED ADULT NURSE REASSESSMENT NOTE - NURSING MUSC EXTREMITY NORMAL ROM
left upper extremity/right upper extremity/left lower extremity/right lower extremity
left upper extremity/right upper extremity/left lower extremity/right lower extremity

## 2022-08-23 NOTE — ED ADULT NURSE REASSESSMENT NOTE - COMFORT CARE
meal provided/plan of care explained/po fluids offered/repositioned/wait time explained/warm blanket provided
darkened lights/meal provided/plan of care explained/po fluids offered/repositioned/wait time explained

## 2022-08-24 VITALS
OXYGEN SATURATION: 95 % | DIASTOLIC BLOOD PRESSURE: 73 MMHG | TEMPERATURE: 98 F | SYSTOLIC BLOOD PRESSURE: 146 MMHG | RESPIRATION RATE: 18 BRPM | HEART RATE: 83 BPM

## 2022-08-24 PROCEDURE — 99217: CPT

## 2022-08-24 PROCEDURE — 81001 URINALYSIS AUTO W/SCOPE: CPT

## 2022-08-24 PROCEDURE — 71045 X-RAY EXAM CHEST 1 VIEW: CPT

## 2022-08-24 PROCEDURE — 0225U NFCT DS DNA&RNA 21 SARSCOV2: CPT

## 2022-08-24 PROCEDURE — 84484 ASSAY OF TROPONIN QUANT: CPT

## 2022-08-24 PROCEDURE — 93005 ELECTROCARDIOGRAM TRACING: CPT

## 2022-08-24 PROCEDURE — 70496 CT ANGIOGRAPHY HEAD: CPT | Mod: MA

## 2022-08-24 PROCEDURE — 70551 MRI BRAIN STEM W/O DYE: CPT | Mod: 26,MA

## 2022-08-24 PROCEDURE — 99285 EMERGENCY DEPT VISIT HI MDM: CPT | Mod: 25

## 2022-08-24 PROCEDURE — 36415 COLL VENOUS BLD VENIPUNCTURE: CPT

## 2022-08-24 PROCEDURE — G0378: CPT

## 2022-08-24 PROCEDURE — 84443 ASSAY THYROID STIM HORMONE: CPT

## 2022-08-24 PROCEDURE — 83735 ASSAY OF MAGNESIUM: CPT

## 2022-08-24 PROCEDURE — 85025 COMPLETE CBC W/AUTO DIFF WBC: CPT

## 2022-08-24 PROCEDURE — 70498 CT ANGIOGRAPHY NECK: CPT | Mod: MA

## 2022-08-24 PROCEDURE — 87086 URINE CULTURE/COLONY COUNT: CPT

## 2022-08-24 PROCEDURE — 82962 GLUCOSE BLOOD TEST: CPT

## 2022-08-24 PROCEDURE — 96374 THER/PROPH/DIAG INJ IV PUSH: CPT

## 2022-08-24 PROCEDURE — 70551 MRI BRAIN STEM W/O DYE: CPT | Mod: MA

## 2022-08-24 PROCEDURE — 80053 COMPREHEN METABOLIC PANEL: CPT

## 2022-08-24 PROCEDURE — 70450 CT HEAD/BRAIN W/O DYE: CPT | Mod: MA

## 2022-08-24 RX ORDER — MECLIZINE HCL 12.5 MG
1 TABLET ORAL
Qty: 40 | Refills: 0
Start: 2022-08-24 | End: 2022-09-02

## 2022-08-24 RX ADMIN — Medication 37.5 MILLIGRAM(S): at 08:35

## 2022-08-24 RX ADMIN — Medication 75 MICROGRAM(S): at 05:55

## 2022-08-24 RX ADMIN — AMLODIPINE BESYLATE 10 MILLIGRAM(S): 2.5 TABLET ORAL at 05:54

## 2022-08-24 RX ADMIN — Medication 1 SUPPOSITORY(S): at 06:04

## 2022-08-24 RX ADMIN — PREGABALIN 1000 MICROGRAM(S): 225 CAPSULE ORAL at 11:49

## 2022-08-24 RX ADMIN — Medication 25 MILLIGRAM(S): at 06:42

## 2022-08-24 RX ADMIN — LORATADINE 10 MILLIGRAM(S): 10 TABLET ORAL at 11:49

## 2022-08-24 RX ADMIN — Medication 1 TABLET(S): at 11:49

## 2022-08-24 RX ADMIN — APIXABAN 5 MILLIGRAM(S): 2.5 TABLET, FILM COATED ORAL at 05:54

## 2022-08-24 RX ADMIN — LOSARTAN POTASSIUM 25 MILLIGRAM(S): 100 TABLET, FILM COATED ORAL at 05:55

## 2022-08-24 NOTE — ED CDU PROVIDER SUBSEQUENT DAY NOTE - PHYSICAL EXAMINATION
Gen: No acute distress, non toxic  HENT: NCAT, Mucous membranes moist, Oropharynx without exudates, uvula midline  Eyes: pink conjunctivae, EOMI, PERRL  CV: RRR, nl s1/s2.  Resp: CTAB, normal rate and effort  GI: Abdomen soft, NT, ND. No rebound, no guarding  Neuro: A&O x 3, CN II-XII intact, sensorimotor intact without deficits, Finger to nose intact without dysmetria, no pronator drift. +Recurrent dizziness upon sitting up.  MSK: No spine or joint tenderness to palpation, Full ROM ext x 4  Skin: No rashes. intact and perfused.
Vital signs noted, see flowsheet.  General: NAD, well appearing and non-toxic.  HEENT: NC/AT. MMM.  Conjunctiva and sclera clear b/l.  EOMI. PERRL.  Neck: Soft and supple, full ROM without pain.  Cardiac: RRR. +S1/S2. Peripheral pulses 2+ and symmetric b/l.   Respiratory: Speaking in full sentences, no evidence of respiratory distress. Lungs CTA b/l, no wheezes/rhonchi/rales/stridor.   Abdomen: BSx4. Soft, NTND. No guarding or rebound tenderness. No suprapubic tenderness.  Back: Spine midline and non-tender. No CVAT.  Skin: Normal color for race, no evidence of rash, ecchymosis, cyanosis or jaundice.   Neuro: Awake, alert and oriented to person/place/time/situation. Moves all extremities spontaneously and symmetrically.  No focal deficit  Psych: Normal affect
Gen: No acute distress, non toxic  HENT: NCAT, Mucous membranes moist, Oropharynx without exudates, uvula midline  Eyes: pink conjunctivae, EOMI, PERRL  CV: RRR, nl s1/s2.  Resp: CTAB, normal rate and effort  GI: Abdomen soft, NT, ND. No rebound, no guarding  Neuro: A&O x 3, CN II-XII intact, sensorimotor intact without deficits, Finger to nose intact without dysmetria, no pronator drift. +Recurrent dizziness upon sitting up.  MSK: No spine or joint tenderness to palpation, Full ROM ext x 4  Skin: No rashes. intact and perfused.

## 2022-08-24 NOTE — ED CDU PROVIDER SUBSEQUENT DAY NOTE - WR ORDER NAME 1
Xray Chest 1 View- PORTABLE-Urgent

## 2022-08-24 NOTE — ED CDU PROVIDER SUBSEQUENT DAY NOTE - ATTENDING APP SHARED VISIT CONTRIBUTION OF CARE
83 y/o F with PMH vertigo and cerebral aneurysm s/p clipping initially with lightheadedness and nausea, now improved, pending MRI at this time, neuro intact.
Pt in observation for stroke protocol  doing well  decreased dizziness and able to ambulate  mr neg  safe to dc
pt with vertigo awaiting clearance for mr of brain; continue meclizine and valium

## 2022-08-24 NOTE — ED ADULT NURSE REASSESSMENT NOTE - VOIDING
without difficulty
English
purewick in place when received/without difficulty
new [place] reattched to patient. no s/s of redness of breakdown/without difficulty

## 2022-08-24 NOTE — ED CDU PROVIDER DISPOSITION NOTE - PATIENT PORTAL LINK FT
You can access the FollowMyHealth Patient Portal offered by [facility] by registering at the following website: [email or web address]. By joining [employer]’s FollowMyHealth portal, you will also be able to view your health information using other applications (apps) compatible with our system.

## 2022-08-24 NOTE — ED ADULT NURSE REASSESSMENT NOTE - NEURO BEHAVIOR
calm
dizzy/calm
calm
calm
calm/cooperative
dizzy with movement/calm
calm/cooperative
calm
calm/cooperative
calm/cooperative

## 2022-08-24 NOTE — ED CDU PROVIDER SUBSEQUENT DAY NOTE - HISTORY
No events. No pertinent interval history. Vital signs remained stable overnight. Received no calls by RN overnight.
positional dizziness   pending MRI   need report from [facility] that coils are MRI compatible
awaiting MRI

## 2022-08-24 NOTE — ED ADULT NURSE REASSESSMENT NOTE - ED CARDIAC RHYTHM
regular
nsr at a rate of 72 on telemetry box as per ed monitor tech/regular
connected to cardiac monitor/regular
regular

## 2022-08-24 NOTE — ED ADULT NURSE REASSESSMENT NOTE - NEURO SENSATION
sensory intact

## 2022-08-24 NOTE — ED ADULT NURSE REASSESSMENT NOTE - GLASGOW COMA SCALE: EYE OPENING
(E4) spontaneous

## 2022-08-24 NOTE — ED CDU PROVIDER SUBSEQUENT DAY NOTE - WR INTERPRETATION 1
CXR negative - No CHF, No cardiomegaly, No pleural effusions

## 2022-08-24 NOTE — ED CDU PROVIDER DISPOSITION NOTE - NSFOLLOWUPINSTRUCTIONS_ED_ALL_ED_FT
Home and rest  Antivert(Meclizine) one 25 milligram pill every 6 hours for dizziness  Follow up with your neurology physician.  Return for any changes in your condition like inability to walk because of severe dizziness, or any concerns  Continue your home medications  Please make an appointment with the vestibular clinic. Our , Claudia Cheng, will reach out to you to help set that up.

## 2022-08-24 NOTE — ED ADULT NURSE REASSESSMENT NOTE - NURSING MUSC ROM
- - -
full range of motion in all extremities
- - -
full range of motion in all extremities
full range of motion in all extremities

## 2022-08-24 NOTE — ED ADULT NURSE REASSESSMENT NOTE - NS ED NURSE REASSESS COMMENT FT1
Assumed care of patient from previous RN. PT is A&Ox4, RR even and unlabored. Skin is warm and dry. Purewick in place and no signs of skin break down at this time. PT denies any complaints of nausea, lightheadedness or dizziness. On CM in NSR. Daughter at bedside. Updated on plan for MRI wither tonight or tomorrow morning.
PT going to MRI now.
assumed care of patient from Eloisa LOPEZ at 0730. patient current at mri test. will assess when returned.
care of patient endorsed to Eloisa LOPEZ. patient not showing s/s of acute distress. medications given as per orders. patient awaiting MRI test. patient in understanding of plan of care. RN with no further questions
per MD, okay for pt to eat. pt provided sandwich and juice, tolerating without any difficulty.
pt a+ox3, sitting comfortably in bed. reports return of dizziness when sitting up. MD made aware, orders noted and initiated.
Report received at 1900. Patient seen and assessed at bedside. Patient is A&Ox4. Pt in no apparent distress. Pt denies pain. PIV noted, intact & WNL. Plan of care reviewed. Pt pending MRI, Call bell within reach. Safety maintained. Will continue to closely monitor.
assumed care of patient at 0730from cheryl martins. patient aox4, on room air. no s/s of acute distress at this time. patient connect to cardiac monitor and , nsr at a rate of 74. no neuro deficits noted by rn. vs as charted. no complaints of nausea, lightheadedness, or dizziness and this time. pending physical therapy evaluation and mri of the head. patient is able to voice concerns. call be in reach and encouraged to use when assistance is needed.
Pt resting in bed, No distress noted
Assumed care to pt, received report from Kaela LOPEZ, pt resting in bed with even and unlabored breathing. Pt AOX4. denies complaints at this time. Will continue to monitor pt
Resting in stretcher no dizziness or nausea. Neuro intact.
PT awake and alert and had 2 BMs. PT changed and Purewick changed. No signs of skin break down.  While having BM pt became dizzy and lightheaded but without nausea. Morning medications and PRN medications administered. PT awaiting MRI this am.
PT resting on stretcher and repositioned. Reports some dizziness when changing positions. No nausea or vomiting. Medicated per orders with Meclozine.
neuro at baseline, no acute changes.
patient received back from mri at 0815. aox4, on room air. connected to telemetry box and is nsr as per ed monitor tech. no s/s of acute distress. vs as charted. denies lightheadedness/dizziness at this time. neuro check preformed, no neuro deficits indicated by RN. breakfast provided. awaiting mri results. patient able to voice concerns. call bell within reach and is encouraged to use when assistance is needed.
assumed care of patient from cheryl murdock at 1855. patient aox4, on room air. connected to cardiac monitor at a rate of 66 in nsr. awaiting mri of head and physical therapy evaluation. no s/s of distress noted. vss. report given to oncoming nurse.

## 2022-08-24 NOTE — ED ADULT NURSE REASSESSMENT NOTE - NURSING MUSC STRENGTH
limitations in strength
limitations in strength
hand grasp, leg strength strong and equal bilaterally
limitations in strength
hand grasp, leg strength strong and equal bilaterally

## 2022-08-24 NOTE — ED ADULT NURSE REASSESSMENT NOTE - NEURO GAIT
requires assistance
steady

## 2022-08-24 NOTE — ED ADULT NURSE REASSESSMENT NOTE - NURSING NEURO LEVEL OF CONSCIOUSNESS
alert and awake
alert and awake/follows commands
alert and awake
alert and awake/follows commands
alert and awake
alert and awake/follows commands
alert and awake

## 2022-08-24 NOTE — ED ADULT NURSE REASSESSMENT NOTE - GLASGOW COMA SCALE: BEST MOTOR RESPONSE
(M6) obeys commands

## 2022-08-24 NOTE — ED CDU PROVIDER SUBSEQUENT DAY NOTE - NSICDXFAMILYHX_GEN_ALL_CORE_FT
FAMILY HISTORY:  Grandparent  Still living? Unknown  Family history of stroke, Age at diagnosis: Age Unknown    

## 2022-08-24 NOTE — PROVIDER CONTACT NOTE (OTHER) - ASSESSMENT
Rolling walker Rx uploaded and faxed to [facility], waiting on insurance verification.
This CM was made aware by the nurse the patient left. This CM spoke with [place] from [facility] who stated [facility] will deliver the rolling walker to the home.
Pt with 0/10 pain before, during or after RX.  Pt will benefit from PT to maximize functional independence. Will continue to follow.  Pt left sitting OOB in chair in no apparent distress and call bell within reach. Nurse aware

## 2022-08-24 NOTE — ED ADULT NURSE REASSESSMENT NOTE - GLASGOW COMA SCALE: BEST VERBAL RESPONSE
(V5) oriented

## 2022-08-24 NOTE — ED CDU PROVIDER DISPOSITION NOTE - CLINICAL COURSE
Patient presented to ER with dizziness. Stroke work up ordered as patient has significant  history for cerebral aneurysm with coiling. Improved with medical therapy and MR is not showing acute pathological changes. Patient feels improved and is able to ambulate. Patient is able to be safely discharged home

## 2022-08-24 NOTE — ED ADULT NURSE REASSESSMENT NOTE - NURSING NEURO ORIENTATION
oriented to person, place and time

## 2022-08-24 NOTE — ED CDU PROVIDER SUBSEQUENT DAY NOTE - MEDICAL DECISION MAKING DETAILS
83 yo female PMHx brain aneurysm s/p coliling, migraine, CAD, HTN, HLD, A-fib, pre-DM, hypothyroidism with intermittent dizziness. Pending MRI/MRA.
pending MRI
pending MRI   positional dizziness   standing orders for meclizine and valium
Yes

## 2023-05-17 ENCOUNTER — EMERGENCY (EMERGENCY)
Facility: HOSPITAL | Age: 83
LOS: 1 days | Discharge: HOME | End: 2023-05-17
Attending: STUDENT IN AN ORGANIZED HEALTH CARE EDUCATION/TRAINING PROGRAM
Payer: COMMERCIAL

## 2023-05-17 VITALS
HEART RATE: 67 BPM | RESPIRATION RATE: 18 BRPM | DIASTOLIC BLOOD PRESSURE: 72 MMHG | TEMPERATURE: 98 F | OXYGEN SATURATION: 98 % | SYSTOLIC BLOOD PRESSURE: 145 MMHG

## 2023-05-17 VITALS
TEMPERATURE: 98 F | SYSTOLIC BLOOD PRESSURE: 136 MMHG | DIASTOLIC BLOOD PRESSURE: 67 MMHG | WEIGHT: 160.06 LBS | OXYGEN SATURATION: 97 % | RESPIRATION RATE: 16 BRPM | HEART RATE: 62 BPM

## 2023-05-17 DIAGNOSIS — Z98.89 OTHER SPECIFIED POSTPROCEDURAL STATES: Chronic | ICD-10-CM

## 2023-05-17 DIAGNOSIS — H26.9 UNSPECIFIED CATARACT: Chronic | ICD-10-CM

## 2023-05-17 DIAGNOSIS — Z90.710 ACQUIRED ABSENCE OF BOTH CERVIX AND UTERUS: Chronic | ICD-10-CM

## 2023-05-17 PROCEDURE — 99284 EMERGENCY DEPT VISIT MOD MDM: CPT

## 2023-05-17 PROCEDURE — 70450 CT HEAD/BRAIN W/O DYE: CPT | Mod: MA

## 2023-05-17 PROCEDURE — 70450 CT HEAD/BRAIN W/O DYE: CPT | Mod: 26,MA

## 2023-05-17 PROCEDURE — 72125 CT NECK SPINE W/O DYE: CPT | Mod: 26,MA

## 2023-05-17 PROCEDURE — 72125 CT NECK SPINE W/O DYE: CPT | Mod: MA

## 2023-05-17 PROCEDURE — 99284 EMERGENCY DEPT VISIT MOD MDM: CPT | Mod: 25

## 2023-05-17 RX ORDER — ACETAMINOPHEN 500 MG
975 TABLET ORAL ONCE
Refills: 0 | Status: COMPLETED | OUTPATIENT
Start: 2023-05-17 | End: 2023-05-17

## 2023-05-17 RX ORDER — METHOCARBAMOL 500 MG/1
750 TABLET, FILM COATED ORAL ONCE
Refills: 0 | Status: COMPLETED | OUTPATIENT
Start: 2023-05-17 | End: 2023-05-17

## 2023-05-17 RX ORDER — METHOCARBAMOL 500 MG/1
1 TABLET, FILM COATED ORAL
Qty: 12 | Refills: 0
Start: 2023-05-17 | End: 2023-05-19

## 2023-05-17 RX ADMIN — Medication 975 MILLIGRAM(S): at 14:54

## 2023-05-17 RX ADMIN — METHOCARBAMOL 750 MILLIGRAM(S): 500 TABLET, FILM COATED ORAL at 14:54

## 2023-05-17 NOTE — ED PROVIDER NOTE - NSFOLLOWUPINSTRUCTIONS_ED_ALL_ED_FT
- Follow up with your doctor within 2-3 days.   - Return to the ED for any new or worsening symptoms   - Avoid heavy lifting, significant exertion  - Apply heating pads/warm compresses to affected area while resting   - May follow-up with spine center listed for further evaluation if symptoms persist    Strain    A strain is a stretch or tear in one of the muscles in your body. This is caused by an injury to the area such as a twisting mechanism. Symptoms include pain, swelling, or bruising. Rest that area over the next several days and slowly resume activity when tolerated. Ice can help with swelling and pain.     SEEK IMMEDIATE MEDICAL CARE IF YOU HAVE ANY OF THE FOLLOWING SYMPTOMS: worsening pain, inability to move that body part, numbness or tingling.
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
## 2023-05-17 NOTE — ED PROVIDER NOTE - PHYSICAL EXAMINATION
Gen: No acute distress, non toxic  HENT: NCAT, Mucous membranes moist, Oropharynx without exudates, uvula midline  Eyes: pink conjunctivae, EOMI, PERRL  CV: RRR, nl s1/s2.  Resp: CTAB, normal rate and effort  Neuro: A&O x 3, CN II-XII intact, sensorimotor intact without deficits   MSK: +Midline c-spine ttp over C6/7. Full ROM ext x 4. +TTP along trapezius b/l.   Skin: No rashes. intact and perfused. No ecchymosis or seat belt sign

## 2023-05-17 NOTE — ED PROVIDER NOTE - PATIENT PORTAL LINK FT
You can access the FollowMyHealth Patient Portal offered by [facility] by registering at the following website: [email or web address]. By joining [facility]’s FollowMyHealth portal, you will also be able to view your health information using other applications (apps) compatible with our system.
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
## 2023-05-17 NOTE — ED ADULT NURSE NOTE - NSFALLHARMRISKINTERV_ED_ALL_ED
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
## 2023-08-01 NOTE — PHYSICAL THERAPY INITIAL EVALUATION ADULT - BALANCE DISTURBANCE, SYSTEM IMPAIRMENT CONTRIBUTE, REHAB EVAL
Return to the emergency department with worsening symptoms, uncontrolled pain, inability to tolerate oral liquids, fever greater than 105øF not controlled by Tylenol and ibuprofen or as needed with emergent concerns.    
vestibular/musculoskeletal

## 2023-10-19 NOTE — ED CDU PROVIDER SUBSEQUENT DAY NOTE - DATA REVIEWED, MDM
vital signs/nurses notes Zygomaticofacial Flap Text: Given the location of the defect, shape of the defect and the proximity to free margins a zygomaticofacial flap was deemed most appropriate for repair. Using a sterile surgical marker, the appropriate flap was drawn incorporating the defect and placing the expected incisions within the relaxed skin tension lines where possible. The area thus outlined was incised deep to adipose tissue with a #15 scalpel blade with preservation of a vascular pedicle.  The skin margins were undermined to an appropriate distance in all directions utilizing iris scissors. The flap was then carried over into the defect and anchored with interrupted buried subcutaneous sutures.

## 2023-11-28 ENCOUNTER — EMERGENCY (EMERGENCY)
Facility: HOSPITAL | Age: 83
LOS: 1 days | Discharge: HOME | End: 2023-11-28
Attending: EMERGENCY MEDICINE
Payer: MEDICARE

## 2023-11-28 VITALS
RESPIRATION RATE: 18 BRPM | OXYGEN SATURATION: 100 % | WEIGHT: 139.99 LBS | DIASTOLIC BLOOD PRESSURE: 67 MMHG | HEIGHT: 63 IN | HEART RATE: 85 BPM | SYSTOLIC BLOOD PRESSURE: 173 MMHG

## 2023-11-28 DIAGNOSIS — Z98.89 OTHER SPECIFIED POSTPROCEDURAL STATES: Chronic | ICD-10-CM

## 2023-11-28 DIAGNOSIS — Z90.710 ACQUIRED ABSENCE OF BOTH CERVIX AND UTERUS: Chronic | ICD-10-CM

## 2023-11-28 DIAGNOSIS — H26.9 UNSPECIFIED CATARACT: Chronic | ICD-10-CM

## 2023-11-28 PROCEDURE — 70486 CT MAXILLOFACIAL W/O DYE: CPT | Mod: MA

## 2023-11-28 PROCEDURE — 99284 EMERGENCY DEPT VISIT MOD MDM: CPT | Mod: 25

## 2023-11-28 PROCEDURE — 90471 IMMUNIZATION ADMIN: CPT

## 2023-11-28 PROCEDURE — 90715 TDAP VACCINE 7 YRS/> IM: CPT

## 2023-11-28 PROCEDURE — 72125 CT NECK SPINE W/O DYE: CPT | Mod: 26,MA

## 2023-11-28 PROCEDURE — 70450 CT HEAD/BRAIN W/O DYE: CPT | Mod: 26,MA

## 2023-11-28 PROCEDURE — 73120 X-RAY EXAM OF HAND: CPT

## 2023-11-28 PROCEDURE — 70450 CT HEAD/BRAIN W/O DYE: CPT | Mod: MA

## 2023-11-28 PROCEDURE — 72125 CT NECK SPINE W/O DYE: CPT | Mod: MA

## 2023-11-28 PROCEDURE — 99285 EMERGENCY DEPT VISIT HI MDM: CPT

## 2023-11-28 PROCEDURE — 70486 CT MAXILLOFACIAL W/O DYE: CPT | Mod: 26,MA

## 2023-11-28 PROCEDURE — 73120 X-RAY EXAM OF HAND: CPT | Mod: 26,RT

## 2023-11-28 RX ORDER — TETANUS TOXOID, REDUCED DIPHTHERIA TOXOID AND ACELLULAR PERTUSSIS VACCINE, ADSORBED 5; 2.5; 8; 8; 2.5 [IU]/.5ML; [IU]/.5ML; UG/.5ML; UG/.5ML; UG/.5ML
0.5 SUSPENSION INTRAMUSCULAR ONCE
Refills: 0 | Status: COMPLETED | OUTPATIENT
Start: 2023-11-28 | End: 2023-11-28

## 2023-11-28 RX ORDER — ACETAMINOPHEN 500 MG
975 TABLET ORAL ONCE
Refills: 0 | Status: COMPLETED | OUTPATIENT
Start: 2023-11-28 | End: 2023-11-28

## 2023-11-28 RX ADMIN — Medication 975 MILLIGRAM(S): at 19:06

## 2023-11-28 RX ADMIN — TETANUS TOXOID, REDUCED DIPHTHERIA TOXOID AND ACELLULAR PERTUSSIS VACCINE, ADSORBED 0.5 MILLILITER(S): 5; 2.5; 8; 8; 2.5 SUSPENSION INTRAMUSCULAR at 19:06

## 2023-11-28 NOTE — ED PROVIDER NOTE - PATIENT PORTAL LINK FT
You can access the FollowMyHealth Patient Portal offered by [facility] by registering at the following website: [email or web address]. By joining [facility]’s FollowMyHealth portal, you will also be able to view your health information using other applications (apps) compatible with our system.

## 2023-11-28 NOTE — ED PROVIDER NOTE - CARE PROVIDER_API CALL
Nayan Lowe  Otolaryngology  [address] [place] [address], [address]  [place] [address]  Phone: [phone]  Fax: [phone]  Follow Up Time:

## 2023-11-28 NOTE — ED PROVIDER NOTE - CARE PLAN
1 Principal Discharge DX:	Nasal fracture  Secondary Diagnosis:	Facial laceration   Principal Discharge DX:	Nasal fracture  Secondary Diagnosis:	Facial laceration  Secondary Diagnosis:	Bleeding nose
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
## 2023-11-28 NOTE — ED PROVIDER NOTE - OBJECTIVE STATEMENT
pt was walking toward main entrance and tripped then fell on her face  no loc n v visual changes  not on blood thinners  soc no cig +
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
## 2023-11-28 NOTE — ED ADULT NURSE REASSESSMENT NOTE - NS ED NURSE REASSESS COMMENT FT1
Assumed care of pt at 19:15 as stated in report from JESSICA peralta Charting as noted. Patient A&O x4, denies pain/discomfort, denies CP/SOB. Updated on the plan of care. Call bell within reach, bed locked in lowest position. IV site flushed w/ NS. No redness, swelling or pain noted to site. No signs of acute distress noted, safety maintained. bleeding is controlled and pt is comfortable at this time./

## 2023-11-28 NOTE — ED ADULT NURSE NOTE - NSICDXPASTMEDICALHX_GEN_ALL_CORE_FT
PAST MEDICAL HISTORY:  C. difficile colitis     Cerebral aneurysm s/p clipping and coiling    Diverticulitis     Hyperlipidemia     Hypertension     Hypertension     Hypothyroid     Hypothyroidism     Seasonal allergies     Stroke     Vertigo     
None

## 2023-11-28 NOTE — ED PROVIDER NOTE - ATTENDING APP SHARED VISIT CONTRIBUTION OF CARE
s/p trip and fall  pe as documented  agree w imaging and meds  agree w eval and mngt  I, Mayela Ramos, performed the initial face to face bedside interview with this patient regarding history of present illness, review of symptoms and relevant past medical, social and family history.  I completed an independent physical examination.  I was the initial provider who evaluated this patient. I have signed out the follow up of any pending tests (i.e. labs, radiological studies) to the ACP.  I have communicated the patient’s plan of care and disposition with the ACP.

## 2023-11-28 NOTE — ED PROVIDER NOTE - PHYSICAL EXAMINATION
w/n w/d female mild distress  heent neck supple nt at spinous processes n o step off or deformity  4 cm R forehead contusion swelling nose, 3-4 mm lac bridge of nose  chest nt to comp[ression no sub q emphysema no negrito deformity   abd soft nt   pelvis nt to rock compression  Ext from x 4  skin abrasions, laceration  neuro awake alert oriented person place time

## 2023-11-28 NOTE — ED PROVIDER NOTE - PROGRESS NOTE DETAILS
Cirilo Montes PA-C: At DC pt began having nose, bleed, bleeding controlled with a 5.5 rhino rocket pt so to night team. FRANKY Brown: Received during sign out pending re-evaluation. No further bleeding. Was occasional trickling, but appears to have stopped. No blood in back of throat. Medically stable for discharge.

## 2023-11-28 NOTE — ED ADULT NURSE NOTE - NSFALLRISKINTERV_ED_ALL_ED

## 2023-11-28 NOTE — ED PROVIDER NOTE - NSFOLLOWUPINSTRUCTIONS_ED_ALL_ED_FT
keep stitches clean and dry  stitches should come out in 5-6 days  return immediately for any problems or changes in your condition keep stitches clean and dry  stitches should come out in 5-6 days  return immediately for any problems or changes in your condition    Patient education: Stitches and staples (The Basics)  Written by the doctors and editors at [facility]  Please read the Disclaimer at the end of this page.    What are stitches?  Stitches are a way doctors can close certain types of cuts. A doctor uses a special needle and thread to put in stitches. They sew the edges of the cut together and tie knots to hold the stitches in place (figure 1). The term doctors use for stitches is "sutures."    There are 2 main types of stitches:    ?Absorbable – These stitches dissolve over time. They do not need to be taken out.    ?Non-absorbable – These stitches need to be taken out after a certain amount of time. They do not dissolve.    In some cases, like if you have a very deep cut, your doctor might give you stitches plus something called "skin glue" or "tissue adhesive."    What are staples?  Another way doctors can close cuts is with staples. Staples that go in the body are different from those used on paper. To put staples in, doctors use a special stapler (figure 2). Staples need to be taken out after a certain amount of time, just like non-absorbable stitches.    How do I know if I need stitches or staples?  A doctor or nurse will have to look at your cut to decide. In general, you will need stitches or staples if your cut is wide, jagged, or goes deep enough through your skin. A cut will heal on its own without stitches or staples, but they help a cut heal faster and leave less of a scar.    Minor cuts and scrapes that do not go very deep usually do not need stitches. If you get a cut and don't know if you need stitches, check with your doctor or nurse.    What happens when I get stitches or staples?  Before the doctor stitches or staples your cut, they will clean out the cut well. They will also give you numbing medicine so that you don't feel pain when the stitches or staples go in.    After the doctor stitches or staples your cut, they will cover the area with gauze or a bandage.    Why is it important to take care of my stitches or staples?  It's important to take care of your stitches or staples so that your cut heals well and doesn't get infected.    How do I take care of my stitches or staples?  Your doctor or nurse will give you specific instructions, depending on the type of stitches you have and where they are. Staples need the same type of care as non-absorbable stitches.    Here is some general advice:    ?Keep your stitches or staples dry and covered with a bandage. Non-absorbable stitches and staples need to be kept dry for 1 to 2 days. Absorbable stitches sometimes need to be kept dry longer. Your doctor or nurse will tell you exactly how long to keep your stitches dry.    ?Once you no longer need to keep your stitches or staples dry, gently wash them with soap and water whenever you take a shower. Do not put your stitches or staples underwater, such as in a bath, pool, or lake. This can slow down healing and raise your chance of getting an infection.    ?After you wash your stitches or staples, pat them dry and put an antibiotic ointment on them    ?Cover your stitches or staples with a bandage or gauze, unless your doctor or nurse tells you not to    ?Avoid activities or sports that could hurt the area of your stitches or staples for 1 to 2 weeks. (Your doctor or nurse will tell you exactly how long to avoid these activities.) If you hurt the same part of your body again, stitches can break, and the cut can open up again.    When should I call the doctor or nurse?  Call your doctor or nurse if:    ?Your stitches break or the cut opens up again    ?You get a fever    ?You have redness or swelling around the cut, or pus drains from the cut. It is normal for clear yellow fluid to drain from the cut in the first few days.    When will my stitches or staples be taken out?  The doctor who puts in the stitches or staples will tell you when to see your doctor or nurse to have them taken out. Non-absorbable stitches usually stay in for 5 to 14 days, depending on where they are. Staples usually stay in for 7 to 10 days.    Staples need to be taken out with a special staple remover. But doctors' offices don't always have this device. The doctor who puts in your staples might give you a staple remover. If so, bring it to your doctor's office when you have your staples taken out.    What should I do after my stitches or staples are out?  After your stitches or staples are out, you should protect the scar from the sun. Use sunscreen on the area or wear clothes or a hat that covers the scar.    Your doctor or nurse might also recommend that you use certain lotions or creams to help your scar heal.

## 2023-12-01 ENCOUNTER — APPOINTMENT (OUTPATIENT)
Dept: OTOLARYNGOLOGY | Facility: CLINIC | Age: 83
End: 2023-12-01
Payer: MEDICARE

## 2023-12-01 VITALS
HEART RATE: 79 BPM | WEIGHT: 170 LBS | BODY MASS INDEX: 26.68 KG/M2 | HEIGHT: 67 IN | DIASTOLIC BLOOD PRESSURE: 62 MMHG | SYSTOLIC BLOOD PRESSURE: 120 MMHG

## 2023-12-01 PROCEDURE — 99204 OFFICE O/P NEW MOD 45 MIN: CPT | Mod: 25

## 2023-12-01 PROCEDURE — 31231 NASAL ENDOSCOPY DX: CPT

## 2023-12-01 RX ORDER — RIVAROXABAN 20 MG/1
20 TABLET, FILM COATED ORAL
Qty: 90 | Refills: 3 | Status: COMPLETED | COMMUNITY
Start: 2022-05-12 | End: 2023-12-01

## 2023-12-01 RX ORDER — APIXABAN 5 MG/1
TABLET, FILM COATED ORAL
Refills: 0 | Status: ACTIVE | COMMUNITY

## 2023-12-01 RX ORDER — DONEPEZIL HYDROCHLORIDE 23 MG/1
TABLET, FILM COATED ORAL
Refills: 0 | Status: ACTIVE | COMMUNITY

## 2023-12-01 RX ORDER — MONTELUKAST SODIUM 10 MG/1
TABLET, FILM COATED ORAL
Refills: 0 | Status: ACTIVE | COMMUNITY

## 2023-12-01 RX ORDER — MULTIVIT-MIN/IRON/FOLIC ACID/K 18-600-40
CAPSULE ORAL
Refills: 0 | Status: ACTIVE | COMMUNITY

## 2023-12-24 NOTE — ED PROCEDURE NOTE - CPROC ED TIME OUT STATEMENT1
“Patient's name, , procedure and correct site were confirmed during the [facility] Timeout.” “Patient's name, , procedure and correct site were confirmed during the [facility] Timeout.”

## 2024-01-04 PROBLEM — R04.0 EPISTAXIS: Status: ACTIVE | Noted: 2023-12-01

## 2024-01-05 ENCOUNTER — APPOINTMENT (OUTPATIENT)
Dept: OTOLARYNGOLOGY | Facility: CLINIC | Age: 84
End: 2024-01-05

## 2024-01-05 DIAGNOSIS — R04.0 EPISTAXIS: ICD-10-CM

## 2024-01-18 NOTE — PROCEDURE
[[email or web address]] : Nasal Endoscopy: Procedure: Bilateral nasal endoscopy (CPT 62425)   Indication: Anterior rhinoscopy was inadequate to evaluate pathology.  Left: Septum deviated R Packing removed, no epistaxis Moderate inferior turbinate hypertrophy  Middle meatus clear, without masses, polyps, or pus Sphenoethmoidal recess clear, without masses, polyps, or pus  Right:  Septum deviated R Moderate inferior turbinate hypertrophy Middle meatus clear, without masses, polyps, or pus  Sphenoethmoidal recess clear, without masses, polyps, or pus
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
## 2024-01-18 NOTE — PHYSICAL EXAM
[de-identified] : L rapid rhino in place, removed without further bleeding. Dorsum seemingly midline ,tender to palation, significant edema [de-identified] : periorbital ecchymosis

## 2024-01-19 ENCOUNTER — APPOINTMENT (OUTPATIENT)
Dept: OTOLARYNGOLOGY | Facility: CLINIC | Age: 84
End: 2024-01-19

## 2024-04-03 ENCOUNTER — APPOINTMENT (OUTPATIENT)
Dept: CARDIOLOGY | Facility: CLINIC | Age: 84
End: 2024-04-03

## 2024-09-07 ENCOUNTER — EMERGENCY (EMERGENCY)
Facility: HOSPITAL | Age: 84
LOS: 1 days | End: 2024-09-07
Attending: STUDENT IN AN ORGANIZED HEALTH CARE EDUCATION/TRAINING PROGRAM
Payer: MEDICARE

## 2024-09-07 VITALS
RESPIRATION RATE: 16 BRPM | TEMPERATURE: 98 F | OXYGEN SATURATION: 99 % | SYSTOLIC BLOOD PRESSURE: 179 MMHG | DIASTOLIC BLOOD PRESSURE: 83 MMHG | HEART RATE: 62 BPM

## 2024-09-07 VITALS
HEART RATE: 71 BPM | TEMPERATURE: 98 F | SYSTOLIC BLOOD PRESSURE: 166 MMHG | DIASTOLIC BLOOD PRESSURE: 75 MMHG | RESPIRATION RATE: 18 BRPM | OXYGEN SATURATION: 96 %

## 2024-09-07 DIAGNOSIS — H26.9 UNSPECIFIED CATARACT: Chronic | ICD-10-CM

## 2024-09-07 DIAGNOSIS — Z98.89 OTHER SPECIFIED POSTPROCEDURAL STATES: Chronic | ICD-10-CM

## 2024-09-07 DIAGNOSIS — Z90.710 ACQUIRED ABSENCE OF BOTH CERVIX AND UTERUS: Chronic | ICD-10-CM

## 2024-09-07 LAB
BASOPHILS # BLD AUTO: 0.03 K/UL — SIGNIFICANT CHANGE UP (ref 0–0.2)
BASOPHILS NFR BLD AUTO: 0.3 % — SIGNIFICANT CHANGE UP (ref 0–2)
EOSINOPHIL # BLD AUTO: 0 K/UL — SIGNIFICANT CHANGE UP (ref 0–0.5)
EOSINOPHIL NFR BLD AUTO: 0 % — SIGNIFICANT CHANGE UP (ref 0–6)
HCT VFR BLD CALC: 40.6 % — SIGNIFICANT CHANGE UP (ref 34.5–45)
HGB BLD-MCNC: 14.6 G/DL — SIGNIFICANT CHANGE UP (ref 11.5–15.5)
IMM GRANULOCYTES NFR BLD AUTO: 0.4 % — SIGNIFICANT CHANGE UP (ref 0–0.9)
LYMPHOCYTES # BLD AUTO: 1.61 K/UL — SIGNIFICANT CHANGE UP (ref 1–3.3)
LYMPHOCYTES # BLD AUTO: 17.9 % — SIGNIFICANT CHANGE UP (ref 13–44)
MCHC RBC-ENTMCNC: 30.4 PG — SIGNIFICANT CHANGE UP (ref 27–34)
MCHC RBC-ENTMCNC: 36 GM/DL — SIGNIFICANT CHANGE UP (ref 32–36)
MCV RBC AUTO: 84.4 FL — SIGNIFICANT CHANGE UP (ref 80–100)
MONOCYTES # BLD AUTO: 0.53 K/UL — SIGNIFICANT CHANGE UP (ref 0–0.9)
MONOCYTES NFR BLD AUTO: 5.9 % — SIGNIFICANT CHANGE UP (ref 2–14)
NEUTROPHILS # BLD AUTO: 6.79 K/UL — SIGNIFICANT CHANGE UP (ref 1.8–7.4)
NEUTROPHILS NFR BLD AUTO: 75.5 % — SIGNIFICANT CHANGE UP (ref 43–77)
PLATELET # BLD AUTO: 246 K/UL — SIGNIFICANT CHANGE UP (ref 150–400)
RBC # BLD: 4.81 M/UL — SIGNIFICANT CHANGE UP (ref 3.8–5.2)
RBC # FLD: 12.6 % — SIGNIFICANT CHANGE UP (ref 10.3–14.5)
WBC # BLD: 9 K/UL — SIGNIFICANT CHANGE UP (ref 3.8–10.5)
WBC # FLD AUTO: 9 K/UL — SIGNIFICANT CHANGE UP (ref 3.8–10.5)

## 2024-09-07 PROCEDURE — 99284 EMERGENCY DEPT VISIT MOD MDM: CPT | Mod: GC

## 2024-09-07 PROCEDURE — 71045 X-RAY EXAM CHEST 1 VIEW: CPT | Mod: 26

## 2024-09-07 RX ORDER — SODIUM CHLORIDE 9 G/1000ML
1000 INJECTION, SOLUTION INTRAVENOUS ONCE
Refills: 0 | Status: COMPLETED | OUTPATIENT
Start: 2024-09-07 | End: 2024-09-07

## 2024-09-07 RX ORDER — ONDANSETRON HCL/PF 4 MG/2 ML
4 VIAL (ML) INJECTION ONCE
Refills: 0 | Status: COMPLETED | OUTPATIENT
Start: 2024-09-07 | End: 2024-09-07

## 2024-09-07 RX ADMIN — Medication 4 MILLIGRAM(S): at 23:37

## 2024-09-07 RX ADMIN — SODIUM CHLORIDE 1000 MILLILITER(S): 9 INJECTION, SOLUTION INTRAVENOUS at 23:45

## 2024-09-08 LAB
ALBUMIN SERPL ELPH-MCNC: 4.8 G/DL — SIGNIFICANT CHANGE UP (ref 3.3–5.2)
ALP SERPL-CCNC: 114 U/L — SIGNIFICANT CHANGE UP (ref 40–120)
ALT FLD-CCNC: 22 U/L — SIGNIFICANT CHANGE UP
ANION GAP SERPL CALC-SCNC: 17 MMOL/L — SIGNIFICANT CHANGE UP (ref 5–17)
APPEARANCE UR: CLEAR — SIGNIFICANT CHANGE UP
AST SERPL-CCNC: 21 U/L — SIGNIFICANT CHANGE UP
BACTERIA # UR AUTO: NEGATIVE /HPF — SIGNIFICANT CHANGE UP
BILIRUB SERPL-MCNC: 0.4 MG/DL — SIGNIFICANT CHANGE UP (ref 0.4–2)
BILIRUB UR-MCNC: NEGATIVE — SIGNIFICANT CHANGE UP
BUN SERPL-MCNC: 14.5 MG/DL — SIGNIFICANT CHANGE UP (ref 8–20)
CALCIUM SERPL-MCNC: 10.2 MG/DL — SIGNIFICANT CHANGE UP (ref 8.4–10.5)
CAST: 0 /LPF — SIGNIFICANT CHANGE UP (ref 0–4)
CHLORIDE SERPL-SCNC: 96 MMOL/L — SIGNIFICANT CHANGE UP (ref 96–108)
CO2 SERPL-SCNC: 24 MMOL/L — SIGNIFICANT CHANGE UP (ref 22–29)
COLOR SPEC: YELLOW — SIGNIFICANT CHANGE UP
CREAT SERPL-MCNC: 0.99 MG/DL — SIGNIFICANT CHANGE UP (ref 0.5–1.3)
DIFF PNL FLD: NEGATIVE — SIGNIFICANT CHANGE UP
EGFR: 56 ML/MIN/1.73M2 — LOW
EGFR: 56 ML/MIN/1.73M2 — LOW
FLUAV AG NPH QL: SIGNIFICANT CHANGE UP
FLUBV AG NPH QL: SIGNIFICANT CHANGE UP
GLUCOSE SERPL-MCNC: 215 MG/DL — HIGH (ref 70–99)
GLUCOSE UR QL: 250 MG/DL
KETONES UR-MCNC: NEGATIVE MG/DL — SIGNIFICANT CHANGE UP
LEUKOCYTE ESTERASE UR-ACNC: NEGATIVE — SIGNIFICANT CHANGE UP
NITRITE UR-MCNC: NEGATIVE — SIGNIFICANT CHANGE UP
PH UR: 7.5 — SIGNIFICANT CHANGE UP (ref 5–8)
POTASSIUM SERPL-MCNC: 4.2 MMOL/L — SIGNIFICANT CHANGE UP (ref 3.5–5.3)
POTASSIUM SERPL-SCNC: 4.2 MMOL/L — SIGNIFICANT CHANGE UP (ref 3.5–5.3)
PROT SERPL-MCNC: 7.8 G/DL — SIGNIFICANT CHANGE UP (ref 6.6–8.7)
PROT UR-MCNC: 30 MG/DL
RBC CASTS # UR COMP ASSIST: 2 /HPF — SIGNIFICANT CHANGE UP (ref 0–4)
RSV RNA NPH QL NAA+NON-PROBE: SIGNIFICANT CHANGE UP
SARS-COV-2 RNA SPEC QL NAA+PROBE: SIGNIFICANT CHANGE UP
SODIUM SERPL-SCNC: 136 MMOL/L — SIGNIFICANT CHANGE UP (ref 135–145)
SP GR SPEC: 1.01 — SIGNIFICANT CHANGE UP (ref 1–1.03)
SQUAMOUS # UR AUTO: 1 /HPF — SIGNIFICANT CHANGE UP (ref 0–5)
TROPONIN T, HIGH SENSITIVITY RESULT: 10 NG/L — SIGNIFICANT CHANGE UP (ref 0–51)
TROPONIN T, HIGH SENSITIVITY RESULT: 10 NG/L — SIGNIFICANT CHANGE UP (ref 0–51)
UROBILINOGEN FLD QL: 1 MG/DL — SIGNIFICANT CHANGE UP (ref 0.2–1)
WBC UR QL: 1 /HPF — SIGNIFICANT CHANGE UP (ref 0–5)

## 2024-09-08 PROCEDURE — 87077 CULTURE AEROBIC IDENTIFY: CPT

## 2024-09-08 PROCEDURE — 85025 COMPLETE CBC W/AUTO DIFF WBC: CPT

## 2024-09-08 PROCEDURE — 71045 X-RAY EXAM CHEST 1 VIEW: CPT

## 2024-09-08 PROCEDURE — 93010 ELECTROCARDIOGRAM REPORT: CPT

## 2024-09-08 PROCEDURE — 93005 ELECTROCARDIOGRAM TRACING: CPT

## 2024-09-08 PROCEDURE — 80053 COMPREHEN METABOLIC PANEL: CPT

## 2024-09-08 PROCEDURE — 87086 URINE CULTURE/COLONY COUNT: CPT

## 2024-09-08 PROCEDURE — 81001 URINALYSIS AUTO W/SCOPE: CPT

## 2024-09-08 PROCEDURE — 87637 SARSCOV2&INF A&B&RSV AMP PRB: CPT

## 2024-09-08 PROCEDURE — 99285 EMERGENCY DEPT VISIT HI MDM: CPT | Mod: 25

## 2024-09-08 PROCEDURE — 96374 THER/PROPH/DIAG INJ IV PUSH: CPT

## 2024-09-08 PROCEDURE — 84484 ASSAY OF TROPONIN QUANT: CPT

## 2024-09-08 PROCEDURE — 36415 COLL VENOUS BLD VENIPUNCTURE: CPT

## 2024-09-10 LAB
CULTURE RESULTS: SIGNIFICANT CHANGE UP
SPECIMEN SOURCE: SIGNIFICANT CHANGE UP

## 2024-09-12 NOTE — ED ADULT TRIAGE NOTE - PRO INTERPRETER NEED 2
No apparent complications or complaints regarding anesthesia care at this time/All questions were answered
English

## 2024-10-04 NOTE — ED ADULT NURSE NOTE - PSH
Cataract of both eyes    S/P appendectomy    S/P brain surgery    S/P hysterectomy    S/P tonsillectomy English

## 2024-12-02 NOTE — ED ADULT NURSE REASSESSMENT NOTE - NURSING ED SKIN COLOR
Want to Say “Thank You” to a Nurse?  The ZOË Award® was created in memory of MANUEL Moralez by his family to say thank you to nurses who provide an outstanding level of care.    Submit a nomination using any method below.     OR    [email or web address]  Or visit the Resource section   on your [employer] gonzalez      
normal for race

## 2025-04-29 ENCOUNTER — EMERGENCY (EMERGENCY)
Facility: HOSPITAL | Age: 85
LOS: 1 days | End: 2025-04-29
Attending: EMERGENCY MEDICINE
Payer: MEDICARE

## 2025-04-29 VITALS
TEMPERATURE: 97 F | DIASTOLIC BLOOD PRESSURE: 65 MMHG | HEIGHT: 67 IN | RESPIRATION RATE: 18 BRPM | WEIGHT: 149.91 LBS | HEART RATE: 52 BPM | SYSTOLIC BLOOD PRESSURE: 149 MMHG | OXYGEN SATURATION: 98 %

## 2025-04-29 DIAGNOSIS — Z90.710 ACQUIRED ABSENCE OF BOTH CERVIX AND UTERUS: Chronic | ICD-10-CM

## 2025-04-29 DIAGNOSIS — Z95.5 PRESENCE OF CORONARY ANGIOPLASTY IMPLANT AND GRAFT: Chronic | ICD-10-CM

## 2025-04-29 DIAGNOSIS — Z98.89 OTHER SPECIFIED POSTPROCEDURAL STATES: Chronic | ICD-10-CM

## 2025-04-29 DIAGNOSIS — H26.9 UNSPECIFIED CATARACT: Chronic | ICD-10-CM

## 2025-04-29 LAB
ALBUMIN SERPL ELPH-MCNC: 4.3 G/DL — SIGNIFICANT CHANGE UP (ref 3.3–5.2)
ALP SERPL-CCNC: 89 U/L — SIGNIFICANT CHANGE UP (ref 40–120)
ALT FLD-CCNC: 19 U/L — SIGNIFICANT CHANGE UP
ANION GAP SERPL CALC-SCNC: 17 MMOL/L — SIGNIFICANT CHANGE UP (ref 5–17)
APPEARANCE UR: CLEAR — SIGNIFICANT CHANGE UP
APTT BLD: 28.9 SEC — SIGNIFICANT CHANGE UP (ref 26.1–36.8)
AST SERPL-CCNC: 30 U/L — SIGNIFICANT CHANGE UP
BASOPHILS # BLD AUTO: 0.05 K/UL — SIGNIFICANT CHANGE UP (ref 0–0.2)
BASOPHILS NFR BLD AUTO: 0.6 % — SIGNIFICANT CHANGE UP (ref 0–2)
BILIRUB SERPL-MCNC: 0.6 MG/DL — SIGNIFICANT CHANGE UP (ref 0.4–2)
BILIRUB UR-MCNC: NEGATIVE — SIGNIFICANT CHANGE UP
BUN SERPL-MCNC: 23.9 MG/DL — HIGH (ref 8–20)
CALCIUM SERPL-MCNC: 9.5 MG/DL — SIGNIFICANT CHANGE UP (ref 8.4–10.5)
CHLORIDE SERPL-SCNC: 102 MMOL/L — SIGNIFICANT CHANGE UP (ref 96–108)
CO2 SERPL-SCNC: 20 MMOL/L — LOW (ref 22–29)
COLOR SPEC: YELLOW — SIGNIFICANT CHANGE UP
CREAT SERPL-MCNC: 1.18 MG/DL — SIGNIFICANT CHANGE UP (ref 0.5–1.3)
DIFF PNL FLD: NEGATIVE — SIGNIFICANT CHANGE UP
EGFR: 46 ML/MIN/1.73M2 — LOW
EGFR: 46 ML/MIN/1.73M2 — LOW
EOSINOPHIL # BLD AUTO: 0.01 K/UL — SIGNIFICANT CHANGE UP (ref 0–0.5)
EOSINOPHIL NFR BLD AUTO: 0.1 % — SIGNIFICANT CHANGE UP (ref 0–6)
GLUCOSE SERPL-MCNC: 170 MG/DL — HIGH (ref 70–99)
GLUCOSE UR QL: NEGATIVE MG/DL — SIGNIFICANT CHANGE UP
HCT VFR BLD CALC: 39.1 % — SIGNIFICANT CHANGE UP (ref 34.5–45)
HGB BLD-MCNC: 13.4 G/DL — SIGNIFICANT CHANGE UP (ref 11.5–15.5)
IMM GRANULOCYTES # BLD AUTO: 0.04 K/UL — SIGNIFICANT CHANGE UP (ref 0–0.07)
IMM GRANULOCYTES NFR BLD AUTO: 0.5 % — SIGNIFICANT CHANGE UP (ref 0–0.9)
INR BLD: 1.22 RATIO — HIGH (ref 0.85–1.16)
KETONES UR-MCNC: NEGATIVE MG/DL — SIGNIFICANT CHANGE UP
LEUKOCYTE ESTERASE UR-ACNC: NEGATIVE — SIGNIFICANT CHANGE UP
LYMPHOCYTES # BLD AUTO: 1.62 K/UL — SIGNIFICANT CHANGE UP (ref 1–3.3)
LYMPHOCYTES NFR BLD AUTO: 19 % — SIGNIFICANT CHANGE UP (ref 13–44)
MCHC RBC-ENTMCNC: 29.2 PG — SIGNIFICANT CHANGE UP (ref 27–34)
MCHC RBC-ENTMCNC: 34.3 G/DL — SIGNIFICANT CHANGE UP (ref 32–36)
MCV RBC AUTO: 85.2 FL — SIGNIFICANT CHANGE UP (ref 80–100)
MONOCYTES # BLD AUTO: 0.52 K/UL — SIGNIFICANT CHANGE UP (ref 0–0.9)
MONOCYTES NFR BLD AUTO: 6.1 % — SIGNIFICANT CHANGE UP (ref 2–14)
NEUTROPHILS # BLD AUTO: 6.29 K/UL — SIGNIFICANT CHANGE UP (ref 1.8–7.4)
NEUTROPHILS NFR BLD AUTO: 73.7 % — SIGNIFICANT CHANGE UP (ref 43–77)
NITRITE UR-MCNC: NEGATIVE — SIGNIFICANT CHANGE UP
NRBC # BLD AUTO: 0 K/UL — SIGNIFICANT CHANGE UP (ref 0–0)
NRBC # FLD: 0 K/UL — SIGNIFICANT CHANGE UP (ref 0–0)
NRBC BLD AUTO-RTO: 0 /100 WBCS — SIGNIFICANT CHANGE UP (ref 0–0)
PH UR: 6.5 — SIGNIFICANT CHANGE UP (ref 5–8)
PLATELET # BLD AUTO: 222 K/UL — SIGNIFICANT CHANGE UP (ref 150–400)
PMV BLD: 9.5 FL — SIGNIFICANT CHANGE UP (ref 7–13)
POTASSIUM SERPL-MCNC: 4.7 MMOL/L — SIGNIFICANT CHANGE UP (ref 3.5–5.3)
POTASSIUM SERPL-SCNC: 4.7 MMOL/L — SIGNIFICANT CHANGE UP (ref 3.5–5.3)
PROT SERPL-MCNC: 7.3 G/DL — SIGNIFICANT CHANGE UP (ref 6.6–8.7)
PROT UR-MCNC: SIGNIFICANT CHANGE UP MG/DL
PROTHROM AB SERPL-ACNC: 14.1 SEC — HIGH (ref 9.9–13.4)
RBC # BLD: 4.59 M/UL — SIGNIFICANT CHANGE UP (ref 3.8–5.2)
RBC # FLD: 12.8 % — SIGNIFICANT CHANGE UP (ref 10.3–14.5)
SODIUM SERPL-SCNC: 139 MMOL/L — SIGNIFICANT CHANGE UP (ref 135–145)
SP GR SPEC: >1.03 — HIGH (ref 1–1.03)
TROPONIN T, HIGH SENSITIVITY RESULT: 9 NG/L — SIGNIFICANT CHANGE UP (ref 0–51)
UROBILINOGEN FLD QL: 0.2 MG/DL — SIGNIFICANT CHANGE UP (ref 0.2–1)
WBC # BLD: 8.53 K/UL — SIGNIFICANT CHANGE UP (ref 3.8–10.5)
WBC # FLD AUTO: 8.53 K/UL — SIGNIFICANT CHANGE UP (ref 3.8–10.5)

## 2025-04-29 PROCEDURE — 71045 X-RAY EXAM CHEST 1 VIEW: CPT | Mod: 26

## 2025-04-29 PROCEDURE — 70496 CT ANGIOGRAPHY HEAD: CPT | Mod: 26

## 2025-04-29 PROCEDURE — 99291 CRITICAL CARE FIRST HOUR: CPT

## 2025-04-29 PROCEDURE — 0042T: CPT

## 2025-04-29 PROCEDURE — 70498 CT ANGIOGRAPHY NECK: CPT | Mod: 26

## 2025-04-29 PROCEDURE — 93010 ELECTROCARDIOGRAM REPORT: CPT

## 2025-04-29 PROCEDURE — 70450 CT HEAD/BRAIN W/O DYE: CPT | Mod: 26,XU

## 2025-04-29 RX ORDER — METFORMIN HYDROCHLORIDE 850 MG/1
1 TABLET ORAL
Refills: 0 | DISCHARGE

## 2025-04-29 RX ORDER — DIAZEPAM 2 MG/1
5 TABLET ORAL ONCE
Refills: 0 | Status: DISCONTINUED | OUTPATIENT
Start: 2025-04-29 | End: 2025-04-29

## 2025-04-29 RX ORDER — VENLAFAXINE HYDROCHLORIDE 37.5 MG/1
150 CAPSULE, EXTENDED RELEASE ORAL DAILY
Refills: 0 | Status: DISCONTINUED | OUTPATIENT
Start: 2025-04-29 | End: 2025-05-07

## 2025-04-29 RX ORDER — ATORVASTATIN CALCIUM 80 MG/1
10 TABLET, FILM COATED ORAL AT BEDTIME
Refills: 0 | Status: DISCONTINUED | OUTPATIENT
Start: 2025-04-29 | End: 2025-05-07

## 2025-04-29 RX ORDER — LEVOTHYROXINE SODIUM 300 MCG
75 TABLET ORAL DAILY
Refills: 0 | Status: DISCONTINUED | OUTPATIENT
Start: 2025-04-29 | End: 2025-05-07

## 2025-04-29 RX ORDER — DONEPEZIL HYDROCHLORIDE 5 MG/1
20 TABLET ORAL DAILY
Refills: 0 | Status: DISCONTINUED | OUTPATIENT
Start: 2025-04-29 | End: 2025-05-07

## 2025-04-29 RX ORDER — METFORMIN HYDROCHLORIDE 850 MG/1
500 TABLET ORAL AT BEDTIME
Refills: 0 | Status: DISCONTINUED | OUTPATIENT
Start: 2025-04-29 | End: 2025-05-07

## 2025-04-29 RX ORDER — ONDANSETRON HCL/PF 4 MG/2 ML
4 VIAL (ML) INJECTION ONCE
Refills: 0 | Status: COMPLETED | OUTPATIENT
Start: 2025-04-29 | End: 2025-04-29

## 2025-04-29 RX ORDER — VENLAFAXINE HYDROCHLORIDE 37.5 MG/1
1 CAPSULE, EXTENDED RELEASE ORAL
Refills: 0 | DISCHARGE

## 2025-04-29 RX ORDER — ATORVASTATIN CALCIUM 80 MG/1
1 TABLET, FILM COATED ORAL
Refills: 0 | DISCHARGE

## 2025-04-29 RX ORDER — LOSARTAN POTASSIUM 100 MG/1
25 TABLET, FILM COATED ORAL DAILY
Refills: 0 | Status: DISCONTINUED | OUTPATIENT
Start: 2025-04-29 | End: 2025-05-07

## 2025-04-29 RX ORDER — OMEGA-3-ACID ETHYL ESTERS CAPSULES 1 G/1
1 CAPSULE, LIQUID FILLED ORAL
Refills: 0 | DISCHARGE

## 2025-04-29 RX ORDER — AMLODIPINE BESYLATE 10 MG/1
10 TABLET ORAL DAILY
Refills: 0 | Status: DISCONTINUED | OUTPATIENT
Start: 2025-04-29 | End: 2025-05-07

## 2025-04-29 RX ORDER — MONTELUKAST SODIUM 10 MG/1
10 TABLET ORAL DAILY
Refills: 0 | Status: DISCONTINUED | OUTPATIENT
Start: 2025-04-29 | End: 2025-05-07

## 2025-04-29 RX ORDER — APIXABAN 2.5 MG/1
5 TABLET, FILM COATED ORAL EVERY 12 HOURS
Refills: 0 | Status: DISCONTINUED | OUTPATIENT
Start: 2025-04-29 | End: 2025-05-07

## 2025-04-29 RX ADMIN — DIAZEPAM 5 MILLIGRAM(S): 2 TABLET ORAL at 18:24

## 2025-04-29 RX ADMIN — Medication 1000 MILLILITER(S): at 18:24

## 2025-04-29 RX ADMIN — Medication 4 MILLIGRAM(S): at 18:24

## 2025-04-29 NOTE — ED PROVIDER NOTE - PHYSICAL EXAMINATION
Const: Awake, alert and oriented. Diaphoretic, ill appearing.  HEENT: NC/AT. Moist mucous membranes.  Eyes: No scleral icterus. EOMI.  Neck:. Soft and supple. Full ROM without pain.  Cardiac: Regular rate and regular rhythm. +S1/S2. No murmurs. Peripheral pulses 2+ and symmetric. No LE edema.  Resp: Speaking in full sentences. No evidence of respiratory distress. No wheezes, rales or rhonchi.  Abd: Soft, non-tender, non-distended. Normal bowel sounds in all 4 quadrants. No guarding or rebound.  Back: Spine midline and non-tender. No CVAT.  Skin: No rashes, abrasions or lacerations.  Neuro: CN II-XII grossly in tact. Symmetrical smile. PERRL. EOMI. Bilateral and symmetric sensation of face. Tongue midline. Normal finger to nose. Normal rapid alternating movements. No pronator drift. Sensation symmetrically intact bilateral upper and lower extremities.

## 2025-04-29 NOTE — ED ADULT TRIAGE NOTE - PAIN RATING/NUMBER SCALE (0-10): ACTIVITY
Pt presents to ED with c/o non productive cough that started this evening and new onset SOB that started PTA. Pt reports having past dx of bronchitis and states that her cough and SOB is similar to her episodes when MD dx her with Bronchitis. Pt states that last time she felt this way she went to Urgent Care where she was given steroid shot. Pt states that the steroid shot relieved her SOB and cough. Pt placed on 1L O2 via NC for comfort in triage.    6 (moderate pain)

## 2025-04-29 NOTE — ED CDU PROVIDER INITIAL DAY NOTE - NSICDXPASTSURGICALHX_GEN_ALL_CORE_FT
PAST SURGICAL HISTORY:  Cataract of both eyes     S/P appendectomy     S/P brain surgery     S/P hysterectomy     S/P tonsillectomy     Stented coronary artery

## 2025-04-29 NOTE — ED ADULT NURSE NOTE - OBJECTIVE STATEMENT
Pt c/o diaphoresis, n/v, dizziness starting at around 1415 today. Hx of stroke. Currently A&Ox4, able to follow commands. NIH 0. Denies fevers, chills, c, sob, diarrhea Pt c/o diaphoresis, n/v, dizziness starting at around 1415 today. Hx of hemorrhagic stroke and aneurysm in the past. Currently A&Ox4, able to follow commands. NIH 0. Denies fevers, chills, c, sob, diarrhea

## 2025-04-29 NOTE — ED PROVIDER NOTE - CRITICAL CARE ATTENDING CONTRIBUTION TO CARE
Kady ZAMBRANO DO, have personally provided 45 minutes of critical care time exclusive of time spent on separately billable procedures. Time includes review of laboratory data, radiology results, discussion with consultants, and monitoring for potential decompensation. Interventions were performed as documented above.

## 2025-04-29 NOTE — PHARMACOTHERAPY INTERVENTION NOTE - COMMENTS
Spoke to daughter at bedside and referenced outpatient pharmacy fill records to obtain medication list. Outpatient Medication Review updated.    HOME MEDICATIONS:  amLODIPine 10 mg oral tablet: 1 tab(s) orally once a day (29 Apr 2025 19:47)  apixaban 5 mg oral tablet: 1 tab(s) orally every 12 hours (29 Apr 2025 19:47)  ascorbic acid 500 mg oral capsule: 1 cap(s) orally once a day (29 Apr 2025 19:51)  atorvastatin 20 mg oral tablet: 1 tab(s) orally once a day (29 Apr 2025 19:47)  Bystolic 5 mg oral tablet: 1 tab(s) orally once a day in the morning (29 Apr 2025 19:47)  Calcium 600+D 600 mg-200 units oral tablet: 1 tab(s) orally 2 times a day (29 Apr 2025 19:47)  cyanocobalamin 1000 mcg oral tablet: 1 tab(s) orally once a day (29 Apr 2025 19:47)  donepezil 10 mg oral tablet: 2 tab(s) orally once a day (in the morning) (29 Apr 2025 19:51)  levocetirizine 5 mg oral tablet: 1 tab(s) orally once a day (in the evening) (29 Apr 2025 19:47)  levothyroxine 75 mcg (0.075 mg) oral tablet: 1 tab(s) orally once a day (29 Apr 2025 19:47)  losartan 25 mg oral tablet: 1 tab(s) orally once a day (in the morning) (29 Apr 2025 19:51)  metFORMIN 500 mg oral tablet: 1 tab(s) orally once a day with dinner (29 Apr 2025 19:51)  montelukast 10 mg oral tablet: 1 tab(s) orally once a day (in the evening) (29 Apr 2025 19:47)  Multiple Vitamins oral tablet: 1 tab(s) orally once a day (29 Apr 2025 19:47)  Omega-3 1000 mg oral capsule: 1 cap(s) orally once a day (in the morning) (29 Apr 2025 19:51)  Probiotic Formula (Bacillus Coagulans) oral capsule: 1 cap(s) orally once a day (29 Apr 2025 19:51)  venlafaxine 150 mg oral tablet, extended release: 1 tab(s) orally once a day (29 Apr 2025 19:51)   Spoke to daughter at bedside and referenced outpatient pharmacy fill records to obtain medication list. Last dose of Eliquis 5 mG BID 4/29/25 PM. Outpatient Medication Review updated.    HOME MEDICATIONS:  amLODIPine 10 mg oral tablet: 1 tab(s) orally once a day (29 Apr 2025 19:47)  apixaban 5 mg oral tablet: 1 tab(s) orally every 12 hours (29 Apr 2025 19:47)  ascorbic acid 500 mg oral capsule: 1 cap(s) orally once a day (29 Apr 2025 19:51)  atorvastatin 20 mg oral tablet: 1 tab(s) orally once a day (29 Apr 2025 19:47)  Bystolic 5 mg oral tablet: 1 tab(s) orally once a day in the morning (29 Apr 2025 19:47)  Calcium 600+D 600 mg-200 units oral tablet: 1 tab(s) orally 2 times a day (29 Apr 2025 19:47)  cyanocobalamin 1000 mcg oral tablet: 1 tab(s) orally once a day (29 Apr 2025 19:47)  donepezil 10 mg oral tablet: 2 tab(s) orally once a day (in the morning) (29 Apr 2025 19:51)  levocetirizine 5 mg oral tablet: 1 tab(s) orally once a day (in the evening) (29 Apr 2025 19:47)  levothyroxine 75 mcg (0.075 mg) oral tablet: 1 tab(s) orally once a day (29 Apr 2025 19:47)  losartan 25 mg oral tablet: 1 tab(s) orally once a day (in the morning) (29 Apr 2025 19:51)  metFORMIN 500 mg oral tablet: 1 tab(s) orally once a day with dinner (29 Apr 2025 19:51)  montelukast 10 mg oral tablet: 1 tab(s) orally once a day (in the evening) (29 Apr 2025 19:47)  Multiple Vitamins oral tablet: 1 tab(s) orally once a day (29 Apr 2025 19:47)  Omega-3 1000 mg oral capsule: 1 cap(s) orally once a day (in the morning) (29 Apr 2025 19:51)  Probiotic Formula (Bacillus Coagulans) oral capsule: 1 cap(s) orally once a day (29 Apr 2025 19:51)  venlafaxine 150 mg oral tablet, extended release: 1 tab(s) orally once a day (29 Apr 2025 19:51)

## 2025-04-29 NOTE — ED ADULT NURSE REASSESSMENT NOTE - NS ED NURSE REASSESS COMMENT FT1
Assumed care of pt from MANNY Boateng RN at 2310.Pt is resting comfortably in stretcher. NAD. Pt is A&Ox4. Respirations are even and unlabored. Pt sinus jessica on cardiac monitor. Pt awaiting MRI. Pt neurologically intact, NIH-0. Plan on going.

## 2025-04-29 NOTE — ED ADULT NURSE NOTE - NSFALLRISKINTERV_ED_ALL_ED

## 2025-04-29 NOTE — ED CDU PROVIDER INITIAL DAY NOTE - CLINICAL SUMMARY MEDICAL DECISION MAKING FREE TEXT BOX
83 yo female PMHx cerebral aneurysm s/p clipping and coiling in the 90s, HTN, HLD, hypothyroidism, CVA, vertigo, A-fib, CAD w/stent presented to ED c/o acute onset of dizziness with nausea, vomiting and diaphoresis. Code stroke upon arrival - imaging negative. Patient now at baseline. Patient placed in OBS for MRI. Of note, reports coiling is compatible with MRI and has had MRIs previously (HIE with MRI head from 8/2022).

## 2025-04-29 NOTE — ED CDU PROVIDER INITIAL DAY NOTE - NSICDXPASTMEDICALHX_GEN_ALL_CORE_FT
PAST MEDICAL HISTORY:  C. difficile colitis     CAD (coronary artery disease)     Cerebral aneurysm s/p clipping and coiling    Diverticulitis     Hyperlipidemia     Hypertension     Hypothyroidism     Seasonal allergies     Stroke     Vertigo

## 2025-04-29 NOTE — ED PROVIDER NOTE - ATTENDING CONTRIBUTION TO CARE
Kady ZAMBRANO, DO, have personally provided 45 minutes of critical care time exclusive of time spent on separately billable procedures. Time includes review of laboratory data, radiology results, discussion with consultants, and monitoring for potential decompensation. Interventions were performed as documented above.     IKady, performed the initial face to face bedside interview with this patient regarding history of present illness, review of symptoms and relevant past medical, social and family history.  I completed an independent physical examination.  I was the initial provider who evaluated this patient. I have signed out the follow up of any pending tests (i.e. labs, radiological studies) to the resident.  I have communicated the patient’s plan of care and disposition with the resident.

## 2025-04-29 NOTE — ED PROVIDER NOTE - CLINICAL SUMMARY MEDICAL DECISION MAKING FREE TEXT BOX
83 y/o F with PMH cerebral aneurysm s/p clipping and coiling presents for dizziness with nausea and diaphoresis that started 1 hour PTA, no similar symptoms in the past, NIH 0, however Code Stroke called due to acute onset of symptoms without obvious cause (not moving at the time). Patient diaphoretic and ill appearing.

## 2025-04-29 NOTE — ED PROVIDER NOTE - INPATIENT RESIDENT/ACP NOTIFIED
Pharmacy requests refill of Losartan/HCTZ  Last filled 10/31/19  Last OV 10/31/19  Refill authorized per protocol.      
Stephanie

## 2025-04-29 NOTE — ED PROVIDER NOTE - OBJECTIVE STATEMENT
84-year-old female with past medical history cerebral aneurysm status post clipping and coiling, hypertension, hyperlipidemia, hypothyroidism, stroke, vertigo presents for acute onset of dizziness with nausea, vomiting and diaphoresis that started around 1615 while sitting on the couch with her daughter.  She denies similar symptoms in the past, denies that similar symptoms have happened in the past and still feels very dizzy just lying on the stretcher. Nausea improved. She denies associated HA, chest pain, SOB. Code Stroke called by me.

## 2025-04-29 NOTE — ED ADULT TRIAGE NOTE - CHIEF COMPLAINT QUOTE
A&O x 4 BIBA for N/V starting 1 hour ago. EKG ordered. A&O x 4 BIBA for N/V starting 1 hour ago. EKG ordered. Dr. Balderrama assessing pt.

## 2025-04-29 NOTE — ED CDU PROVIDER INITIAL DAY NOTE - OBJECTIVE STATEMENT
83 yo female PMHx cerebral aneurysm s/p clipping and coiling in the 90s, HTN, HLD, hypothyroidism, CVA, vertigo, A-fib presented to ED c/o acute onset of dizziness with nausea, vomiting and diaphoresis that started around 1615 while sitting on the couch with her daughter.  She denies similar symptoms in the past, denies that similar symptoms have happened in the past and is currently asymptomatic. Denies associated HA, chest pain, SOB.

## 2025-04-29 NOTE — ED PROVIDER NOTE - PROGRESS NOTE DETAILS
Kady Cavanaugh, DO: Patient reassessed, feels much better. Daughter, Anjali, at bedside states that patient's coils were placed at [facility] in the mid-to-late 90's. She follows with Dr. Kofi Oleary of neurology - daughter will get the cards/record number for the coils. Everyone is agreeable to stay for observation for MRI. Patient recently had an MRI for Dr. Oleary about 6 months ago.

## 2025-04-29 NOTE — ED CDU PROVIDER INITIAL DAY NOTE - ATTENDING APP SHARED VISIT CONTRIBUTION OF CARE
84-year-old female history as above, placed in observation for acute onset of dizziness nausea vomiting diaphoresis.  CT results appreciated.  Currently asymptomatic.  Patient placed in observation for MRI

## 2025-04-29 NOTE — ED CDU PROVIDER INITIAL DAY NOTE - PHYSICAL EXAMINATION
Const: Awake, alert and oriented.   Skin: Warm and dry. Pale.   HEENT: NC/AT. Moist mucous membranes.  Eyes: No scleral icterus. EOMI.  Neck:. Soft and supple. Full ROM without pain.  Cardiac: Regular rate and regular rhythm. +S1/S2. No murmurs. Peripheral pulses 2+ and symmetric. No LE edema.  Resp: Speaking in full sentences. No evidence of respiratory distress. No wheezes, rales or rhonchi.  Abd: Soft, non-tender, non-distended. Normal bowel sounds in all 4 quadrants. No guarding or rebound.  Back: Spine midline and non-tender. No CVAT.  Skin: No rashes, abrasions or lacerations.  Neuro: CN II-XII grossly in tact. Symmetrical smile. PERRL. EOMI. Bilateral and symmetric sensation of face. Tongue midline. Normal finger to nose. Normal rapid alternating movements. No pronator drift. Sensation symmetrically intact bilateral upper and lower extremities.

## 2025-04-29 NOTE — ED PROVIDER NOTE - NS ED ROS FT
Const: Denies fever, chills  HEENT: Denies blurry vision, sore throat  Neck: Denies neck pain/stiffness  Resp: Denies coughing, SOB  Cardiovascular: Denies CP, palpitations, LE edema  GI: + Nausea, + vomiting.  Denies abdominal pain, diarrhea, constipation, blood in stool  : Denies urinary frequency/urgency/dysuria, hematuria  MSK: Denies back pain  Neuro: + Dizziness. Denies HA, numbness, weakness  Skin: Denies rashes.

## 2025-04-30 VITALS
DIASTOLIC BLOOD PRESSURE: 63 MMHG | RESPIRATION RATE: 18 BRPM | HEART RATE: 70 BPM | OXYGEN SATURATION: 98 % | SYSTOLIC BLOOD PRESSURE: 145 MMHG | TEMPERATURE: 98 F

## 2025-04-30 LAB
TROPONIN T, HIGH SENSITIVITY RESULT: 10 NG/L — SIGNIFICANT CHANGE UP (ref 0–51)
TSH SERPL-MCNC: 1.65 UIU/ML — SIGNIFICANT CHANGE UP (ref 0.27–4.2)

## 2025-04-30 PROCEDURE — 84484 ASSAY OF TROPONIN QUANT: CPT

## 2025-04-30 PROCEDURE — 70450 CT HEAD/BRAIN W/O DYE: CPT | Mod: MC

## 2025-04-30 PROCEDURE — 85730 THROMBOPLASTIN TIME PARTIAL: CPT

## 2025-04-30 PROCEDURE — 0042T: CPT | Mod: MC

## 2025-04-30 PROCEDURE — 80053 COMPREHEN METABOLIC PANEL: CPT

## 2025-04-30 PROCEDURE — 99291 CRITICAL CARE FIRST HOUR: CPT | Mod: 25

## 2025-04-30 PROCEDURE — G0378: CPT

## 2025-04-30 PROCEDURE — 85610 PROTHROMBIN TIME: CPT

## 2025-04-30 PROCEDURE — 99239 HOSP IP/OBS DSCHRG MGMT >30: CPT

## 2025-04-30 PROCEDURE — 81003 URINALYSIS AUTO W/O SCOPE: CPT

## 2025-04-30 PROCEDURE — 70498 CT ANGIOGRAPHY NECK: CPT | Mod: MC

## 2025-04-30 PROCEDURE — 96375 TX/PRO/DX INJ NEW DRUG ADDON: CPT | Mod: XU

## 2025-04-30 PROCEDURE — 84443 ASSAY THYROID STIM HORMONE: CPT

## 2025-04-30 PROCEDURE — 85025 COMPLETE CBC W/AUTO DIFF WBC: CPT

## 2025-04-30 PROCEDURE — 96374 THER/PROPH/DIAG INJ IV PUSH: CPT | Mod: XU

## 2025-04-30 PROCEDURE — 70551 MRI BRAIN STEM W/O DYE: CPT | Mod: 26

## 2025-04-30 PROCEDURE — 71045 X-RAY EXAM CHEST 1 VIEW: CPT

## 2025-04-30 PROCEDURE — 93005 ELECTROCARDIOGRAM TRACING: CPT

## 2025-04-30 PROCEDURE — 70551 MRI BRAIN STEM W/O DYE: CPT | Mod: MC

## 2025-04-30 PROCEDURE — 36415 COLL VENOUS BLD VENIPUNCTURE: CPT

## 2025-04-30 PROCEDURE — 82962 GLUCOSE BLOOD TEST: CPT

## 2025-04-30 PROCEDURE — 70496 CT ANGIOGRAPHY HEAD: CPT | Mod: MC

## 2025-04-30 RX ADMIN — ATORVASTATIN CALCIUM 10 MILLIGRAM(S): 80 TABLET, FILM COATED ORAL at 00:08

## 2025-04-30 RX ADMIN — DONEPEZIL HYDROCHLORIDE 20 MILLIGRAM(S): 5 TABLET ORAL at 13:32

## 2025-04-30 RX ADMIN — MONTELUKAST SODIUM 10 MILLIGRAM(S): 10 TABLET ORAL at 13:31

## 2025-04-30 RX ADMIN — METFORMIN HYDROCHLORIDE 500 MILLIGRAM(S): 850 TABLET ORAL at 00:09

## 2025-04-30 RX ADMIN — Medication 75 MICROGRAM(S): at 05:32

## 2025-04-30 RX ADMIN — AMLODIPINE BESYLATE 10 MILLIGRAM(S): 10 TABLET ORAL at 05:32

## 2025-04-30 RX ADMIN — LOSARTAN POTASSIUM 25 MILLIGRAM(S): 100 TABLET, FILM COATED ORAL at 05:32

## 2025-04-30 RX ADMIN — APIXABAN 5 MILLIGRAM(S): 2.5 TABLET, FILM COATED ORAL at 05:32

## 2025-04-30 RX ADMIN — VENLAFAXINE HYDROCHLORIDE 150 MILLIGRAM(S): 37.5 CAPSULE, EXTENDED RELEASE ORAL at 13:31

## 2025-04-30 NOTE — ED ADULT NURSE REASSESSMENT NOTE - NS ED NURSE REASSESS COMMENT FT1
Pt is resting in stretcher comfortably at this time. NAD. Pt is A&Ox4. Respirations even and unlabored. Pt sinus jsesica on cardiac monitor. Pt awaiting MRI results. Plan of care on going.

## 2025-04-30 NOTE — ED CDU PROVIDER DISPOSITION NOTE - CLINICAL COURSE
5 yo female history of Cerebral Aneurysms s/p Coiling/Clipping, Migraine, CAD, HTN, HLD, Prediabetes, CVA, vertigo, and Hypothyroidism presented to the ED  for acute onset of dizziness with nausea vomiting and diaphoresis. code stroke on arrival, was then placed in observation for MR of which was completed and showing" 1. Cerebral hemispheric cortical and subcortical multifocal encephalomalacia appears similar to 2022 2. 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 Ischemic white matter disease typical for age5. Diffuse brain volume loss overall not atypical for age, noting differential lateral ventricular dilatation reflecting differential   cerebral hemispheric volume loss". pt reporting symptomatic improvement. ambulatory in ed. advised on results and fu outpt with established pcp, neuro and cardiology 83 yo female history of Cerebral Aneurysms s/p Coiling/Clipping, Migraine, CAD, HTN, HLD, Prediabetes, CVA, vertigo, and Hypothyroidism presented to the ED  for acute onset of dizziness with nausea vomiting and diaphoresis. code stroke on arrival, was then placed in observation for MR of which was completed and showing" 1. Cerebral hemispheric cortical and subcortical multifocal encephalomalacia appears similar to 2022 2. 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 Ischemic white matter disease typical for age3. Diffuse brain volume loss overall not atypical for age, noting differential lateral ventricular dilatation reflecting differential   cerebral hemispheric volume loss". pt reporting symptomatic improvement. ambulatory in ed. advised on results and fu outpt with established pcp, neuro and cardiology

## 2025-04-30 NOTE — ED CDU PROVIDER SUBSEQUENT DAY NOTE - PROGRESS NOTE DETAILS
85 yo female history of Cerebral Aneurysms s/p Coiling/Clipping, Migraine, CAD, HTN, HLD, Prediabetes, CVA, vertigo, and Hypothyroidism presented to the ED  for acute onset of dizziness with nausea vomiting and diaphoresis. code stroke on arrival, was then placed in observation for MR of which was completed and showing" 1. Cerebral hemispheric cortical and subcortical multifocal encephalomalacia appears similar to 2022 2. 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 Ischemic white matter disease typical for age5. Diffuse brain volume loss overall not atypical for age, noting differential lateral ventricular dilatation reflecting differential   cerebral hemispheric volume loss". pt reporting symptomatic improvement. will plan for ambulation after breakfast, if remains symptomatically improved and able to ambulate will plan for discharge with further outpt fu

## 2025-04-30 NOTE — ED CDU PROVIDER SUBSEQUENT DAY NOTE - ATTENDING APP SHARED VISIT CONTRIBUTION OF CARE
No events. No pertinent interval history. Vital signs remained stable overnight. awaiting MRI results

## 2025-04-30 NOTE — ED CDU PROVIDER DISPOSITION NOTE - ATTENDING CONTRIBUTION TO CARE
"""Follow dry ARMD without treatment. MVI/AREDS/Amsler. Patient to call if vision changes or distortion increases. Good diet/do not smoke. """ 85 yo female history of Cerebral Aneurysms s/p Coiling/Clipping, Migraine, CAD, HTN, HLD, Prediabetes, CVA, vertigo, and Hypothyroidism presented to the ED  for acute onset of dizziness with nausea vomiting and diaphoresis. code stroke on arrival, was then placed in observation for MR of which was completed and showing" 1. Cerebral hemispheric cortical and subcortical multifocal encephalomalacia appears similar to 2022 2. 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 Ischemic white matter disease typical for age5. Diffuse brain volume loss overall not atypical for age, noting differential lateral ventricular dilatation reflecting differential cerebral hemispheric volume loss". pt reporting symptomatic improvement. ambulatory in ed. advised on results and fu outpt with established pcp, neuro and cardiology

## 2025-04-30 NOTE — ED CDU PROVIDER DISPOSITION NOTE - NSFOLLOWUPINSTRUCTIONS_ED_ALL_ED_FT
please follow with:   - primary medical clinician ASAP  - outpatient cardiologist   - outpatient neurologist  please continue all daily medications   seek re-evaluation for new or worsening symptoms     Dizziness    Dizziness can manifest as a feeling of unsteadiness or light-headedness. You may feel like you are about to faint. This condition can be caused by a number of things, including medicines, dehydration, or illness. Drink enough fluid to keep your urine clear or pale yellow. Do not drink alcohol and limit your caffeine intake. Avoid quick or sudden movements.  Rise slowly from chairs and steady yourself until you feel okay. In the morning, first sit up on the side of the bed.    SEEK IMMEDIATE MEDICAL CARE IF YOU HAVE ANY OF THE FOLLOWING SYMPTOMS: vomiting, changes in your vision or speech, weakness in your arms or legs, trouble speaking or swallowing, chest pain, abdominal pain, shortness of breath, sweating, bleeding, headache, neck pain, or fever.

## 2025-04-30 NOTE — ED CDU PROVIDER SUBSEQUENT DAY NOTE - CLINICAL SUMMARY MEDICAL DECISION MAKING FREE TEXT BOX
83 yo female PMHx cerebral aneurysm s/p clipping and coiling in the 90s, HTN, HLD, hypothyroidism, CVA, vertigo, A-fib, CAD w/stent presented to ED c/o acute onset of dizziness with nausea, vomiting and diaphoresis. Code stroke upon arrival - imaging negative. Patient now at baseline. Patient placed in OBS for MRI (pending results).

## 2025-04-30 NOTE — ED CDU PROVIDER DISPOSITION NOTE - PATIENT PORTAL LINK FT
You can access the FollowMyHealth Patient Portal offered by [facility] by registering at the following website: [email or web address]. By joining [employer]’s FollowMyHealth portal, you will also be able to view your health information using other applications (apps) compatible with our system.

## 2025-04-30 NOTE — ED ADULT NURSE REASSESSMENT NOTE - NS ED NURSE REASSESS COMMENT FT1
pt received in bed restring, alert and oriented x4, awaiting MRI results. VSS, will continue to monitor.

## 2025-07-28 PROBLEM — I25.10 ATHEROSCLEROTIC HEART DISEASE OF NATIVE CORONARY ARTERY WITHOUT ANGINA PECTORIS: Chronic | Status: ACTIVE | Noted: 2025-04-29

## 2025-08-04 ENCOUNTER — APPOINTMENT (OUTPATIENT)
Dept: CARDIOLOGY | Facility: CLINIC | Age: 85
End: 2025-08-04

## 2025-08-06 ENCOUNTER — APPOINTMENT (OUTPATIENT)
Dept: CARDIOLOGY | Facility: CLINIC | Age: 85
End: 2025-08-06
Payer: MEDICARE

## 2025-08-06 VITALS
RESPIRATION RATE: 14 BRPM | SYSTOLIC BLOOD PRESSURE: 122 MMHG | WEIGHT: 152 LBS | BODY MASS INDEX: 23.86 KG/M2 | HEIGHT: 67 IN | DIASTOLIC BLOOD PRESSURE: 70 MMHG | OXYGEN SATURATION: 96 % | HEART RATE: 65 BPM

## 2025-08-06 DIAGNOSIS — I65.29 OCCLUSION AND STENOSIS OF UNSPECIFIED CAROTID ARTERY: ICD-10-CM

## 2025-08-06 DIAGNOSIS — F32.A ANXIETY DISORDER, UNSPECIFIED: ICD-10-CM

## 2025-08-06 DIAGNOSIS — I48.0 PAROXYSMAL ATRIAL FIBRILLATION: ICD-10-CM

## 2025-08-06 DIAGNOSIS — R06.09 OTHER FORMS OF DYSPNEA: ICD-10-CM

## 2025-08-06 DIAGNOSIS — I67.1 CEREBRAL ANEURYSM, NONRUPTURED: ICD-10-CM

## 2025-08-06 DIAGNOSIS — E78.00 PURE HYPERCHOLESTEROLEMIA, UNSPECIFIED: ICD-10-CM

## 2025-08-06 DIAGNOSIS — I10 ESSENTIAL (PRIMARY) HYPERTENSION: ICD-10-CM

## 2025-08-06 DIAGNOSIS — F41.9 ANXIETY DISORDER, UNSPECIFIED: ICD-10-CM

## 2025-08-06 PROCEDURE — 99204 OFFICE O/P NEW MOD 45 MIN: CPT

## 2025-08-06 PROCEDURE — 93000 ELECTROCARDIOGRAM COMPLETE: CPT

## 2025-09-08 ENCOUNTER — APPOINTMENT (OUTPATIENT)
Dept: CARDIOLOGY | Facility: CLINIC | Age: 85
End: 2025-09-08
Payer: MEDICARE

## 2025-09-08 PROCEDURE — 93880 EXTRACRANIAL BILAT STUDY: CPT

## 2025-09-08 PROCEDURE — 93306 TTE W/DOPPLER COMPLETE: CPT
